# Patient Record
Sex: FEMALE | Race: BLACK OR AFRICAN AMERICAN | NOT HISPANIC OR LATINO | Employment: OTHER | URBAN - METROPOLITAN AREA
[De-identification: names, ages, dates, MRNs, and addresses within clinical notes are randomized per-mention and may not be internally consistent; named-entity substitution may affect disease eponyms.]

---

## 2017-10-06 ENCOUNTER — TRANSFERRED RECORDS (OUTPATIENT)
Dept: HEALTH INFORMATION MANAGEMENT | Facility: CLINIC | Age: 52
End: 2017-10-06

## 2017-10-25 ENCOUNTER — TRANSFERRED RECORDS (OUTPATIENT)
Dept: HEALTH INFORMATION MANAGEMENT | Facility: CLINIC | Age: 52
End: 2017-10-25

## 2017-11-14 ENCOUNTER — TRANSFERRED RECORDS (OUTPATIENT)
Dept: HEALTH INFORMATION MANAGEMENT | Facility: CLINIC | Age: 52
End: 2017-11-14

## 2018-08-09 ENCOUNTER — TRANSFERRED RECORDS (OUTPATIENT)
Dept: HEALTH INFORMATION MANAGEMENT | Facility: CLINIC | Age: 53
End: 2018-08-09

## 2018-08-15 ENCOUNTER — HEALTH MAINTENANCE LETTER (OUTPATIENT)
Age: 53
End: 2018-08-15

## 2018-11-06 ENCOUNTER — PRE VISIT (OUTPATIENT)
Dept: ORTHOPEDICS | Facility: CLINIC | Age: 53
End: 2018-11-06

## 2018-11-06 NOTE — TELEPHONE ENCOUNTER
RECORDS RECEIVED FROM: [facility]   DATE RECEIVED: In process   NOTES STATUS DETAILS   OFFICE NOTE from referring provider yes    OFFICE NOTE from other specialist yes    DISCHARGE SUMMARY from hospital none    DISCHARGE REPORT from the ER none    OPERATIVE REPORT none    MEDICATION LIST yes    IMPLANT RECORD/STICKER N/A    LABS     CBC/DIFF yes    CULTURES N/A    INJECTIONS DONE IN RADIOLOGY yes    MRI yes    CT SCAN none    XRAYS (IMAGES & REPORTS) In process    TUMOR     PATHOLOGY  Slides & report N/A

## 2018-11-12 DIAGNOSIS — M54.41 BILATERAL LOW BACK PAIN WITH RIGHT-SIDED SCIATICA: Primary | ICD-10-CM

## 2018-11-14 ENCOUNTER — RADIANT APPOINTMENT (OUTPATIENT)
Dept: GENERAL RADIOLOGY | Facility: CLINIC | Age: 53
End: 2018-11-14
Attending: PHYSICIAN ASSISTANT
Payer: COMMERCIAL

## 2018-11-14 ENCOUNTER — RADIANT APPOINTMENT (OUTPATIENT)
Dept: GENERAL RADIOLOGY | Facility: CLINIC | Age: 53
End: 2018-11-14
Attending: ORTHOPAEDIC SURGERY
Payer: COMMERCIAL

## 2018-11-14 ENCOUNTER — OFFICE VISIT (OUTPATIENT)
Dept: ORTHOPEDICS | Facility: CLINIC | Age: 53
End: 2018-11-14
Payer: COMMERCIAL

## 2018-11-14 VITALS — HEIGHT: 66 IN | WEIGHT: 160 LBS | BODY MASS INDEX: 25.71 KG/M2

## 2018-11-14 DIAGNOSIS — M53.3 SACROILIAC JOINT PAIN: ICD-10-CM

## 2018-11-14 DIAGNOSIS — M53.3 SACROILIAC JOINT PAIN: Primary | ICD-10-CM

## 2018-11-14 DIAGNOSIS — M54.41 BILATERAL LOW BACK PAIN WITH RIGHT-SIDED SCIATICA: ICD-10-CM

## 2018-11-14 ASSESSMENT — ENCOUNTER SYMPTOMS
DISTURBANCES IN COORDINATION: 1
SPEECH CHANGE: 0
SEIZURES: 0
WEAKNESS: 1
NUMBNESS: 0
TINGLING: 1
MEMORY LOSS: 1
PARALYSIS: 0
DIZZINESS: 0
TREMORS: 0
LOSS OF CONSCIOUSNESS: 0
HEADACHES: 0

## 2018-11-14 NOTE — LETTER
2018       RE: Marichuy Navarrete  [address]  [address]  [place] [address]     Dear Colleague,    Thank you for referring your patient, Marichuy Navarrete, to the [facility] at [facility]. Please see a copy of my visit note below.    [facility]  Orthopedics  Jason Matos MD  2018     Name: Marichuy Navarrete  MRN: [id number]  Age: 53 year old  : 1965  Referring provider: Yareli Carter     Chief Complaint: SI joint pain     History of Present Illness:   Marichuy Navarrete is a 53 year old female who presents today for evaluation of SI joint pain. The patient was previously evaluated at [facility]. On 07/10/2018, she reported that a prior left sided SI joint injection provided significant relief for about 5 months but her symptoms then flared. Her pain was worse with walking, standing, and crossing her legs. Her baseline leg weakness, attributed to MS, was not progressing. She had found chiropractic care and acupuncture beneficial. The patient was referred to physical therapy and to our clinic for further evaluation. Of note, the patient also had prior left sided L4-5 transforaminal epidural steroid injection with 80 percent improvement in left buttock pain.     Today, she describes a several years long history of symptoms.  It originally started in  when she noticed an episodic left foot drop as well as left sided buttock warmth.  She states that it felt like she was sitting on a heating pad.  These symptoms came and went for about 2-3 years but then started to worsen.  She notes an unsteady gate and left leg weakness.  She was diagnosed with multiple sclerosis in  by Dr. Baer.  She is currently taking ibuprofen and Tylenol.  She is using ice and heat but feels that ice is much more effective.    She denies diabetes, heart disease, history of stroke, cancer, blood clots, kidney disease and liver disease.  She is not  "taking any blood thinners.    MATHEW: 64%  Ihptzq84: 19  Pain scale: 10     Review of Systems:   A 10-point review of systems was obtained and is negative except for as noted in the HPI.     Medications:   No current outpatient prescriptions on file.    Allergies:  The patient reports no known allergies.    Past Medical History:  Multiple sclerosis    Past Surgical History:  Ulnar nerve release, 19 years ago  Hysterectomy, 17 years ago    Social History:  Patient lives with two sons in an apartment.  On social security. She denies tobacco.     Family History:  No past pertinent family history.     Physical Examination:  Height 1.676 m (5' 6\"), weight 72.6 kg (160 lb).   Constitutional - Patient is healthy, well-nourished and appears stated age.    BMI = 25.82    Respiratory - Patient is breathing normally and in no respiratory distress.   Skin - No suspicious rashes or lesions.   Psychiatric - Normal mood and affect.   Cardiovascular - Extremities warm and well perfused.   Eyes - Visual acuity is normal to the written word.   ENT - Hearing intact to the spoken word.   GI - No abdominal distention.   Musculoskeletal - Antalgic gait without use of assistive devices.            Lumbar Spine:    Appearance - Normal    Palpation - Non-tender to palpation in the midline    ROM - Deferred    Motor -        LOWER EXTREMITY Left Right   Hip flexion 4+/5 5/5   Knee flexion 5/5 5/5   Knee extension 5/5 5/5   Ankle dorsiflexion 5/5 5/5   Ankle plantarflexion 5/5 5/5   Great toe extension 4+/5 5/5     SI provocation tests:    Right Left   Mejia Finger Test  - +   EDY  - +   Thigh Thrust: - -   Pelvic Compression Test  - +   Palpation  - +   Pelvic Gapping  - +   Gaenslen s Test  - +   Sacral Thrust (SI) - +        Neurologic - Sensation intact to light touch bilaterally in the lower extremities.      REFLEXES Left Right   Patella 2+ 2+   Ankle jerk 2+ 2+       Imaging:   Radiographs of the pelvis - 3 views (11/14/2018)  They " show a dysmorphic/upsloped sacrum.    I have independently reviewed the above imaging studies; the results were discussed with the patient.       Assessment:   53 year old female with left sacroiliac joint pain.    Plan:   This patient meets the SAGE and ISASS criteria for minimally invasive SI fusion. These include 4/5 physical exam manuevers being positive, a positive response to injection, failure of extensive non-surgical management, imaging that rules out other possible pain generators (MRI of lumbar spine showing the sacrum, and plain films showing preserved joint space).  We will proceed with a second injection and I linus recommend that this be CT guided. She knows to aggravate her pain prior to and following the injection.     We discussed minimally invasive sacroiliac joint fusion in detail. It is performed using titanium triangular rods. About 70-80% of patients who have an SI fusion experience roughly 50% improvement in symptoms.  About 30% of Dr. Matos's patients have gone on to need the contralateral side fused as well.  We discussed the procedure in detail, and we reviewed the relevant anatomy using a model of the pelvis.  Weight bearing on the surgical side will be limited for three weeks after surgery, and she will need crutches during this time.  Physical therapy will be initiated after three weeks, and full recovery typically takes 6-12 weeks.      We reviewed the risks and benefits of the surgery in detail. The risks include those associated with anesthesia, including death, pulmonary embolism, DVT, stroke, myocardial infarction, pneumonia, blindness, and urinary tract infection. Additional risks include the risk of blood loss, infection, nerve damage, failure to heal, hardware problems and failure of the intervention to improve her symptoms.  With regard to blood loss, we use a medication called tranexamic acid.  Blood loss is typically around 25 cc during an SI fusion, and we have not had to  transfuse anyone during or after this particular procedure.  To mitigate the risk of infection, we use IV antibiotics.  Deep infections after this surgery are rare.  To avoid hardware problems and nerve damage, we use an intra-operative CT, which is like GPS for the spine.  She understands the risks of the surgery and wishes to proceed. The patient has a dysmorphic sacrum, so we would obtain a CT scan preoperatively to determine the exact shape of the sacrum.  After the CT scan and CT-guided injection, she should contact our office to place the surgical order.     45 minutes were spent with the patient, >50% of which was spent in discussion regarding the diagnosis, treatment options, and answering questions.    Era Chiang PA-C ........................ 11/14/2018, 11:05 AM    Scribe Disclosure:   IJing, am serving as a scribe to document services personally performed by Jason Matos MD at this visit, based upon the provider's statements to me. All documentation has been reviewed by the aforementioned provider prior to being entered into the official medical record.    I saw and evaluated the patient and developed the plan.      Answers for HPI/ROS submitted by the patient on 11/14/2018   General Symptoms: No  Skin Symptoms: No  HENT Symptoms: No  EYE SYMPTOMS: No  HEART SYMPTOMS: No  LUNG SYMPTOMS: No  INTESTINAL SYMPTOMS: No  URINARY SYMPTOMS: No  GYNECOLOGIC SYMPTOMS: No  BREAST SYMPTOMS: No  SKELETAL SYMPTOMS: No  BLOOD SYMPTOMS: No  NERVOUS SYSTEM SYMPTOMS: Yes  MENTAL HEALTH SYMPTOMS: No  Trouble with coordination: Yes  Dizziness or trouble with balance: No  Fainting or black-out spells: No  Memory loss: Yes  Headache: No  Seizures: No  Speech problems: No  Tingling: Yes  Tremor: No  Weakness: Yes  Difficulty walking: Yes  Paralysis: No  Numbness: No  PHQ-2 Score: 2      Again, thank you for allowing me to participate in the care of your patient.      Sincerely,    Jason Matos,  MD

## 2018-11-14 NOTE — NURSING NOTE
"Reason For Visit:   Chief Complaint   Patient presents with     Spine - Consult For     Consult     SI jt pain with sciatica       Primary MD: Zamzam Oconnell  Ref. MD: Yareli Roblero    ?  No  Currently working? No.  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  Date of surgery: N/A  Type of surgery: N/A.  Smoker: No      Ht 1.676 m (5' 6\")  Wt 72.6 kg (160 lb)  BMI 25.82 kg/m2    Pain Assessment  Patient Currently in Pain: Yes  0-10 Pain Scale: 10  Primary Pain Location: Sacrum  Other Pain Locations: buttock and leg   Pain Descriptors: Burning, Dull, Aching, Constant  Alleviating Factors: Ice  Aggravating Factors: Sitting, Standing, Walking, Lying, Other (comment) (any position for long periods)    Oswestry (MATHEW) Questionnaire    OSWESTRY DISABILITY INDEX 11/14/2018   Count 10   Sum 32   Oswestry Score (%) 64            Neck Disability Index (NDI) Questionnaire    No flowsheet data found.                Promis 10 Assessment    PROMIS 10 11/14/2018   In general, would you say your health is: Good   In general, would you say your quality of life is: Fair   In general, how would you rate your physical health? Good   In general, how would you rate your mental health, including your mood and your ability to think? Good   In general, how would you rate your satisfaction with your social activities and relationships? Poor   In general, please rate how well you carry out your usual social activities and roles Fair   To what extent are you able to carry out your everyday physical activities such as walking, climbing stairs, carrying groceries, or moving a chair? A little   How often have you been bothered by emotional problems such as feeling anxious, depressed or irritable? Sometimes   How would you rate your fatigue on average? Moderate   How would you rate your pain on average?   0 = No Pain  to  10 = Worst Imaginable Pain 9   In general, would you " say your health is: 3   In general, would you say your quality of life is: 2   In general, how would you rate your physical health? 3   In general, how would you rate your mental health, including your mood and your ability to think? 3   In general, how would you rate your satisfaction with your social activities and relationships? 1   In general, please rate how well you carry out your usual social activities and roles. (This includes activities at home, at work and in your community, and responsibilities as a parent, child, spouse, employee, friend, etc.) 2   To what extent are you able to carry out your everyday physical activities such as walking, climbing stairs, carrying groceries, or moving a chair? 2   In the past 7 days, how often have you been bothered by emotional problems such as feeling anxious, depressed, or irritable? 3   In the past 7 days, how would you rate your fatigue on average? 3   In the past 7 days, how would you rate your pain on average, where 0 means no pain, and 10 means worst imaginable pain? 9   Global Mental Health Score 9   Global Physical Health Score 10   PROMIS TOTAL - SUBSCORES 19                Ladi Banuelos, ATC

## 2018-11-14 NOTE — PROGRESS NOTES
[facility]  Orthopedics  Jason Matos MD  2018     Name: Marichuy Navarrete  MRN: [id number]  Age: 53 year old  : 1965  Referring provider: Yareli Carter     Chief Complaint: SI joint pain     History of Present Illness:   Marichuy Navarrete is a 53 year old female who presents today for evaluation of SI joint pain. The patient was previously evaluated at [facility] spine center. On 07/10/2018, she reported that a prior left sided SI joint injection provided significant relief for about 5 months but her symptoms then flared. Her pain was worse with walking, standing, and crossing her legs. Her baseline leg weakness, attributed to MS, was not progressing. She had found chiropractic care and acupuncture beneficial. The patient was referred to physical therapy and to our clinic for further evaluation. Of note, the patient also had prior left sided L4-5 transforaminal epidural steroid injection with 80 percent improvement in left buttock pain.     Today, she describes a several years long history of symptoms.  It originally started in  when she noticed an episodic left foot drop as well as left sided buttock warmth.  She states that it felt like she was sitting on a heating pad.  These symptoms came and went for about 2-3 years but then started to worsen.  She notes an unsteady gate and left leg weakness.  She was diagnosed with multiple sclerosis in  by Dr. Baer.  She is currently taking ibuprofen and Tylenol.  She is using ice and heat but feels that ice is much more effective.    She denies diabetes, heart disease, history of stroke, cancer, blood clots, kidney disease and liver disease.  She is not taking any blood thinners.    MATHEW: 64%  Cvuskn61: 19  Pain scale: 10     Review of Systems:   A 10-point review of systems was obtained and is negative except for as noted in the HPI.     Medications:   No current outpatient prescriptions on file.    Allergies:  The patient reports no known  "allergies.    Past Medical History:  Multiple sclerosis    Past Surgical History:  Ulnar nerve release, 19 years ago  Hysterectomy, 17 years ago    Social History:  Patient lives with two sons in an apartment.  On social security. She denies tobacco.     Family History:  No past pertinent family history.     Physical Examination:  Height 1.676 m (5' 6\"), weight 72.6 kg (160 lb).   Constitutional - Patient is healthy, well-nourished and appears stated age.    BMI = 25.82    Respiratory - Patient is breathing normally and in no respiratory distress.   Skin - No suspicious rashes or lesions.   Psychiatric - Normal mood and affect.   Cardiovascular - Extremities warm and well perfused.   Eyes - Visual acuity is normal to the written word.   ENT - Hearing intact to the spoken word.   GI - No abdominal distention.   Musculoskeletal - Antalgic gait without use of assistive devices.            Lumbar Spine:    Appearance - Normal    Palpation - Non-tender to palpation in the midline    ROM - Deferred    Motor -        LOWER EXTREMITY Left Right   Hip flexion 4+/5 5/5   Knee flexion 5/5 5/5   Knee extension 5/5 5/5   Ankle dorsiflexion 5/5 5/5   Ankle plantarflexion 5/5 5/5   Great toe extension 4+/5 5/5     SI provocation tests:    Right Left   Mejia Finger Test  - +   EDY  - +   Thigh Thrust: - -   Pelvic Compression Test  - +   Palpation  - +   Pelvic Gapping  - +   Gaenslen s Test  - +   Sacral Thrust (SI) - +        Neurologic - Sensation intact to light touch bilaterally in the lower extremities.      REFLEXES Left Right   Patella 2+ 2+   Ankle jerk 2+ 2+       Imaging:   Radiographs of the pelvis - 3 views (11/14/2018)  They show a dysmorphic/upsloped sacrum.    I have independently reviewed the above imaging studies; the results were discussed with the patient.       Assessment:   53 year old female with left sacroiliac joint pain.    Plan:   This patient meets the SAGE and ISASS criteria for minimally invasive SI " fusion. These include 4/5 physical exam manuevers being positive, a positive response to injection, failure of extensive non-surgical management, imaging that rules out other possible pain generators (MRI of lumbar spine showing the sacrum, and plain films showing preserved joint space).  We will proceed with a second injection and YANI barriga recommend that this be CT guided. She knows to aggravate her pain prior to and following the injection.     We discussed minimally invasive sacroiliac joint fusion in detail. It is performed using titanium triangular rods. About 70-80% of patients who have an SI fusion experience roughly 50% improvement in symptoms.  About 30% of Dr. Matos's patients have gone on to need the contralateral side fused as well.  We discussed the procedure in detail, and we reviewed the relevant anatomy using a model of the pelvis.  Weight bearing on the surgical side will be limited for three weeks after surgery, and she will need crutches during this time.  Physical therapy will be initiated after three weeks, and full recovery typically takes 6-12 weeks.      We reviewed the risks and benefits of the surgery in detail. The risks include those associated with anesthesia, including death, pulmonary embolism, DVT, stroke, myocardial infarction, pneumonia, blindness, and urinary tract infection. Additional risks include the risk of blood loss, infection, nerve damage, failure to heal, hardware problems and failure of the intervention to improve her symptoms.  With regard to blood loss, we use a medication called tranexamic acid.  Blood loss is typically around 25 cc during an SI fusion, and we have not had to transfuse anyone during or after this particular procedure.  To mitigate the risk of infection, we use IV antibiotics.  Deep infections after this surgery are rare.  To avoid hardware problems and nerve damage, we use an intra-operative CT, which is like GPS for the spine.  She understands the  risks of the surgery and wishes to proceed. The patient has a dysmorphic sacrum, so we would obtain a CT scan preoperatively to determine the exact shape of the sacrum.  After the CT scan and CT-guided injection, she should contact our office to place the surgical order.     45 minutes were spent with the patient, >50% of which was spent in discussion regarding the diagnosis, treatment options, and answering questions.    Era Chiang PA-C ........................ 11/14/2018, 11:05 AM    Scribe Disclosure:   I, Jing Gil, am serving as a scribe to document services personally performed by Jason Matos MD at this visit, based upon the provider's statements to me. All documentation has been reviewed by the aforementioned provider prior to being entered into the official medical record.    I saw and evaluated the patient and developed the plan.      Answers for HPI/ROS submitted by the patient on 11/14/2018   General Symptoms: No  Skin Symptoms: No  HENT Symptoms: No  EYE SYMPTOMS: No  HEART SYMPTOMS: No  LUNG SYMPTOMS: No  INTESTINAL SYMPTOMS: No  URINARY SYMPTOMS: No  GYNECOLOGIC SYMPTOMS: No  BREAST SYMPTOMS: No  SKELETAL SYMPTOMS: No  BLOOD SYMPTOMS: No  NERVOUS SYSTEM SYMPTOMS: Yes  MENTAL HEALTH SYMPTOMS: No  Trouble with coordination: Yes  Dizziness or trouble with balance: No  Fainting or black-out spells: No  Memory loss: Yes  Headache: No  Seizures: No  Speech problems: No  Tingling: Yes  Tremor: No  Weakness: Yes  Difficulty walking: Yes  Paralysis: No  Numbness: No  PHQ-2 Score: 2

## 2018-11-14 NOTE — MR AVS SNAPSHOT
"              After Visit Summary   2018    Marichuy Navarrete    MRN: [id number]           Patient Information     Date Of Birth          1965        Visit Information        Provider Department      2018 10:00 AM [facility]        Today's Diagnoses     Sacroiliac joint pain    -  1       Follow-ups after your visit        Future tests that were ordered for you today     Open Future Orders        Priority Expected Expires Ordered    CT Pelvis Bone wo Contrast Routine  2019    CT Sacroiliac Diagnostic Joint Injection Routine  2019            Who to contact     Please call your clinic at [phone] to:    Ask questions about your health    Make or cancel appointments    Discuss your medicines    Learn about your test results    Speak to your doctor            Additional Information About Your Visit        MyChart Information     [employer] is an electronic gateway that provides easy, online access to your medical records. With [employer], you can request a clinic appointment, read your test results, renew a prescription or communicate with your care team.     To sign up for [employer] visit the website at [email or web address]   You will be asked to enter the access code listed below, as well as some personal information. Please follow the directions to create your username and password.     Your access code is: [email or web address]  Expires: 2019  5:31 AM     Your access code will  in 90 days. If you need help or a new code, please contact your [facility] or call [phone] for assistance.        Care EveryWhere ID     This is your Care EveryWhere ID. This could be used by other organizations to access your [facility] medical records  [id number]        Your Vitals Were     Height BMI (Body Mass Index)                1.676 m (5' 6\") 25.82 kg/m2           Blood Pressure from Last 3 Encounters:   No " data found for BP    Weight from Last 3 Encounters:   11/14/18 72.6 kg (160 lb)               Primary Care Provider Office Phone # Fax #    Zmazam Oconnell [phone]       [facility] [address]  [facility] [address]        Equal Access to Services     JAYCEELEE KENZIE : Hadii aad ku hadasho Soomaali, waaxda luqadaha, qaybta kaalmada adeegyada, waxay idiin hayaan adeeg khararamiro la'aan ah. So [facility] [phone].    ATENCIÓN: Si habla español, tiene a terry disposición servicios gratuitos de asistencia lingüística. Llame al [phone].    We comply with applicable federal civil rights laws and [place] laws. We do not discriminate on the basis of race, color, national origin, age, disability, sex, sexual orientation, or gender identity.            Thank you!     Thank you for choosing [facility]  for your care. Our goal is always to provide you with excellent care. Hearing back from our patients is one way we can continue to improve our services. Please take a few minutes to complete the written survey that you may receive in the mail after your visit with us. Thank you!             Your Updated Medication List - Protect others around you: Learn how to safely use, store and throw away your medicines at www.disposemymeds.org.      Notice  As of 11/14/2018 12:17 PM    You have not been prescribed any medications.

## 2018-11-21 ENCOUNTER — TRANSFERRED RECORDS (OUTPATIENT)
Dept: HEALTH INFORMATION MANAGEMENT | Facility: CLINIC | Age: 53
End: 2018-11-21

## 2018-11-21 ENCOUNTER — TELEPHONE (OUTPATIENT)
Dept: ORTHOPEDICS | Facility: CLINIC | Age: 53
End: 2018-11-21

## 2018-11-21 NOTE — TELEPHONE ENCOUNTER
[facility] Call Center    Phone Message    May a detailed message be left on voicemail: no    Reason for Call: Other: Patient is calling to report that she had a SI joint injection at [facility] 11/21/2018. Patient is reporting a pain level of a 3 currenty and before the procedure her pain level was a 10. Patient would like to let Dr. Matos know.      Action Taken: Message routed to:  [facility]      See phone messages.  Pt reports relief of Left back/buttock pain from rated 10 before to rated 3 after CT guided Left SacroIliac Joint injection-see report which also states pain relief.  With this injection she meets the SAGE & ISASS criteria for Minimally Invasive Left SacroIliac Joint Fusion surgery.  See dictation of clinic visit 11-14-18 for documentation of physical exam manuevers.   reviewed CT scan which he states documents  Her Dysmorphic Sacrum so her Anatomy is documented.  He also reviewed injection result & placed surgery order for above.    I left pt. Message of above &  that Prior Auth. Dept  [phone]  will work on Insurance approval next.    Call back prn with questions. SANTINO./Marge Covington RN.

## 2018-11-28 ENCOUNTER — TELEPHONE (OUTPATIENT)
Dept: ORTHOPEDICS | Facility: CLINIC | Age: 53
End: 2018-11-28

## 2018-11-28 NOTE — TELEPHONE ENCOUNTER
[facility] Call Center    Phone Message    May a detailed message be left on voicemail: yes    Reason for Call: Other: Patient called to schedule surgery as she is in a great deal of pain.       Action Taken: Message routed to:  [facility]o    See 2 phone messages.  Pt reports relief of Left back/buttock pain from rated 10 before to rated 3 after CT guided Left SacroIliac Joint injection-see report which also states pain relief.  With this injection she meets the SAGE & ISASS criteria for Minimally Invasive Left SacroIliac Joint Fusion surgery discussed at appt.   See dictation of clinic visit 11-14-18 for documentation of physical exam manuevers.   reviewed CT scan which he states documents  Her Dysmorphic Sacrum so her Anatomy is documented.  He also reviewed injection result & placed surgery order for above.    I left pt. Message of above &  that Prior Auth. Dept  [phone]  will work on Insurance approval next.    Call back prn with questions. SANTINO./Marge Covington RN.

## 2018-11-30 ENCOUNTER — RADIANT APPOINTMENT (OUTPATIENT)
Dept: CT IMAGING | Facility: CLINIC | Age: 53
End: 2018-11-30
Attending: PHYSICIAN ASSISTANT
Payer: COMMERCIAL

## 2018-11-30 DIAGNOSIS — M53.3 SACROILIAC JOINT PAIN: ICD-10-CM

## 2019-01-08 LAB — MAMMOGRAM: NORMAL

## 2019-02-25 ENCOUNTER — TELEPHONE (OUTPATIENT)
Dept: ORTHOPEDICS | Facility: CLINIC | Age: 54
End: 2019-02-25

## 2019-02-25 NOTE — TELEPHONE ENCOUNTER
Patient is scheduled for surgery with Dr. Matos    Spoke or left message with: Patient    Date of Surgery: 3/18/19    Location: [place]    Post op: 6 weeks    Pre-op with surgeon (if applicable): Complete    H&P: Patient will call to schedule with PCP    Additional imaging/appointments: N/A    Surgery packet: Received in clinic     Additional comments: SI bone notified

## 2019-03-11 DIAGNOSIS — M46.1 SACROILIITIS (H): Primary | ICD-10-CM

## 2019-03-12 ENCOUNTER — DOCUMENTATION ONLY (OUTPATIENT)
Dept: CARE COORDINATION | Facility: CLINIC | Age: 54
End: 2019-03-12

## 2019-03-12 ENCOUNTER — TELEPHONE (OUTPATIENT)
Dept: ORTHOPEDICS | Facility: CLINIC | Age: 54
End: 2019-03-12

## 2019-03-12 ENCOUNTER — THERAPY VISIT (OUTPATIENT)
Dept: PHYSICAL THERAPY | Facility: CLINIC | Age: 54
End: 2019-03-12
Payer: COMMERCIAL

## 2019-03-12 DIAGNOSIS — M53.3 SI (SACROILIAC) JOINT DYSFUNCTION: ICD-10-CM

## 2019-03-12 DIAGNOSIS — M46.1 SACROILIITIS (H): ICD-10-CM

## 2019-03-12 PROCEDURE — 97161 PT EVAL LOW COMPLEX 20 MIN: CPT | Mod: GP | Performed by: PHYSICAL THERAPIST

## 2019-03-12 PROCEDURE — 97530 THERAPEUTIC ACTIVITIES: CPT | Mod: GP | Performed by: PHYSICAL THERAPIST

## 2019-03-12 PROCEDURE — 97110 THERAPEUTIC EXERCISES: CPT | Mod: GP | Performed by: PHYSICAL THERAPIST

## 2019-03-12 NOTE — TELEPHONE ENCOUNTER
[facility] Call Center    Phone Message    May a detailed message be left on voicemail: yes    Reason for Call: Other: pt calling to say that if you want the preop form that you need to call Dr Zamzam Oconnell's office to request it at [phone]. Per the pt     Action Taken: Message routed to:  [facility] Orthopedics

## 2019-03-12 NOTE — PROGRESS NOTES
Physical Therapy Initial Evaluation  March 12, 2019     MD Instructions/Precautions/Restrictions: Pre-op visit (1 time) - instruct in post-op protocol.     Therapist Impression:   Marichuy Navarrete presents with findings consistent with SI dysfunction, with related impairments limiting her ability to sit, sleep, stand, walk, navigate stairs, bend, lift. Skilled PT services are necessary in order to reduce impairments and improve independent function.     Subjective:   Date of Surgery: 3/18/19  C/C: LBP/L SI pain down entire L leg. Hx of L foot drop many years ago, also diagnosed with MS in 2014.   Worsened by: sit, sleep, stand, walk, navigate stairs, bend, lift.    Alleviated by: L SIJ injection with 5 months relief, also of note she has in the past had L4-5 transforaminal JOEL with 80% improvement in L buttock pain.    General health as reported by patient: fair  Pertinent medical/surgical history: Refer to health history in EMR. Imaging: refer to EMR. Current occupational status: SSI. Patient's goals are: decrease pain, ambulate with greater ease.    Objective:  Area(s) of Chief Complaint: low back left, SIJ left, glute left and leg left.     Lumbar Screen:  Active ROM Limitation   Flexion Mod loss, mild incr LBP   Extension Mod/max loss, incr LBP    L R   Rotation Min, NE Min, NE   Sidebend Min, NE Min, NE     Effect of Repeated Lumbar Movements: symptoms same.   Lumbopelvic rhythm: Decreased lower lumbar mobility, hypermobile contribution from upper lumbar/TLJ.   Beaulieu/quadrant test: (-) bilaterally.     Deferred Gladis series of SIJ provocation tests due to recently having been tested by MD.    Hip Screen:  (*Indicates patient s pain)   PROM L PROM R   Hip Flx 120 120   Hip IR 90/90 45 45   Hip ER 90/90 50 50     Effect of Low Trunk Manipulation: not assessed.     PT Goals:  1.) By end of visit, patient will be able to maintain WB precaution of 20lbs. using walker and/or crutches in order to promote independent  home and community mobility.     2.) By end of visit, patient will be independent in use of home exercise program for gradual improvements in painfree functional mobility and improving strength in order to promote independent living and general health/wellness.     Assessment/Plan:    The patient is a 54 year old female with chief complaint of LBP/buttock pain.    The patient has the following significant findings with corresponding treatment plan.  Diagnosis 1:  SI dysfunction    Pain -  hot/cold therapy, manual therapy, splint/taping/bracing/orthotics and self management  Decreased ROM/flexibility - manual therapy, therapeutic exercise and home program  Decreased joint mobility - manual therapy, therapeutic exercise and home program  Decreased strength - therapeutic exercise, therapeutic activities and home program  Impaired balance - neuro re-education, therapeutic activities and home program  Decreased proprioception - neuro re-education and therapeutic activities  Impaired gait - gait training and assistive devices  Impaired muscle performance - neuro re-education and home program  Decreased function - therapeutic activities and home program  Impaired posture - neuro re-education, therapeutic activities and home program  Instability -  Therapeutic Activity, Therapeutic Exercise, Neuromuscular Re-education, Splinting/Taping/Bracing/Orthotic, home program    Therapy Evaluation Codes:   1) History comprised of:   Personal factors that impact the plan of care:      Please refer to health history in EMR.    Comorbidity factors that impact the plan of care are:      Please refer to health history in EMR.     Medications impacting care: None.  2) Examination of Body Systems comprised of:   Body structures and functions that impact the plan of care:      Sacral illiac joint.   Activity limitations that impact the plan of care are:      Bathing, Bending, Cooking, Dressing, Lifting, Sitting, Squatting/kneeling, Stairs,  Standing, Walking, Sleeping and Laying down.   Clinical presentation characteristics are:    Stable/Uncomplicated.  3) Presentation comprised of:   Presentation scored as Low complexity with uncomplicated characteristics..  4) Decision-Making    Low complexity using standardized patient assessment instrument and/or measureable assessment of functional outcome.  Cumulative Therapy Evaluation is: Low complexity.    Previous and current functional limitations:  (See Goal Flow Sheet for this information)    Short term and Long term goals: (See Goal Flow Sheet for this information)     Communication ability:  Patient appears to be able to clearly communicate and understand verbal and written communication and follow directions correctly.  Treatment Explanation - The following has been discussed with the patient: RX ordered/plan of care, anticipated outcomes, and possible risks and side effects.  This patient would benefit from PT intervention to resume normal activities.   Rehab potential is good.    Frequency:  1 X week, once daily  Duration:  for 1 weeks (pre-op only, will re-evaluate patient post-operatively)  Discharge Plan: Achieve all LTGs, be independent in home treatment program, and reach maximal therapeutic benefit.    Please refer to the daily flowsheet for treatment today, total treatment time and time spent performing 1:1 timed codes.

## 2019-03-12 NOTE — TELEPHONE ENCOUNTER
Returned call to receive preop form completed 3/5/19 at [facility] per pt. Requested preop form to be sent to fax [phone] with attention to Marge JUDD RN. Form was to be sent out today.     Jason GATES ATC

## 2019-03-13 NOTE — PROGRESS NOTES
[facility] Initial Evaluation  Subjective:  The history is provided by the patient. No  was used.   Marichuy Navarrete is a 54 year old female with a sacroiliac condition.                                   Pertinent medical history includes:  High blood pressure and multiple sclerosis.  Medical allergies: no.  Other surgeries include:  Other (Hysterectomy; ulnar nerve surgery left elbow).  Current medications:  Muscle relaxants.                                      Objective:  System    Physical Exam    General     ROS    Assessment/Plan:

## 2019-03-14 RX ORDER — HYDROCODONE BITARTRATE AND ACETAMINOPHEN 5; 325 MG/1; MG/1
1 TABLET ORAL EVERY 4 HOURS PRN
Status: ON HOLD | COMMUNITY
End: 2019-03-18

## 2019-03-14 RX ORDER — ACETAMINOPHEN 500 MG
500-1000 TABLET ORAL EVERY 6 HOURS PRN
Status: ON HOLD | COMMUNITY
End: 2019-03-18

## 2019-03-18 ENCOUNTER — ANESTHESIA EVENT (OUTPATIENT)
Dept: SURGERY | Facility: CLINIC | Age: 54
End: 2019-03-18
Payer: COMMERCIAL

## 2019-03-18 ENCOUNTER — APPOINTMENT (OUTPATIENT)
Dept: GENERAL RADIOLOGY | Facility: CLINIC | Age: 54
End: 2019-03-18
Attending: ORTHOPAEDIC SURGERY
Payer: COMMERCIAL

## 2019-03-18 ENCOUNTER — ANESTHESIA (OUTPATIENT)
Dept: SURGERY | Facility: CLINIC | Age: 54
End: 2019-03-18
Payer: COMMERCIAL

## 2019-03-18 ENCOUNTER — HOSPITAL ENCOUNTER (OUTPATIENT)
Facility: CLINIC | Age: 54
Discharge: HOME | End: 2019-03-18
Attending: ORTHOPAEDIC SURGERY | Admitting: ORTHOPAEDIC SURGERY
Payer: COMMERCIAL

## 2019-03-18 VITALS
HEART RATE: 72 BPM | SYSTOLIC BLOOD PRESSURE: 123 MMHG | TEMPERATURE: 97.5 F | DIASTOLIC BLOOD PRESSURE: 83 MMHG | OXYGEN SATURATION: 99 % | BODY MASS INDEX: 26.58 KG/M2 | RESPIRATION RATE: 14 BRPM | WEIGHT: 164.68 LBS

## 2019-03-18 DIAGNOSIS — M53.3 SI (SACROILIAC) JOINT DYSFUNCTION: Primary | ICD-10-CM

## 2019-03-18 LAB — GLUCOSE BLDC GLUCOMTR-MCNC: 123 MG/DL (ref 70–99)

## 2019-03-18 PROCEDURE — 25000132 ZZH RX MED GY IP 250 OP 250 PS 637: Performed by: ANESTHESIOLOGY

## 2019-03-18 PROCEDURE — 25000566 ZZH SEVOFLURANE, EA 15 MIN: Performed by: ORTHOPAEDIC SURGERY

## 2019-03-18 PROCEDURE — 25000128 H RX IP 250 OP 636: Performed by: NURSE ANESTHETIST, CERTIFIED REGISTERED

## 2019-03-18 PROCEDURE — 71000015 ZZH RECOVERY PHASE 1 LEVEL 2 EA ADDTL HR: Performed by: ORTHOPAEDIC SURGERY

## 2019-03-18 PROCEDURE — 25800030 ZZH RX IP 258 OP 636: Performed by: NURSE ANESTHETIST, CERTIFIED REGISTERED

## 2019-03-18 PROCEDURE — 25000301 ZZH OR RX SURGIFLO W/THROMBIN KIT 2ML 1991 OPNP: Performed by: ORTHOPAEDIC SURGERY

## 2019-03-18 PROCEDURE — 25000128 H RX IP 250 OP 636: Performed by: PHYSICIAN ASSISTANT

## 2019-03-18 PROCEDURE — 37000009 ZZH ANESTHESIA TECHNICAL FEE, EACH ADDTL 15 MIN: Performed by: ORTHOPAEDIC SURGERY

## 2019-03-18 PROCEDURE — 27210794 ZZH OR GENERAL SUPPLY STERILE: Performed by: ORTHOPAEDIC SURGERY

## 2019-03-18 PROCEDURE — 71000027 ZZH RECOVERY PHASE 2 EACH 15 MINS: Performed by: ORTHOPAEDIC SURGERY

## 2019-03-18 PROCEDURE — 71000014 ZZH RECOVERY PHASE 1 LEVEL 2 FIRST HR: Performed by: ORTHOPAEDIC SURGERY

## 2019-03-18 PROCEDURE — 37000008 ZZH ANESTHESIA TECHNICAL FEE, 1ST 30 MIN: Performed by: ORTHOPAEDIC SURGERY

## 2019-03-18 PROCEDURE — 40000278 XR SURGERY CARM FLUORO LESS THAN 5 MIN: Mod: TC

## 2019-03-18 PROCEDURE — 82962 GLUCOSE BLOOD TEST: CPT

## 2019-03-18 PROCEDURE — C9290 INJ, BUPIVACAINE LIPOSOME: HCPCS | Performed by: PHYSICIAN ASSISTANT

## 2019-03-18 PROCEDURE — 25000128 H RX IP 250 OP 636: Performed by: ORTHOPAEDIC SURGERY

## 2019-03-18 PROCEDURE — 36000078 ZZH SURGERY LEVEL 6 W FLUORO 1ST 30 MIN - UMMC: Performed by: ORTHOPAEDIC SURGERY

## 2019-03-18 PROCEDURE — C1776 JOINT DEVICE (IMPLANTABLE): HCPCS | Performed by: ORTHOPAEDIC SURGERY

## 2019-03-18 PROCEDURE — 25000125 ZZHC RX 250: Performed by: NURSE ANESTHETIST, CERTIFIED REGISTERED

## 2019-03-18 PROCEDURE — 25000128 H RX IP 250 OP 636: Performed by: ANESTHESIOLOGY

## 2019-03-18 PROCEDURE — 25000125 ZZHC RX 250: Performed by: ORTHOPAEDIC SURGERY

## 2019-03-18 PROCEDURE — 36000076 ZZH SURGERY LEVEL 6 EA 15 ADDTL MIN - UMMC: Performed by: ORTHOPAEDIC SURGERY

## 2019-03-18 PROCEDURE — 40000171 ZZH STATISTIC PRE-PROCEDURE ASSESSMENT III: Performed by: ORTHOPAEDIC SURGERY

## 2019-03-18 DEVICE — IMPLANTABLE DEVICE: Type: IMPLANTABLE DEVICE | Site: SACRUM | Status: FUNCTIONAL

## 2019-03-18 RX ORDER — ACETAMINOPHEN 325 MG/1
975 TABLET ORAL ONCE
Status: COMPLETED | OUTPATIENT
Start: 2019-03-18 | End: 2019-03-18

## 2019-03-18 RX ORDER — SODIUM CHLORIDE, SODIUM LACTATE, POTASSIUM CHLORIDE, CALCIUM CHLORIDE 600; 310; 30; 20 MG/100ML; MG/100ML; MG/100ML; MG/100ML
INJECTION, SOLUTION INTRAVENOUS CONTINUOUS PRN
Status: DISCONTINUED | OUTPATIENT
Start: 2019-03-18 | End: 2019-03-18

## 2019-03-18 RX ORDER — SODIUM CHLORIDE, SODIUM LACTATE, POTASSIUM CHLORIDE, CALCIUM CHLORIDE 600; 310; 30; 20 MG/100ML; MG/100ML; MG/100ML; MG/100ML
INJECTION, SOLUTION INTRAVENOUS CONTINUOUS
Status: DISCONTINUED | OUTPATIENT
Start: 2019-03-18 | End: 2019-03-18 | Stop reason: HOSPADM

## 2019-03-18 RX ORDER — MEPERIDINE HYDROCHLORIDE 25 MG/ML
12.5 INJECTION INTRAMUSCULAR; INTRAVENOUS; SUBCUTANEOUS
Status: DISCONTINUED | OUTPATIENT
Start: 2019-03-18 | End: 2019-03-18 | Stop reason: HOSPADM

## 2019-03-18 RX ORDER — EPHEDRINE SULFATE 50 MG/ML
INJECTION, SOLUTION INTRAMUSCULAR; INTRAVENOUS; SUBCUTANEOUS PRN
Status: DISCONTINUED | OUTPATIENT
Start: 2019-03-18 | End: 2019-03-18

## 2019-03-18 RX ORDER — LIDOCAINE 40 MG/G
CREAM TOPICAL
Status: DISCONTINUED | OUTPATIENT
Start: 2019-03-18 | End: 2019-03-18 | Stop reason: HOSPADM

## 2019-03-18 RX ORDER — NALOXONE HYDROCHLORIDE 0.4 MG/ML
.1-.4 INJECTION, SOLUTION INTRAMUSCULAR; INTRAVENOUS; SUBCUTANEOUS
Status: DISCONTINUED | OUTPATIENT
Start: 2019-03-18 | End: 2019-03-18 | Stop reason: HOSPADM

## 2019-03-18 RX ORDER — ONDANSETRON 4 MG/1
4 TABLET, ORALLY DISINTEGRATING ORAL EVERY 30 MIN PRN
Status: DISCONTINUED | OUTPATIENT
Start: 2019-03-18 | End: 2019-03-18 | Stop reason: HOSPADM

## 2019-03-18 RX ORDER — CEFAZOLIN SODIUM 1 G/3ML
1 INJECTION, POWDER, FOR SOLUTION INTRAMUSCULAR; INTRAVENOUS SEE ADMIN INSTRUCTIONS
Status: DISCONTINUED | OUTPATIENT
Start: 2019-03-18 | End: 2019-03-18 | Stop reason: HOSPADM

## 2019-03-18 RX ORDER — ACETAMINOPHEN 325 MG/1
325-650 TABLET ORAL EVERY 6 HOURS PRN
Qty: 100 TABLET | Refills: 0 | Status: SHIPPED | OUTPATIENT
Start: 2019-03-18

## 2019-03-18 RX ORDER — FENTANYL CITRATE 50 UG/ML
INJECTION, SOLUTION INTRAMUSCULAR; INTRAVENOUS PRN
Status: DISCONTINUED | OUTPATIENT
Start: 2019-03-18 | End: 2019-03-18

## 2019-03-18 RX ORDER — OXYCODONE HYDROCHLORIDE 5 MG/1
5-10 TABLET ORAL EVERY 4 HOURS PRN
Qty: 30 TABLET | Refills: 0 | Status: SHIPPED | OUTPATIENT
Start: 2019-03-18 | End: 2019-03-25

## 2019-03-18 RX ORDER — BUPIVACAINE HYDROCHLORIDE AND EPINEPHRINE 2.5; 5 MG/ML; UG/ML
INJECTION, SOLUTION INFILTRATION; PERINEURAL PRN
Status: DISCONTINUED | OUTPATIENT
Start: 2019-03-18 | End: 2019-03-18 | Stop reason: HOSPADM

## 2019-03-18 RX ORDER — CEFAZOLIN SODIUM 2 G/100ML
2 INJECTION, SOLUTION INTRAVENOUS
Status: COMPLETED | OUTPATIENT
Start: 2019-03-18 | End: 2019-03-18

## 2019-03-18 RX ORDER — GABAPENTIN 300 MG/1
300 CAPSULE ORAL ONCE
Status: COMPLETED | OUTPATIENT
Start: 2019-03-18 | End: 2019-03-18

## 2019-03-18 RX ORDER — LIDOCAINE HYDROCHLORIDE 20 MG/ML
INJECTION, SOLUTION INFILTRATION; PERINEURAL PRN
Status: DISCONTINUED | OUTPATIENT
Start: 2019-03-18 | End: 2019-03-18

## 2019-03-18 RX ORDER — ONDANSETRON 2 MG/ML
4 INJECTION INTRAMUSCULAR; INTRAVENOUS EVERY 30 MIN PRN
Status: DISCONTINUED | OUTPATIENT
Start: 2019-03-18 | End: 2019-03-18 | Stop reason: HOSPADM

## 2019-03-18 RX ORDER — PROPOFOL 10 MG/ML
INJECTION, EMULSION INTRAVENOUS CONTINUOUS PRN
Status: DISCONTINUED | OUTPATIENT
Start: 2019-03-18 | End: 2019-03-18

## 2019-03-18 RX ORDER — SENNA AND DOCUSATE SODIUM 50; 8.6 MG/1; MG/1
1 TABLET, FILM COATED ORAL 2 TIMES DAILY PRN
Qty: 30 TABLET | Refills: 0 | Status: SHIPPED | OUTPATIENT
Start: 2019-03-18

## 2019-03-18 RX ORDER — HYDROMORPHONE HYDROCHLORIDE 1 MG/ML
.3-.5 INJECTION, SOLUTION INTRAMUSCULAR; INTRAVENOUS; SUBCUTANEOUS EVERY 10 MIN PRN
Status: DISCONTINUED | OUTPATIENT
Start: 2019-03-18 | End: 2019-03-18 | Stop reason: HOSPADM

## 2019-03-18 RX ORDER — FENTANYL CITRATE 50 UG/ML
25-50 INJECTION, SOLUTION INTRAMUSCULAR; INTRAVENOUS
Status: DISCONTINUED | OUTPATIENT
Start: 2019-03-18 | End: 2019-03-18 | Stop reason: HOSPADM

## 2019-03-18 RX ORDER — PROPOFOL 10 MG/ML
INJECTION, EMULSION INTRAVENOUS PRN
Status: DISCONTINUED | OUTPATIENT
Start: 2019-03-18 | End: 2019-03-18

## 2019-03-18 RX ORDER — OXYCODONE HYDROCHLORIDE 5 MG/1
5-10 TABLET ORAL EVERY 4 HOURS PRN
Status: DISCONTINUED | OUTPATIENT
Start: 2019-03-18 | End: 2019-03-18 | Stop reason: HOSPADM

## 2019-03-18 RX ORDER — ONDANSETRON 2 MG/ML
INJECTION INTRAMUSCULAR; INTRAVENOUS PRN
Status: DISCONTINUED | OUTPATIENT
Start: 2019-03-18 | End: 2019-03-18

## 2019-03-18 RX ADMIN — PHENYLEPHRINE HYDROCHLORIDE 0.2 MCG/KG/MIN: 10 INJECTION, SOLUTION INTRAMUSCULAR; INTRAVENOUS; SUBCUTANEOUS at 08:18

## 2019-03-18 RX ADMIN — PROPOFOL 25 MCG/KG/MIN: 10 INJECTION, EMULSION INTRAVENOUS at 08:15

## 2019-03-18 RX ADMIN — OXYCODONE HYDROCHLORIDE 5 MG: 5 TABLET ORAL at 10:27

## 2019-03-18 RX ADMIN — PROPOFOL 140 MG: 10 INJECTION, EMULSION INTRAVENOUS at 07:48

## 2019-03-18 RX ADMIN — SUGAMMADEX 200 MG: 100 INJECTION, SOLUTION INTRAVENOUS at 09:08

## 2019-03-18 RX ADMIN — ROCURONIUM BROMIDE 40 MG: 10 INJECTION INTRAVENOUS at 07:49

## 2019-03-18 RX ADMIN — CEFAZOLIN SODIUM 2 G: 2 INJECTION, SOLUTION INTRAVENOUS at 08:00

## 2019-03-18 RX ADMIN — PROPOFOL 60 MG: 10 INJECTION, EMULSION INTRAVENOUS at 07:49

## 2019-03-18 RX ADMIN — PHENYLEPHRINE HYDROCHLORIDE 50 MCG: 10 INJECTION, SOLUTION INTRAMUSCULAR; INTRAVENOUS; SUBCUTANEOUS at 08:06

## 2019-03-18 RX ADMIN — LIDOCAINE HYDROCHLORIDE 100 MG: 20 INJECTION, SOLUTION INFILTRATION; PERINEURAL at 07:48

## 2019-03-18 RX ADMIN — FENTANYL CITRATE 50 MCG: 50 INJECTION, SOLUTION INTRAMUSCULAR; INTRAVENOUS at 08:26

## 2019-03-18 RX ADMIN — OXYCODONE HYDROCHLORIDE 5 MG: 5 TABLET ORAL at 11:21

## 2019-03-18 RX ADMIN — Medication 5 MG: at 08:12

## 2019-03-18 RX ADMIN — ACETAMINOPHEN 975 MG: 325 TABLET, FILM COATED ORAL at 07:18

## 2019-03-18 RX ADMIN — ONDANSETRON 4 MG: 2 INJECTION INTRAMUSCULAR; INTRAVENOUS at 09:08

## 2019-03-18 RX ADMIN — PHENYLEPHRINE HYDROCHLORIDE 50 MCG: 10 INJECTION, SOLUTION INTRAMUSCULAR; INTRAVENOUS; SUBCUTANEOUS at 08:12

## 2019-03-18 RX ADMIN — FENTANYL CITRATE 75 MCG: 50 INJECTION, SOLUTION INTRAMUSCULAR; INTRAVENOUS at 07:48

## 2019-03-18 RX ADMIN — PHENYLEPHRINE HYDROCHLORIDE 50 MCG: 10 INJECTION, SOLUTION INTRAMUSCULAR; INTRAVENOUS; SUBCUTANEOUS at 08:45

## 2019-03-18 RX ADMIN — SODIUM CHLORIDE, POTASSIUM CHLORIDE, SODIUM LACTATE AND CALCIUM CHLORIDE: 600; 310; 30; 20 INJECTION, SOLUTION INTRAVENOUS at 07:40

## 2019-03-18 RX ADMIN — MIDAZOLAM 2 MG: 1 INJECTION INTRAMUSCULAR; INTRAVENOUS at 07:40

## 2019-03-18 RX ADMIN — GABAPENTIN 300 MG: 300 CAPSULE ORAL at 07:18

## 2019-03-18 RX ADMIN — Medication 0.3 MG: at 09:52

## 2019-03-18 NOTE — OR NURSING
Attempted to get up again pt stated felt better ,But became adiaphoric and dizzy again up in recliner  Now, family at bedside continue to monitor  VSS.  Will allow to rest again and attempt later to get up again

## 2019-03-18 NOTE — ANESTHESIA PREPROCEDURE EVALUATION
Anesthesia Pre-Procedure Evaluation    Patient: Marichuy Navarrete   MRN:     [id number] Gender:   female   Age:    54 year old :      1965        Preoperative Diagnosis: Left Sacroiliac Joint Pain   Procedure(s):  STEALTH ASSISTED MINIMALLY INVASIVE SACRAL ILIAC JOINT FUSION LEFT     Past Medical History:   Diagnosis Date     Abnormal gait      Cervical radiculopathy      Hypertension      Lumbar radiculopathy      Multiple sclerosis (H)     LLE weakness     Osteopenia      Other chronic pain     SI joint pain, left sciatica     Pure hypercholesterolemia      Renal disease       History reviewed. No pertinent surgical history.       Anesthesia Evaluation     .             ROS/MED HX    ENT/Pulmonary:       Neurologic:     (+)Multiple Sclerosis     Cardiovascular:     (+) Dyslipidemia, hypertension----. : . . . :. .       METS/Exercise Tolerance:     Hematologic:         Musculoskeletal:         GI/Hepatic:         Renal/Genitourinary:         Endo:         Psychiatric:         Infectious Disease:         Malignancy:         Other:    (+) H/O Chronic Pain,                       PHYSICAL EXAM:   Mental Status/Neuro: A/A/O   Airway: Facies: Feasible  Mallampati: II  Mouth/Opening: Full  TM distance: > 6 cm  Neck ROM: Full   Respiratory: Auscultation: CTAB     Resp. Rate: Normal     Resp. Effort: Normal      CV: Rhythm: Regular  Rate: Age appropriate  Heart: Normal Sounds   Comments:      Dental: Normal                  No results found for: WBC, HGB, HCT, PLT, CRP, SED, NA, POTASSIUM, CHLORIDE, CO2, BUN, CR, GLC, VENTURA, PHOS, MAG, ALBUMIN, PROTTOTAL, ALT, AST, GGT, ALKPHOS, BILITOTAL, BILIDIRECT, LIPASE, AMYLASE, BLAZE, PTT, INR, FIBR, TSH, T4, T3, HCG, HCGS, CKTOTAL, CKMB, TROPN    Preop Vitals  BP Readings from Last 3 Encounters:   19 (!) 163/99    Pulse Readings from Last 3 Encounters:   No data found for Pulse      Resp Readings from Last 3 Encounters:   19 20    SpO2 Readings from Last 3  "Encounters:   03/18/19 100%      Temp Readings from Last 1 Encounters:   03/18/19 36.4  C (97.5  F) (Oral)    Ht Readings from Last 1 Encounters:   11/14/18 1.676 m (5' 6\")      Wt Readings from Last 1 Encounters:   03/18/19 74.7 kg (164 lb 10.9 oz)    Estimated body mass index is 26.58 kg/m  as calculated from the following:    Height as of 11/14/18: 1.676 m (5' 6\").    Weight as of this encounter: 74.7 kg (164 lb 10.9 oz).     LDA:            Assessment:   ASA SCORE: 3    NPO Status: > 6 hours since completed Solid Foods   Documentation: H&P complete; Preop Testing complete; Consents complete   Proceeding: Proceed without further delay  Tobacco Use:  NO Active use of Tobacco/UNKNOWN Tobacco use status     Plan:   Anes. Type:  General   Pre-Induction: Acetaminophen PO; Gabapentin PO   Induction:  IV (Standard)   Airway: Oral ETT   Access/Monitoring: PIV   Maintenance: Balanced   Emergence: Procedure Site   Logistics: Same Day Surgery     Postop Pain/Sedation Strategy:  Standard-Options: Opioids PRN     PONV Management:  Adult Risk Factors: Female, Non-Smoker, Postop Opioids  Prevention: Ondansetron     CONSENT: Direct conversation   Plan and risks discussed with: Patient   Blood Products: Consented (ALL Blood Products)                         Max Pantoja MD  "

## 2019-03-18 NOTE — BRIEF OP NOTE
Orthopaedic Brief Operative Note 3/18/2019 9:17 AM    Patient: Marichuy Navarrete  MRN: [id number]       Pre-operative diagnosis: Left Sacroiliac Joint Pain   Post-operative diagnosis: Same   Procedure: Procedure(s):  STEALTH ASSISTED MINIMALLY INVASIVE SACRAL ILIAC JOINT FUSION LEFT     Surgeon:  Co-surgeon:  Resident:  Jason Matos MD none Eileen E Eggenberger       Anesthesia: General   Estimated blood loss: Minimal   Total IV fluids: (See anesthesia record)   Total urine output: (See anesthesia record)   Blood transfusion No transfusion was given during surgery   Drains: None   Specimens: None   Implants: See dictated operative report for full details   Grafts: none   Findings: See dictated operative report for full details   Complications: None   Disposition: Stable and extubated to PACU     Plan:    Pain: tylenol, oxycodone PO  Activity: Touch down weight bearing x3 weeks. Start physical therapy 3 weeks post op and advanced to WBAT at that time  Diet: Clear liquids and ADAT  DVT ppx: Mechanical only  Radiographs: none needed  Disposition: discharge to home today    Follow up with Dr. Matos in 3 weeks         Zelda Woodward MD  Pager: [phone]

## 2019-03-18 NOTE — OP NOTE
Procedure Date: 03/18/2019      PREOPERATIVE DIAGNOSIS:  Left sacroiliitis.      POSTOPERATIVE DIAGNOSIS:  Left sacroiliitis.      PROCEDURE:  Minimally invasive left sacroiliac joint fusion with image-guided technology.      SURGEON:  Jason Matos Jr., MD      ASSISTANT:  Zelda Woodward MD      INDICATION FOR OPERATION:  The patient is a 54-year-old female with a history of sacroiliitis, who has been evaluated at multiple places.  She has multiple sclerosis as a complicating condition which has made it a little bit difficult initially to figure out what was going on with her.  She has a long history of symptoms dating back to probably 2004.  Her Oswestry Disability Index score is 64%.  PROMIS-10 is 19.  Visual analog pain scale is 10/10.  Physical exam was positive for a Mejia finger test, positive Reynold, positive pelvic compression, positive pelvic gapping, positive Gaenslen's, and positive sacral thrust on the left side, negative thigh thrust.  Imaging displayed a dysmorphic up-sloped sacrum.  Image-guided injection have given her significant pain relief and she met the criteria for a minimally invasive SI joint fusion.  Risks, benefits, alternative treatments and expected outcomes were extensively discussed, and it was her desire to proceed.      DESCRIPTION OF TECHNICAL PROCEDURE USED:  The OR team was briefed about the plan.  The patient was brought to the operating room and underwent the induction of adequate general anesthesia and was then turned in position prone on a 4-poster frame on a Jose Antonio table.  She was then prepared and draped in the usual sterile fashion.  A timeout was done confirming the site and type of surgery.  She did receive prophylactic antibiotics.  The O-arm was brought in.  The right posterior superior iliac spine was palpated and checked with fluoroscopy.  Marcaine 0.25% with epinephrine was infiltrated, 10 mL over the right PSIS and then a Catia clamp used to dissect down to  the posterior superior iliac spine.  A short percutaneous reference pin was seated into place.  An intraoperative 3D spin with the O-arm was then obtained and transferred to the Stealth image-guided workstation.  This was now used to elif her entry points.  Given her dysmorphic sacrum, the trajectories were a little bit atypical as expected.  After the site was marked, then 10 mL of Marcaine was infiltrated in the skin and the skin incised.  An additional 30 mL of Marcaine was used to inject the proposed tracts for the implants.      Next, image-guided drill guide was used to navigate a K-wire into the S1 vertebral body through the ilium.  This was then drilled, broached, and a 7.0 x 60 mm iFuse 3D implant was seated into place and checked under fluoroscopy.      Next, attention was addressed to the S2 segment where in a similar fashion a navigated drill guide was used to place K-wire, which was checked under fluoroscopy.  Then this was drilled, broached, and a 7.0 x 50 mm iFuse 3D implant was seated into place.  2D and 3D images were obtained, which confirmed optimal position of the implants.  The wound was irrigated out and closed in layers.  Exparel 20 mL was used to infiltrate the surgical sites and then Dermabond was used on the skin.      Estimated blood loss for this case was 5 mL.  We utilized a total of 50 mL of Marcaine 0.25% with epinephrine and 20 mL of Exparel.      It is anticipated that the patient will be discharged home today.  She will be on crutches for 3 weeks and then follow up with physical therapy at 3 weeks to progress her back to normal weightbearing status and then she will return for routine followup in the clinic in 6 weeks.  If there are problems, issues or concerns, she can give my nurse a call.         ANGELES SIFUENTES JR, MD             D: 2019   T: 2019   MT: NITO      Name:     CLINT LU   MRN:      [id number]        Account:        [id number]   :       1965           Procedure Date: 03/18/2019      Document: [id number]       cc: Copy for Patient        DAVE SHAH MD

## 2019-03-18 NOTE — DISCHARGE INSTRUCTIONS
"Safety Tips for Using Crutches    Crutch Fit:    Assume good standing posture with shoulders relaxed and crutch tips 6-8 inches out from the side of the foot.    The underarm pad should fall 2-3 fingers width below the armpit.    The handgrip is positioned level with the wrist to allow 30  flexion at the elbow.    Safety Tips:    Bear weight on your hands, not on your armpits.    Do not add extra padding to the underarm pad. This will, in effect, lengthen the crutches and increase risk of nerve injury.    Wear flat, properly fitting shoes. Do not walk in stocking feet, high heels or slippers.    Household hazards:  --Throw rugs should be removed from floors.  --Stairs should be cleared of obstacles.  --Use extra caution on slippery, highly polished, littered or uneven floor surfaces.  --Check for electric cords.    Check crutch tips for excessive wear and keep wing nuts tight.    While walking, look forward with  head up  and  eyes open.  Take equal length steps.    Use BOTH crutches.    Stairs Sequence:    UP: \"Good\" leg first, followed by  bad  leg, then crutches.    DOWN: Crutches, followed by  bad  leg, \"good\" leg.     Walking with Crutches:    Move both crutches forward at the same time.    Non-Weight Bearing (NWB):  Hold the involved leg up and swing through the crutches with the involved leg. The involved leg does not touch the floor.    Toe Touch Weight Bearing (TTWB): Move the involved leg forward. Rest it lightly on the floor for balance only. Step through the crutches with the uninvolved leg.    Partial Weight Bearing (PWB): Move the involved leg forward. Step down the weight of the leg only.  Step through the crutches with the uninvolved leg.    Weight Bearing As Tolerated (WBAT): Move the involved leg forward. Put as much pressure through the involved leg as you can tolerate comfortably. Then step through the crutches with the uninvolved leg.    Rev. 4/2014  Same-Day Surgery   Adult Discharge Orders & " Instructions     For 24 hours after surgery:  1. Get plenty of rest.  A responsible adult must stay with you for at least 24 hours after you leave the hospital.   2. Pain medication can slow your reflexes. Do not drive or use heavy equipment.  If you have weakness or tingling, don't drive or use heavy equipment until this feeling goes away.  3. Mixing alcohol and pain medication can cause dizziness and slow your breathing. It can even be fatal. Do not drink alcohol while taking pain medication.  4. Avoid strenuous or risky activities.  Ask for help when climbing stairs.   5. You may feel lightheaded.  If so, sit for a few minutes before standing.  Have someone help you get up.   6. If you have nausea (feel sick to your stomach), drink only clear liquids such as apple juice, ginger ale, broth or 7-Up.  Rest may also help.  Be sure to drink enough fluids.  Move to a regular diet as you feel able. Take pain medications with a small amount of solid food, such as toast or crackers, to avoid nausea.   7. A slight fever is normal. Call the doctor if your fever is over 100 F (37.7 C) (taken under the tongue) or lasts longer than 24 hours.  8. You may have a dry mouth, muscle aches, trouble sleeping or a sore throat.  These symptoms should go away after 24 hours.  9. Do not make important or legal decisions.   Pain Management:      1. Take pain medication (if prescribed) for pain as directed by your physician.        2. WARNING: If the pain medication you have been prescribed contains Tylenol  (acetaminophen), DO NOT take additional doses of Tylenol (acetaminophen).     Call your doctor for any of the followin.  Signs of infection (fever, growing tenderness at the surgery site, severe pain, a large amount of drainage or bleeding, foul-smelling drainage, redness, swelling).    2.  It has been over 8 to 10 hours since surgery and you are still not able to urinate (pee).    3.  Headache for over 24 hours.    4.  Numbness,  tingling or weakness the day after surgery (if you had spinal anesthesia).  To contact a doctor, call _____________________________________ or:      [phone] and ask for the Resident On Call for:          __________________________________________ (answered 24 hours a day)      Emergency Department:  [facility]: [phone]  [place] [facility]: [phone]               Rev. 10/2014

## 2019-03-18 NOTE — ANESTHESIA CARE TRANSFER NOTE
Patient: Marichuy Navarrete    Procedure(s):  STEALTH ASSISTED MINIMALLY INVASIVE SACRAL ILIAC JOINT FUSION LEFT    Diagnosis: Left Sacroiliac Joint Pain  Diagnosis Additional Information: No value filed.    Anesthesia Type:   General, ETT     Note:  Airway :Face Mask  Patient transferred to:PACU  Handoff Report: Identifed the Patient, Identified the Reponsible Provider, Reviewed the pertinent medical history, Discussed the surgical course, Reviewed Intra-OP anesthesia mangement and issues during anesthesia, Set expectations for post-procedure period and Allowed opportunity for questions and acknowledgement of understanding      Vitals: (Last set prior to Anesthesia Care Transfer)    [facility] VITALS  3/18/2019 0845 - 3/18/2019 0922      3/18/2019             Temp:  36.4  C (97.5  F)                Electronically Signed By: YAZMIN Garcia CRNA  March 18, 2019  9:22 AM

## 2019-03-18 NOTE — OR NURSING
States feels better.   Wants to go home   Home instructions given   Voiced understanding  Will wait for family to come for her    Sons at bedside      VSS

## 2019-03-25 ENCOUNTER — TELEPHONE (OUTPATIENT)
Dept: ORTHOPEDICS | Facility: CLINIC | Age: 54
End: 2019-03-25

## 2019-03-25 DIAGNOSIS — M53.3 SI (SACROILIAC) JOINT DYSFUNCTION: ICD-10-CM

## 2019-03-25 RX ORDER — OXYCODONE HYDROCHLORIDE 5 MG/1
5-10 TABLET ORAL EVERY 4 HOURS PRN
Qty: 90 TABLET | Refills: 0 | Status: SHIPPED | OUTPATIENT
Start: 2019-03-25 | End: 2019-04-29

## 2019-03-25 NOTE — TELEPHONE ENCOUNTER
[facility] Call Center    Phone Message    May a detailed message be left on voicemail: yes    Reason for Call: Medication Refill Request    Has the patient contacted the pharmacy for the refill? Yes   Name of medication being requested: Oxycodone  Provider who prescribed the medication: Zelda Woodward, tammie Matos  [facility]  Date medication is needed: ASAP, Please call pt when ready.         Action Taken: Message routed to:  [facility] [place] [facility] Ortho    DOS 3-18-19 Left SI Joint Fusion.  Only got #30 Oxycodone tabs.  I called pt.  States she is doing well taking fluids & food.  NWB Left leg & has future PT appts. made-see epic.  Also taking Tylenol & Senna as ordered & having BM's.  Refilled Oxycodone & family will  in lock box here.  Pt., knows to take least amount possible. RTC POP appt. 5-2-19.  Call back prn. Pt. Agreed.  S.O>/Mikey Covington RN.

## 2019-04-08 ENCOUNTER — THERAPY VISIT (OUTPATIENT)
Dept: PHYSICAL THERAPY | Facility: CLINIC | Age: 54
End: 2019-04-08
Payer: COMMERCIAL

## 2019-04-08 DIAGNOSIS — M53.3 SACROILIAC PAIN: ICD-10-CM

## 2019-04-08 DIAGNOSIS — Z47.89 AFTERCARE FOLLOWING SURGERY OF THE MUSCULOSKELETAL SYSTEM: ICD-10-CM

## 2019-04-08 PROCEDURE — 97161 PT EVAL LOW COMPLEX 20 MIN: CPT | Mod: GP | Performed by: PHYSICAL THERAPIST

## 2019-04-08 PROCEDURE — 97530 THERAPEUTIC ACTIVITIES: CPT | Mod: GP | Performed by: PHYSICAL THERAPIST

## 2019-04-08 PROCEDURE — 97110 THERAPEUTIC EXERCISES: CPT | Mod: GP | Performed by: PHYSICAL THERAPIST

## 2019-04-08 NOTE — LETTER
DEPARTMENT OF HEALTH AND HUMAN SERVICES  CENTERS FOR MEDICARE & MEDICAID SERVICES    PLAN/UPDATED PLAN OF PROGRESS FOR OUTPATIENT REHABILITATION    PATIENTS NAME:  Marichuy Navarrete   : 1965  PROVIDER NUMBER:    [id number]  [id number]  PROVIDER NAME: [facility] PHYSICAL THERAPY [facility]  MEDICAL RECORD NUMBER: [id number]   START OF CARE DATE: 19     TYPE:  PT  PRIMARY/TREATMENT DIAGNOSIS: (Pertinent Medical Diagnosis)   Sacroiliac pain  Aftercare following surgery of the musculoskeletal system    VISITS FROM START OF CARE:  Rxs Used: 1     Physical Therapy Initial Evaluation  2019   MD Instructions/Precautions/Restrictions: PT eval and treat.   Therapist Impression:   Marichuy Navarrete presents with findings consistent with post-surgical status, with related impairments limiting her ability to sit, stand, walk, drive, navigate stairs, sleep. Skilled PT services are necessary in order to reduce impairments and improve independent function.     Subjective:   Date of Surgery: 3/18/19  C/C: s/p SI fusion of L side.   Quality of pain is dull and aching. Pains are described as intermittent in nature. Pain is worse: as day goes on. Pain is rated 7/10.   Worsened by: Standing, walking, sitting too long.    Alleviated by: Rest, Ice, Pain medications, Movement, PT and Surgery.    General health as reported by patient: good  Pertinent medical/surgical history: Refer to health history in EMR. Imaging: x-ray and MRI. 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 Patient's goals are: decrease pain. Return to MD:  Mid May.     Lumbar ROM:  Active ROM Limitation   Flexion Mod loss, no effect   Extension Mod loss, no effect    L R   Rotation     Sidebend Min loss, mild increase in pain Nil, no effect     Hip Screen:  (*Indicates patient s pain)   PROM L PROM R   Hip Flx 90 115   Hip IR 90/90 45 50   Hip ER 90/90 35 55   Hip Ext 5 15       Assessment/Plan:    The patient is a 54 year old female  with chief complaint of SI pain.    The patient has the following significant findings with corresponding treatment plan.  Diagnosis 1:  S/p SI fusion    Pain -  hot/cold therapy, manual therapy, splint/taping/bracing/orthotics and self management  Decreased ROM/flexibility - manual therapy, therapeutic exercise and home program  Decreased joint mobility - manual therapy, therapeutic exercise and home program  Decreased strength - therapeutic exercise, therapeutic activities and home program  Impaired balance - neuro re-education, therapeutic activities and home program  Decreased proprioception - neuro re-education and therapeutic activities  Impaired gait - gait training and assistive devices  Impaired muscle performance - neuro re-education and home program  Decreased function - therapeutic activities and home program  Impaired posture - neuro re-education, therapeutic activities and home program    Therapy Evaluation Codes:   1) History comprised of:   Personal factors that impact the plan of care:      Please refer to health history in EMR.    Comorbidity factors that impact the plan of care are:      Please refer to health history in EMR.     Medications impacting care: None.  2) Examination of Body Systems comprised of:   Body structures and functions that impact the plan of care:      Sacral illiac joint.   Activity limitations that impact the plan of care are:      Bathing, Bending, Cooking, Driving, Dressing, Lifting, Reading/Computer work, Sitting, Squatting/kneeling, Stairs, Standing, Walking, Sleeping and Laying down.   Clinical presentation characteristics are:    Stable/Uncomplicated.  3) Presentation comprised of:   Presentation scored as Low complexity with uncomplicated characteristics..  4) Decision-Making    Low complexity using standardized patient assessment instrument and/or measureable assessment of functional outcome.    Cumulative Therapy Evaluation is: Low complexity.    Previous and  "current functional limitations:  (See Goal Flow Sheet for this information)    Short term and Long term goals: (See Goal Flow Sheet for this information)     Communication ability:  Patient appears to be able to clearly communicate and understand verbal and written communication and follow directions correctly.  Treatment Explanation - The following has been discussed with the patient: RX ordered/plan of care, anticipated outcomes, and possible risks and side effects.  This patient would benefit from PT intervention to resume normal activities.   Rehab potential is good.    Frequency:  1 X week, once daily  Duration:  for 6 weeks tapering to 2x/month for 2 months  Discharge Plan: Achieve all LTGs, be independent in home treatment program, and reach maximal therapeutic benefit.    Please refer to the daily flowsheet for treatment today, total treatment time and time spent performing 1:1 timed codes.     Caregiver Signature/Credentials _____________________________ Date ________       Treating Provider: Buddy Hilario, PT     I have reviewed and certified the need for these services and plan of treatment while under my care.        PHYSICIAN'S SIGNATURE:   _________________________________________  Date___________   Jason Matos MD     Certification period:    5/4/19  to 8/2/19       Functional Level Progress Report: Please see attached \"Goal Flow sheet for Functional level.\"    ____X____ Continue Services or       ________ DC Services                Service dates: From  4/8/19  to present                         "

## 2019-04-08 NOTE — LETTER
DEPARTMENT OF HEALTH AND HUMAN SERVICES  CENTERS FOR MEDICARE & MEDICAID SERVICES    PLAN/UPDATED PLAN OF PROGRESS FOR OUTPATIENT REHABILITATION    PATIENTS NAME:  Marichuy Navarrete   : 1965  PROVIDER NUMBER:    [id number]  [id number]    PROVIDER NAME: [facility] PHYSICAL THERAPY [facility]  MEDICAL RECORD NUMBER: [id number]   START OF CARE DATE:  SOC Date: 19   TYPE:  PT  PRIMARY/TREATMENT DIAGNOSIS: (Pertinent Medical Diagnosis)  Sacroiliac pain  Aftercare following surgery of the musculoskeletal system    VISITS FROM START OF CARE:  Rxs Used: 1     Physical Therapy Initial Evaluation  2019     MD Instructions/Precautions/Restrictions: PT eval and treat.   Therapist Impression:   Marichuy Navarrete presents with findings consistent with post-surgical status, with related impairments limiting her ability to sit, stand, walk, drive, navigate stairs, sleep. Skilled PT services are necessary in order to reduce impairments and improve independent function.     Subjective:   Date of Surgery: 3/18/19  C/C: s/p SI fusion of L side.   Quality of pain is dull and aching. Pains are described as intermittent in nature. Pain is worse: as day goes on. Pain is rated 7/10.   Worsened by: Standing, walking, sitting too long.    Alleviated by: Rest, Ice, Pain medications, Movement, PT and Surgery.    General health as reported by patient: good  Pertinent medical/surgical history: Refer to health history in EMR. Imaging: x-ray and MRI. 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 Patient's goals are: decrease pain. Return to MD:  Mid May.     Lumbar ROM:  Active ROM Limitation   Flexion Mod loss, no effect   Extension Mod loss, no effect    L R   Rotation     Sidebend Min loss, mild increase in pain Nil, no effect     Hip Screen:  PATIENTS NAME:  Marichuy Navarrete   : 1965    (*Indicates patient s pain)   PROM L PROM R   Hip Flx 90 115   Hip IR 90/90 45 50   Hip ER 90/90 35 55   Hip Ext 5  15       Assessment/Plan:    The patient is a 54 year old female with chief complaint of SI pain.    The patient has the following significant findings with corresponding treatment plan.  Diagnosis 1:  S/p SI fusion    Pain -  hot/cold therapy, manual therapy, splint/taping/bracing/orthotics and self management  Decreased ROM/flexibility - manual therapy, therapeutic exercise and home program  Decreased joint mobility - manual therapy, therapeutic exercise and home program  Decreased strength - therapeutic exercise, therapeutic activities and home program  Impaired balance - neuro re-education, therapeutic activities and home program  Decreased proprioception - neuro re-education and therapeutic activities  Impaired gait - gait training and assistive devices  Impaired muscle performance - neuro re-education and home program  Decreased function - therapeutic activities and home program  Impaired posture - neuro re-education, therapeutic activities and home program  Therapy Evaluation Codes:   1) History comprised of:   Personal factors that impact the plan of care:      Please refer to health history in EMR.    Comorbidity factors that impact the plan of care are:      Please refer to health history in EMR.     Medications impacting care: None.  2) Examination of Body Systems comprised of:   Body structures and functions that impact the plan of care:      Sacral illiac joint.   Activity limitations that impact the plan of care are:      Bathing, Bending, Cooking, Driving, Dressing, Lifting, Reading/Computer work, Sitting, Squatting/kneeling, Stairs, Standing, Walking, Sleeping and Laying down.   Clinical presentation characteristics are:    Stable/Uncomplicated.  3) Presentation comprised of:   Presentation scored as Low complexity with uncomplicated characteristics..  4) Decision-Making    Low complexity using standardized patient assessment instrument and/or measureable assessment of functional outcome.  Cumulative  "Therapy Evaluation is: Low complexity.  Previous and current functional limitations:  (See Goal Flow Sheet for this information)    Short term and Long term goals: (See Goal Flow Sheet for this information)   Communication ability:  Patient appears to be able to clearly communicate and understand verbal and written communication and follow directions correctly.  Treatment Explanation - The following has been discussed with the patient: RX ordered/plan of care, anticipated outcomes, and possible risks and side effects.  This patient would benefit from PT intervention to resume normal activities.   Rehab potential is good.  Frequency:  1 X week, once daily  Duration:  for 6 weeks tapering to 2x/month for 2 months  Discharge Plan: Achieve all LTGs, be independent in home treatment program, and reach maximal therapeutic benefit.  Please refer to the daily flowsheet for treatment today, total treatment time and time spent   PATIENTS NAME:  Marichuy Navarrete   : 1965    performing 1:1 timed codes.     Caregiver Signature/Credentials _____________________________ Date ________       Treating Provider: Buddy Hilario DPT [facility]  I have reviewed and certified the need for these services and plan of treatment while under my care.        PHYSICIAN'S SIGNATURE:   ____________________________________  Date___________     Jason Matos MD    Certification period:  Beginning of Cert date period: 19 to  End of Cert period date:19     Functional Level Progress Report: Please see attached \"Goal Flow sheet for Functional level.\"    ____X____ Continue Services or       ________ DC Services                Service dates: From  SOC Date: 19 date to present                         "

## 2019-04-08 NOTE — PROGRESS NOTES
Physical Therapy Initial Evaluation  April 8, 2019     MD Instructions/Precautions/Restrictions: PT eval and treat.     Therapist Impression:   Marichuy Navarrete presents with findings consistent with post-surgical status, with related impairments limiting her ability to sit, stand, walk, drive, navigate stairs, sleep. Skilled PT services are necessary in order to reduce impairments and improve independent function.     Subjective:   Date of Surgery: 3/18/19  C/C: s/p SI fusion of L side.   Quality of pain is dull and aching. Pains are described as intermittent in nature. Pain is worse: as day goes on. Pain is rated 7/10.   Worsened by: Standing, walking, sitting too long.    Alleviated by: Rest, Ice, Pain medications, Movement, PT and Surgery.    General health as reported by patient: good  Pertinent medical/surgical history: Refer to health history in EMR. Imaging: x-ray and MRI. 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 Patient's goals are: decrease pain. Return to MD:  Mid May.     Lumbar ROM:  Active ROM Limitation   Flexion Mod loss, no effect   Extension Mod loss, no effect    L R   Rotation     Sidebend Min loss, mild increase in pain Nil, no effect     Hip Screen:  (*Indicates patient s pain)   PROM L PROM R   Hip Flx 90 115   Hip IR 90/90 45 50   Hip ER 90/90 35 55   Hip Ext 5 15       Assessment/Plan:    The patient is a 54 year old female with chief complaint of SI pain.    The patient has the following significant findings with corresponding treatment plan.  Diagnosis 1:  S/p SI fusion    Pain -  hot/cold therapy, manual therapy, splint/taping/bracing/orthotics and self management  Decreased ROM/flexibility - manual therapy, therapeutic exercise and home program  Decreased joint mobility - manual therapy, therapeutic exercise and home program  Decreased strength - therapeutic exercise, therapeutic activities and home program  Impaired balance - neuro re-education, therapeutic  activities and home program  Decreased proprioception - neuro re-education and therapeutic activities  Impaired gait - gait training and assistive devices  Impaired muscle performance - neuro re-education and home program  Decreased function - therapeutic activities and home program  Impaired posture - neuro re-education, therapeutic activities and home program    Therapy Evaluation Codes:   1) History comprised of:   Personal factors that impact the plan of care:      Please refer to health history in EMR.    Comorbidity factors that impact the plan of care are:      Please refer to health history in EMR.     Medications impacting care: None.  2) Examination of Body Systems comprised of:   Body structures and functions that impact the plan of care:      Sacral illiac joint.   Activity limitations that impact the plan of care are:      Bathing, Bending, Cooking, Driving, Dressing, Lifting, Reading/Computer work, Sitting, Squatting/kneeling, Stairs, Standing, Walking, Sleeping and Laying down.   Clinical presentation characteristics are:    Stable/Uncomplicated.  3) Presentation comprised of:   Presentation scored as Low complexity with uncomplicated characteristics..  4) Decision-Making    Low complexity using standardized patient assessment instrument and/or measureable assessment of functional outcome.  Cumulative Therapy Evaluation is: Low complexity.    Previous and current functional limitations:  (See Goal Flow Sheet for this information)    Short term and Long term goals: (See Goal Flow Sheet for this information)     Communication ability:  Patient appears to be able to clearly communicate and understand verbal and written communication and follow directions correctly.  Treatment Explanation - The following has been discussed with the patient: RX ordered/plan of care, anticipated outcomes, and possible risks and side effects.  This patient would benefit from PT intervention to resume normal activities.    Rehab potential is good.    Frequency:  1 X week, once daily  Duration:  for 6 weeks tapering to 2x/month for 2 months  Discharge Plan: Achieve all LTGs, be independent in home treatment program, and reach maximal therapeutic benefit.    Please refer to the daily flowsheet for treatment today, total treatment time and time spent performing 1:1 timed codes.

## 2019-04-10 ENCOUNTER — THERAPY VISIT (OUTPATIENT)
Dept: PHYSICAL THERAPY | Facility: CLINIC | Age: 54
End: 2019-04-10
Payer: COMMERCIAL

## 2019-04-10 DIAGNOSIS — Z47.89 AFTERCARE FOLLOWING SURGERY OF THE MUSCULOSKELETAL SYSTEM: ICD-10-CM

## 2019-04-10 DIAGNOSIS — M53.3 SACROILIAC PAIN: ICD-10-CM

## 2019-04-10 PROCEDURE — 97530 THERAPEUTIC ACTIVITIES: CPT | Mod: GP | Performed by: PHYSICAL THERAPIST

## 2019-04-10 PROCEDURE — 97110 THERAPEUTIC EXERCISES: CPT | Mod: GP | Performed by: PHYSICAL THERAPIST

## 2019-04-10 PROCEDURE — 97140 MANUAL THERAPY 1/> REGIONS: CPT | Mod: GP | Performed by: PHYSICAL THERAPIST

## 2019-04-19 ENCOUNTER — THERAPY VISIT (OUTPATIENT)
Dept: PHYSICAL THERAPY | Facility: CLINIC | Age: 54
End: 2019-04-19
Payer: COMMERCIAL

## 2019-04-19 DIAGNOSIS — Z47.89 AFTERCARE FOLLOWING SURGERY OF THE MUSCULOSKELETAL SYSTEM: ICD-10-CM

## 2019-04-19 DIAGNOSIS — M53.3 SACROILIAC PAIN: ICD-10-CM

## 2019-04-19 PROCEDURE — 97530 THERAPEUTIC ACTIVITIES: CPT | Mod: GP | Performed by: PHYSICAL THERAPIST

## 2019-04-19 PROCEDURE — 97110 THERAPEUTIC EXERCISES: CPT | Mod: GP | Performed by: PHYSICAL THERAPIST

## 2019-04-19 PROCEDURE — 97140 MANUAL THERAPY 1/> REGIONS: CPT | Mod: GP | Performed by: PHYSICAL THERAPIST

## 2019-04-26 ENCOUNTER — THERAPY VISIT (OUTPATIENT)
Dept: PHYSICAL THERAPY | Facility: CLINIC | Age: 54
End: 2019-04-26
Payer: COMMERCIAL

## 2019-04-26 DIAGNOSIS — M53.3 SACROILIAC PAIN: ICD-10-CM

## 2019-04-26 DIAGNOSIS — Z47.89 AFTERCARE FOLLOWING SURGERY OF THE MUSCULOSKELETAL SYSTEM: ICD-10-CM

## 2019-04-26 PROCEDURE — 97140 MANUAL THERAPY 1/> REGIONS: CPT | Mod: GP | Performed by: PHYSICAL THERAPIST

## 2019-04-26 PROCEDURE — 97110 THERAPEUTIC EXERCISES: CPT | Mod: GP | Performed by: PHYSICAL THERAPIST

## 2019-04-26 PROCEDURE — 97112 NEUROMUSCULAR REEDUCATION: CPT | Mod: GP | Performed by: PHYSICAL THERAPIST

## 2019-04-29 ENCOUNTER — TELEPHONE (OUTPATIENT)
Dept: ORTHOPEDICS | Facility: CLINIC | Age: 54
End: 2019-04-29

## 2019-04-29 DIAGNOSIS — M53.3 SI (SACROILIAC) JOINT DYSFUNCTION: ICD-10-CM

## 2019-04-29 RX ORDER — OXYCODONE HYDROCHLORIDE 5 MG/1
5-10 TABLET ORAL EVERY 4 HOURS PRN
Qty: 60 TABLET | Refills: 0 | Status: SHIPPED | OUTPATIENT
Start: 2019-04-29 | End: 2021-07-20

## 2019-04-29 NOTE — TELEPHONE ENCOUNTER
[facility] Call Center    Phone Message    May a detailed message be left on voicemail: yes    Reason for Call: Other: Pt requesting script refill of Oxycodone. Pt stated she will be out prior to her appt this thursday 5/2. Pt would like to pick it up at the clinic     Action Taken: Message routed to:  [facility] ortho.    See phone message.  I called pt.  Rates pain 7-8 & doing PT as ordered.  Also taking Tylenol as ordered. states takes Oxycodone 1 TID last refilled #90 on 3-25-19.  refilled Oxycodone with decreased quantity to #60 & pt. Will  at lock box.  RTC postop appt. This week 5-2-19.  Call back prn.  Pt. Agreed.  S.O./Marge Covington RN.

## 2019-05-01 DIAGNOSIS — M46.1 SACROILIITIS (H): Primary | ICD-10-CM

## 2019-06-04 DIAGNOSIS — M53.3 SACROILIAC JOINT PAIN: Primary | ICD-10-CM

## 2019-06-12 ENCOUNTER — ANCILLARY PROCEDURE (OUTPATIENT)
Dept: GENERAL RADIOLOGY | Facility: CLINIC | Age: 54
End: 2019-06-12
Attending: ORTHOPAEDIC SURGERY
Payer: MEDICAID

## 2019-06-12 ENCOUNTER — OFFICE VISIT (OUTPATIENT)
Dept: ORTHOPEDICS | Facility: CLINIC | Age: 54
End: 2019-06-12
Payer: MEDICAID

## 2019-06-12 DIAGNOSIS — M70.62 GREATER TROCHANTERIC BURSITIS OF BOTH HIPS: ICD-10-CM

## 2019-06-12 DIAGNOSIS — M70.61 GREATER TROCHANTERIC BURSITIS OF BOTH HIPS: ICD-10-CM

## 2019-06-12 DIAGNOSIS — Z98.1 HISTORY OF SURGICAL FUSION JOINT: Primary | ICD-10-CM

## 2019-06-12 ASSESSMENT — ENCOUNTER SYMPTOMS
SLEEP DISTURBANCES DUE TO BREATHING: 0
PALPITATIONS: 0
EXERCISE INTOLERANCE: 0
ORTHOPNEA: 0
LEG PAIN: 1
HYPERTENSION: 1
LIGHT-HEADEDNESS: 0
HYPOTENSION: 0
SYNCOPE: 0

## 2019-06-12 NOTE — LETTER
2019       RE: Marichuy Navarrete  [address]  [address]  [place] [address]     Dear Colleague,    Thank you for referring your patient, Marichuy Navarrete, to the [facility]. Please see a copy of my visit note below.    [facility]  Orthopedics  Jason Matos MD  2019     Name: Marichuy Navarrete  MRN: [id number]  Age: 54 year old  : 1965  Referring provider: Referred Self     Chief Complaint: Surgery follow up left SI fusion     Date of Surgery: 3/18/2019    Procedure: STEALTH ASSISTED MINIMALLY INVASIVE SACRAL ILIAC JOINT FUSION LEFT    History of Present Illness:   Marichuy Navarrete is a 54 year old female roughly 3 months status-post procedure above who presents for postoperative evaluation. This is the patient's first visit with me postoperatively. The patient reports she is doing well. States she is 5-60% better postoperatively. She is still experiencing pain in the back of her left thigh and buttocks when she walks. She voices no further concerns at this time.     MATHEW: 46   [id number]: 20  Pain scale 0-10: 5, back      Physical Examination:  There were no vitals taken for this visit.  Constitutional - Patient is healthy, well-nourished and appears stated age.  Respiratory - Patient is breathing normally and in no respiratory distress.  Skin - No suspicious rashes or lesions. Surgical incisions are well-healed.   Psychiatric - Normal mood and affect.  Cardiovascular - Peripheral pulses are normal.  Eyes - Visual acuity is normal to the written word.  ENT - Hearing intact to the spoken word.  Musculoskeletal - Non-antalgic gait without use of assistive devices. Tenderness overlying bilateral trochanteric bursa, positive Trendelenburg test on the left. Tender over the left ischial tuberosity.     Radiographs:   Radiographs of the pelvis - 3 views (2019):      I have independently reviewed the above imaging studies; the  results were discussed with the patient.     Assessment:   54 year old female roughly 3 months status post procedure above, progressing as expected with bilateral trochanteric bursitis (L>R) and left ischial tuberosity bursitis.     Plan:   Referred to physical therapy for gluteus medius strengthening and trochanteric bursa strengthening. If physical therapy does not resolve her buttock and left posterior thigh pain she will call my office to schedule an appointment with the sports medicine team for a greater trochanteric bursitis injection. Follow-up with me at 6 months postop. All questions answered in clinic.     Scribe Disclosure:  I, Harjit Vargas, am serving as a scribe to document services personally performed by Jason Matos MD at this visit, based upon the provider's statements to me. All documentation has been reviewed by the aforementioned provider prior to being entered into the official medical record.   Jason Matos MD

## 2019-06-12 NOTE — NURSING NOTE
Reason For Visit:   Chief Complaint   Patient presents with     RECHECK     Stealth Assisted Minimally Invasive Sacral Iliac Joint Fusion Left DOS: 3/18/19       Primary MD: Zamzam Oconnell  Ref. MD: Yareli Roblero     ?  No  Currently working? No.  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  Date of surgery: 3/18/19  Type of surgery: Stealth Assisted Minimally Invasive Sacral Iliac Joint Fusion Left   Smoker: No        There were no vitals taken for this visit.    Pain Assessment  Patient Currently in Pain: Yes  0-10 Pain Scale: 5  Primary Pain Location: Back    Oswestry (MATHEW) Questionnaire    OSWESTRY DISABILITY INDEX 6/12/2019   Count 10   Sum 23   Oswestry Score (%) 46            Neck Disability Index (NDI) Questionnaire    No flowsheet data found.           Visual Analog Pain Scale  Back Pain Scale 0-10: 5  Right leg pain: 0  Left leg pain: 10  Neck Pain Scale 0-10: 0  Right arm pain: 0  Left arm pain: 0    Promis 10 Assessment    PROMIS 10 6/12/2019   In general, would you say your health is: Good   In general, would you say your quality of life is: Fair   In general, how would you rate your physical health? Good   In general, how would you rate your mental health, including your mood and your ability to think? Good   In general, how would you rate your satisfaction with your social activities and relationships? Fair   In general, please rate how well you carry out your usual social activities and roles Fair   To what extent are you able to carry out your everyday physical activities such as walking, climbing stairs, carrying groceries, or moving a chair? A little   How often have you been bothered by emotional problems such as feeling anxious, depressed or irritable? Rarely   How would you rate your fatigue on average? Moderate   How would you rate your pain on average?   0 = No Pain  to  10 = Worst Imaginable Pain 10   In general, would  you say your health is: 3   In general, would you say your quality of life is: 2   In general, how would you rate your physical health? 3   In general, how would you rate your mental health, including your mood and your ability to think? 3   In general, how would you rate your satisfaction with your social activities and relationships? 2   In general, please rate how well you carry out your usual social activities and roles. (This includes activities at home, at work and in your community, and responsibilities as a parent, child, spouse, employee, friend, etc.) 2   To what extent are you able to carry out your everyday physical activities such as walking, climbing stairs, carrying groceries, or moving a chair? 2   In the past 7 days, how often have you been bothered by emotional problems such as feeling anxious, depressed, or irritable? 2   In the past 7 days, how would you rate your fatigue on average? 3   In the past 7 days, how would you rate your pain on average, where 0 means no pain, and 10 means worst imaginable pain? 10   Global Mental Health Score 11   Global Physical Health Score 9   PROMIS TOTAL - SUBSCORES 20                Marichuy Gallego, ATC

## 2019-06-12 NOTE — PROGRESS NOTES
[facility]  Orthopedics  Jason Matos MD  2019     Name: Marichuy Navarrete  MRN: [id number]  Age: 54 year old  : 1965  Referring provider: Referred Self     Chief Complaint: Surgery follow up left SI fusion     Date of Surgery: 3/18/2019    Procedure: STEALTH ASSISTED MINIMALLY INVASIVE SACRAL ILIAC JOINT FUSION LEFT    History of Present Illness:   Marichuy Navarrete is a 54 year old female roughly 3 months status-post procedure above who presents for postoperative evaluation. This is the patient's first visit with me postoperatively. The patient reports she is doing well. States she is 5-60% better postoperatively. She is still experiencing pain in the back of her left thigh and buttocks when she walks. She voices no further concerns at this time.     MATHEW: 46   Tgagbx65: 20  Pain scale 0-10: 5, back      Physical Examination:  There were no vitals taken for this visit.  Constitutional - Patient is healthy, well-nourished and appears stated age.  Respiratory - Patient is breathing normally and in no respiratory distress.  Skin - No suspicious rashes or lesions. Surgical incisions are well-healed.   Psychiatric - Normal mood and affect.  Cardiovascular - Peripheral pulses are normal.  Eyes - Visual acuity is normal to the written word.  ENT - Hearing intact to the spoken word.  Musculoskeletal - Non-antalgic gait without use of assistive devices. Tenderness overlying bilateral trochanteric bursa, positive Trendelenburg test on the left. Tender over the left ischial tuberosity.     Radiographs:   Radiographs of the pelvis - 3 views (2019):      I have independently reviewed the above imaging studies; the results were discussed with the patient.     Assessment:   54 year old female roughly 3 months status post procedure above, progressing as expected with bilateral trochanteric bursitis (L>R) and left ischial tuberosity bursitis.     Plan:   Referred to physical therapy for gluteus medius  strengthening and trochanteric bursa strengthening. If physical therapy does not resolve her buttock and left posterior thigh pain she will call my office to schedule an appointment with the sports medicine team for a greater trochanteric bursitis injection. Follow-up with me at 6 months postop. All questions answered in clinic.     Scribe Disclosure:  Harjit LOMELI, am serving as a scribe to document services personally performed by Jason Matos MD at this visit, based upon the provider's statements to me. All documentation has been reviewed by the aforementioned provider prior to being entered into the official medical record.   Jason Matos MD      Answers for HPI/ROS submitted by the patient on 6/12/2019   General Symptoms: No  Skin Symptoms: No  HENT Symptoms: No  EYE SYMPTOMS: No  HEART SYMPTOMS: Yes  LUNG SYMPTOMS: No  INTESTINAL SYMPTOMS: No  URINARY SYMPTOMS: No  GYNECOLOGIC SYMPTOMS: No  BREAST SYMPTOMS: No  SKELETAL SYMPTOMS: No  BLOOD SYMPTOMS: No  NERVOUS SYSTEM SYMPTOMS: No  MENTAL HEALTH SYMPTOMS: No  Chest pain or pressure: No  Fast or irregular heartbeat: No  Pain in legs with walking: Yes  Trouble breathing while lying down: No  Fingers or toes appear blue: No  High blood pressure: Yes  Low blood pressure: No  Fainting: No  Murmurs: No  Pacemaker: No  Varicose veins: No  Edema or swelling: No  Wake up at night with shortness of breath: No  Light-headedness: No  Exercise intolerance: No

## 2019-06-24 ENCOUNTER — THERAPY VISIT (OUTPATIENT)
Dept: PHYSICAL THERAPY | Facility: CLINIC | Age: 54
End: 2019-06-24
Payer: MEDICAID

## 2019-06-24 DIAGNOSIS — Z47.89 AFTERCARE FOLLOWING SURGERY OF THE MUSCULOSKELETAL SYSTEM: ICD-10-CM

## 2019-06-24 DIAGNOSIS — M53.3 SACROILIAC PAIN: ICD-10-CM

## 2019-06-24 PROCEDURE — 97112 NEUROMUSCULAR REEDUCATION: CPT | Mod: GP | Performed by: PHYSICAL THERAPIST

## 2019-06-24 PROCEDURE — 97110 THERAPEUTIC EXERCISES: CPT | Mod: GP | Performed by: PHYSICAL THERAPIST

## 2019-06-24 PROCEDURE — 97530 THERAPEUTIC ACTIVITIES: CPT | Mod: GP | Performed by: PHYSICAL THERAPIST

## 2019-06-24 NOTE — LETTER
DEPARTMENT OF HEALTH AND HUMAN SERVICES  CENTERS FOR MEDICARE & MEDICAID SERVICES    PLAN/UPDATED PLAN OF PROGRESS FOR OUTPATIENT REHABILITATION    PATIENTS NAME:  Marichuy Navarrete   : 1965  [facility] NUMBER:    [id number]  [id number]  PR[facility]ER NAME: [facility]  MEDICAL RECORD NUMBER: [id number]   START OF CARE DATE:  19    TYPE:  PT  PRIMARY/TREATMENT DIAGNOSIS: (Pertinent Medical Diagnosis)   Sacroiliac pain  Aftercare following surgery of the musculoskeletal system    VISITS FROM START OF CARE:  Rxs Used: 5     Progress Note/Medicare Recertification     Progress reporting period is from initial eval to 2019.     Patient seen for Rxs Used: 5 visits.    SUBJECTIVE  Subjective changes noted by patient:  Subjective: Returns to PT. Has had mixed compliance. Hip pain really bothersome. Saw Dr. Matos recently who said to work on hip strength, may return for greater trochanteric bursitis injection if continues to have pain. Overall SIJ feeling good. .  Current pain level is Current Pain level: 4/10.     Previous pain level was  Initial Pain level: 7/10.   Changes in function:  Yes (See Goal flowsheet attached for changes in current functional level)  Adverse reaction to treatment or activity: None    OBJECTIVE  Changes noted in objective findings: Objective: Glute med MMT 3/5 with pain. Exquisite palpation tenderness along glute med and trochanter.    ASSESSMENT/PLAN  Diagnosis: s/p SI fusion (L side)   DIAGP:  Diagnoses of Sacroiliac pain and Aftercare following surgery of the musculoskeletal system were pertinent to this visit.  Updated problem list and treatment plan:   Diagnosis 1:  S/p SI fusion (L side)    Pain -  hot/cold therapy, manual therapy, splint/taping/bracing/orthotics and self management  Decreased ROM/flexibility - manual therapy, therapeutic exercise and home program  Decreased joint mobility - manual therapy and therapeutic exercise  Decreased  "strength - therapeutic exercise, therapeutic activities and home program  Impaired balance - neuro re-education and therapeutic activities  Decreased proprioception - neuro re-education and therapeutic activities  Impaired gait - gait training  Impaired muscle performance - neuro re-education  Decreased function - therapeutic activities    STG/LTGs have been met or progress has been made towards goals:  Yes, please see goal flowsheet for most current information.    Assessment of Progress: The patient's condition is improving.  The patient's condition has potential to improve.  Self Management Plans:  Patient has been instructed in a home treatment program.  I have re-evaluated this patient and find that the nature, scope, duration and intensity of the therapy is appropriate for the medical condition of the patient.  Marichuy continues to require the following intervention to meet STG and LTG's:  PT.    Recommendations:  This patient would benefit from continued therapy.     Frequency:  2 X a month, once daily  Duration:  for 2 months      Caregiver Signature/Credentials _____________________________ Date ________       Treating Provider: Buddy Hilario, PT     I have reviewed and certified the need for these services and plan of treatment while under my care.        PHYSICIAN'S SIGNATURE:   _________________________________________  Date___________   Jason Matos MD     Certification period:   7/7/19  to  10/5/19      Functional Level Progress Report: Please see attached \"Goal Flow sheet for Functional level.\"    ____X____ Continue Services or       ________ DC Services                Service dates: From   4/8/19  to present                         "

## 2019-07-15 ENCOUNTER — THERAPY VISIT (OUTPATIENT)
Dept: PHYSICAL THERAPY | Facility: CLINIC | Age: 54
End: 2019-07-15
Payer: COMMERCIAL

## 2019-07-15 DIAGNOSIS — M53.3 SACROILIAC PAIN: ICD-10-CM

## 2019-07-15 DIAGNOSIS — Z47.89 AFTERCARE FOLLOWING SURGERY OF THE MUSCULOSKELETAL SYSTEM: ICD-10-CM

## 2019-07-15 PROCEDURE — 97110 THERAPEUTIC EXERCISES: CPT | Mod: GP | Performed by: PHYSICAL THERAPIST

## 2019-07-15 PROCEDURE — 97112 NEUROMUSCULAR REEDUCATION: CPT | Mod: GP | Performed by: PHYSICAL THERAPIST

## 2019-07-15 PROCEDURE — 97530 THERAPEUTIC ACTIVITIES: CPT | Mod: GP | Performed by: PHYSICAL THERAPIST

## 2019-07-22 ENCOUNTER — OFFICE VISIT (OUTPATIENT)
Dept: ORTHOPEDICS | Facility: CLINIC | Age: 54
End: 2019-07-22
Payer: COMMERCIAL

## 2019-07-22 VITALS — RESPIRATION RATE: 16 BRPM | WEIGHT: 158 LBS | BODY MASS INDEX: 25.39 KG/M2 | HEIGHT: 66 IN

## 2019-07-22 DIAGNOSIS — M70.62 TROCHANTERIC BURSITIS OF LEFT HIP: Primary | ICD-10-CM

## 2019-07-22 RX ORDER — LIDOCAINE HYDROCHLORIDE 10 MG/ML
6 INJECTION, SOLUTION EPIDURAL; INFILTRATION; INTRACAUDAL; PERINEURAL
Status: SHIPPED | OUTPATIENT
Start: 2019-07-22

## 2019-07-22 RX ORDER — TRIAMCINOLONE ACETONIDE 40 MG/ML
40 INJECTION, SUSPENSION INTRA-ARTICULAR; INTRAMUSCULAR
Status: SHIPPED | OUTPATIENT
Start: 2019-07-22

## 2019-07-22 RX ORDER — AMLODIPINE BESYLATE 5 MG/1
5 TABLET ORAL
COMMUNITY
Start: 2019-03-05

## 2019-07-22 RX ADMIN — TRIAMCINOLONE ACETONIDE 40 MG: 40 INJECTION, SUSPENSION INTRA-ARTICULAR; INTRAMUSCULAR at 09:05

## 2019-07-22 RX ADMIN — LIDOCAINE HYDROCHLORIDE 6 ML: 10 INJECTION, SOLUTION EPIDURAL; INFILTRATION; INTRACAUDAL; PERINEURAL at 09:05

## 2019-07-22 ASSESSMENT — MIFFLIN-ST. JEOR: SCORE: 1328.43

## 2019-07-22 NOTE — LETTER
"  7/22/2019      RE: Marichuy Navarrete  [address]  [place] [address]       Sports Medicine Clinic Visit    PCP: Zamzam Oconnell WICHO Navarrete is a 54 year old female who is seen  in consultation at the request of  presenting with left lateral hip and left inferior buttock pain    Injury: NA    Location of Pain:   Duration of Pain: 6 week(s)  Rating of Pain: 8/10  Pain is better with: ice, PT  Pain is worse with: Walking, sitting   Additional Features: she ambulates with a cane  Treatment so far consists of: Ice, PT, tylenol   Prior History of related problems: SI joint surgery     Resp 16   Ht 1.676 m (5' 6\")   Wt 71.7 kg (158 lb)   BMI 25.50 kg/m            PMH:  Past Medical History:   Diagnosis Date     Abnormal gait      Cervical radiculopathy      Hypertension      Lumbar radiculopathy      Multiple sclerosis (H)     LLE weakness     Osteopenia      Other chronic pain     SI joint pain, left sciatica     Pure hypercholesterolemia      Renal disease        Active problem list:  Patient Active Problem List   Diagnosis     Sacroiliac pain     Aftercare following surgery of the musculoskeletal system       FH:  No family history on file.    SH:  Social History     Socioeconomic History     Marital status:      Spouse name: Not on file     Number of children: Not on file     Years of education: Not on file     Highest education level: Not on file   Occupational History     Not on file   Social Needs     Financial resource strain: Not on file     Food insecurity:     Worry: Not on file     Inability: Not on file     Transportation needs:     Medical: Not on file     Non-medical: Not on file   Tobacco Use     Smoking status: Not on file   Substance and Sexual Activity     Alcohol use: Not on file     Drug use: Not on file     Sexual activity: Not on file   Lifestyle     Physical activity:     Days per week: Not on file     Minutes per session: Not on file     Stress: Not on " file   Relationships     Social connections:     Talks on phone: Not on file     Gets together: Not on file     Attends Alevism service: Not on file     Active member of club or organization: Not on file     Attends meetings of clubs or organizations: Not on file     Relationship status: Not on file     Intimate partner violence:     Fear of current or ex partner: Not on file     Emotionally abused: Not on file     Physically abused: Not on file     Forced sexual activity: Not on file   Other Topics Concern     Not on file   Social History Narrative     Not on file       MEDS:  See EMR, reviewed  ALL:  See EMR, reviewed    REVIEW OF SYSTEMS:  CONSTITUTIONAL:NEGATIVE for fever, chills, change in weight  INTEGUMENTARY/SKIN: NEGATIVE for worrisome rashes, moles or lesions  EYES: NEGATIVE for vision changes or irritation  ENT/MOUTH: NEGATIVE for ear, mouth and throat problems  RESP:NEGATIVE for significant cough or SOB  BREAST: NEGATIVE for masses, tenderness or discharge  CV: NEGATIVE for chest pain, palpitations or peripheral edema  GI: NEGATIVE for nausea, abdominal pain, heartburn, or change in bowel habits  :NEGATIVE for frequency, dysuria, or hematuria  :NEGATIVE for frequency, dysuria, or hematuria  NEURO: NEGATIVE for weakness, dizziness or paresthesias  ENDOCRINE: NEGATIVE for temperature intolerance, skin/hair changes  HEME/ALLERGY/IMMUNE: NEGATIVE for bleeding problems  PSYCHIATRIC: NEGATIVE for changes in mood or affect      Subjective: This 54-year-old female with a past history of multiple sclerosis is now 4 months status post an SI joint fusion.  She has residual discomfort at the left inferior buttock and also discomfort at the left lateral hip.  It bothers her with ambulation.  She is recently started seeing physical therapy for it and is gone to one visit.  Dr. Matos's office asked her to be seen here for a trochanteric injection for the lateral left hip pain.  She has found the SI fusion surgery  "overall to be helpful with her previous \"sciatic pain\".    Objective: She is tender over the greater trochanter on the left.  She is nontender over the anterior superior iliac spine.  Adequate range of motion at the hip to internal rotation external rotation abduction.  Straight leg raise is negative.  Strength is intact at the hip knee and foot.  Overlying skin is normal.  Appropriate conversation affect.    Assessment: Trochanteric bursitis left hip.  Status post SI joint fusion.  History of multiple sclerosis.    Plan: After informed consent about bleeding, infection or steroid flare, after prep with surgical scrub she was injected the left lateral hip with 1 cc of Kenalog 40 and 6 cc of 1% lidocaine with multiple passes made with a needle.  She tolerated this without difficulty and left the clinic ambulatory.  She knows I would not recommend multiple cortisone shots into soft tissues over time.  She will continue to see her physical therapist and follow-up with Dr. Matos.        Large Joint Injection/Arthocentesis: L greater trochanteric bursa  Date/Time: 7/22/2019 9:05 AM  Performed by: Celio Puga MD  Authorized by: Celio Puga MD     Indications:  Pain  Needle Size:  25 G  Guidance: landmark guided    Approach:  Lateral  Location:  Hip      Site:  L greater trochanteric bursa  Medications:  6 mL lidocaine (PF) 1 %; 40 mg triamcinolone 40 MG/ML  Procedure discussed: discussed risks, benefits, and alternatives    Consent Given by:  Patient  Timeout: timeout called immediately prior to procedure    Prep: patient was prepped and draped in usual sterile fashion     Scribed by Norris Chambers ATC for  on 7/22/19 at 9AM, based on providers statements to me.    Celio Puga MD    "

## 2019-07-22 NOTE — NURSING NOTE
[address] [address]  Dept: [phone]  ______________________________________________________________________________    Patient: Marichuy Navarrete   : 1965   MRN: [id number]   2019    INVASIVE PROCEDURE SAFETY CHECKLIST    Date: 19   Procedure:Left greater trochanteric bursa kenalog injection  Patient Name: Marichuy Navarrete  MRN: [id number]  YOB: 1965    Action: Complete sections as appropriate. Any discrepancy results in a HARD COPY until resolved.     PRE PROCEDURE:  Patient ID verified with 2 identifiers (name and  or MRN): Yes  Procedure and site verified with patient/designee (when able): Yes  Accurate consent documentation in medical record: Yes  H&P (or appropriate assessment) documented in medical record: Yes  H&P must be up to 20 days prior to procedure and updates within 24 hours of procedure as applicable: NA  Relevant diagnostic and radiology test results appropriately labeled and displayed as applicable: Yes  Procedure site(s) marked with provider initials: NA    TIMEOUT:  Time-Out performed immediately prior to starting procedure, including verbal and active participation of all team members addressing the following:Yes  * Correct patient identify  * Confirmed that the correct side and site are marked  * An accurate procedure consent form  * Agreement on the procedure to be done  * Correct patient position  * Relevant images and results are properly labeled and appropriately displayed  * The need to administer antibiotics or fluids for irrigation purposes during the procedure as applicable   * Safety precautions based on patient history or medication use    DURING PROCEDURE: Verification of correct person, site, and procedures any time the responsibility for care of the patient is transferred to another member of the care team.       Prior to injection, verified patient identity using patient's name  and date of birth.  Due to injection administration, patient instructed to remain in clinic for 15 minutes  afterwards, and to report any adverse reaction to me immediately.    Bursa injection was performed.      Drug Amount Wasted:  Yes: 24 mg/ml lidocaine   Vial/Syringe: single use vial   Expiration Date:  4/23      Norris Chambers ATC  July 22, 2019

## 2019-07-22 NOTE — PROGRESS NOTES
"Sports Medicine Clinic Visit    PCP: Zamzam Oconnell Landon is a 54 year old female who is seen  in consultation at the request of  presenting with left lateral hip and left inferior buttock pain    Injury: NA    Location of Pain:   Duration of Pain: 6 week(s)  Rating of Pain: 8/10  Pain is better with: ice, PT  Pain is worse with: Walking, sitting   Additional Features: she ambulates with a cane  Treatment so far consists of: Ice, PT, tylenol   Prior History of related problems: SI joint surgery     Resp 16   Ht 1.676 m (5' 6\")   Wt 71.7 kg (158 lb)   BMI 25.50 kg/m           PMH:  Past Medical History:   Diagnosis Date     Abnormal gait      Cervical radiculopathy      Hypertension      Lumbar radiculopathy      Multiple sclerosis (H)     LLE weakness     Osteopenia      Other chronic pain     SI joint pain, left sciatica     Pure hypercholesterolemia      Renal disease        Active problem list:  Patient Active Problem List   Diagnosis     Sacroiliac pain     Aftercare following surgery of the musculoskeletal system       FH:  No family history on file.    SH:  Social History     Socioeconomic History     Marital status:      Spouse name: Not on file     Number of children: Not on file     Years of education: Not on file     Highest education level: Not on file   Occupational History     Not on file   Social Needs     Financial resource strain: Not on file     Food insecurity:     Worry: Not on file     Inability: Not on file     Transportation needs:     Medical: Not on file     Non-medical: Not on file   Tobacco Use     Smoking status: Not on file   Substance and Sexual Activity     Alcohol use: Not on file     Drug use: Not on file     Sexual activity: Not on file   Lifestyle     Physical activity:     Days per week: Not on file     Minutes per session: Not on file     Stress: Not on file   Relationships     Social connections:     Talks on phone: Not on file     Gets together: " "Not on file     Attends Scientology service: Not on file     Active member of club or organization: Not on file     Attends meetings of clubs or organizations: Not on file     Relationship status: Not on file     Intimate partner violence:     Fear of current or ex partner: Not on file     Emotionally abused: Not on file     Physically abused: Not on file     Forced sexual activity: Not on file   Other Topics Concern     Not on file   Social History Narrative     Not on file       MEDS:  See EMR, reviewed  ALL:  See EMR, reviewed    REVIEW OF SYSTEMS:  CONSTITUTIONAL:NEGATIVE for fever, chills, change in weight  INTEGUMENTARY/SKIN: NEGATIVE for worrisome rashes, moles or lesions  EYES: NEGATIVE for vision changes or irritation  ENT/MOUTH: NEGATIVE for ear, mouth and throat problems  RESP:NEGATIVE for significant cough or SOB  BREAST: NEGATIVE for masses, tenderness or discharge  CV: NEGATIVE for chest pain, palpitations or peripheral edema  GI: NEGATIVE for nausea, abdominal pain, heartburn, or change in bowel habits  :NEGATIVE for frequency, dysuria, or hematuria  :NEGATIVE for frequency, dysuria, or hematuria  NEURO: NEGATIVE for weakness, dizziness or paresthesias  ENDOCRINE: NEGATIVE for temperature intolerance, skin/hair changes  HEME/ALLERGY/IMMUNE: NEGATIVE for bleeding problems  PSYCHIATRIC: NEGATIVE for changes in mood or affect      Subjective: This 54-year-old female with a past history of multiple sclerosis is now 4 months status post an SI joint fusion.  She has residual discomfort at the left inferior buttock and also discomfort at the left lateral hip.  It bothers her with ambulation.  She is recently started seeing physical therapy for it and is gone to one visit.  Dr. aMtos's office asked her to be seen here for a trochanteric injection for the lateral left hip pain.  She has found the SI fusion surgery overall to be helpful with her previous \"sciatic pain\".    Objective: She is tender over the " greater trochanter on the left.  She is nontender over the anterior superior iliac spine.  Adequate range of motion at the hip to internal rotation external rotation abduction.  Straight leg raise is negative.  Strength is intact at the hip knee and foot.  Overlying skin is normal.  Appropriate conversation affect.    Assessment: Trochanteric bursitis left hip.  Status post SI joint fusion.  History of multiple sclerosis.    Plan: After informed consent about bleeding, infection or steroid flare, after prep with surgical scrub she was injected the left lateral hip with 1 cc of Kenalog 40 and 6 cc of 1% lidocaine with multiple passes made with a needle.  She tolerated this without difficulty and left the clinic ambulatory.  She knows I would not recommend multiple cortisone shots into soft tissues over time.  She will continue to see her physical therapist and follow-up with Dr. Matos.        Large Joint Injection/Arthocentesis: L greater trochanteric bursa  Date/Time: 7/22/2019 9:05 AM  Performed by: Celio Puga MD  Authorized by: Celio Puga MD     Indications:  Pain  Needle Size:  25 G  Guidance: landmark guided    Approach:  Lateral  Location:  Hip      Site:  L greater trochanteric bursa  Medications:  6 mL lidocaine (PF) 1 %; 40 mg triamcinolone 40 MG/ML  Procedure discussed: discussed risks, benefits, and alternatives    Consent Given by:  Patient  Timeout: timeout called immediately prior to procedure    Prep: patient was prepped and draped in usual sterile fashion     Scribed by Norris Chambers ATC for  on 7/22/19 at 9AM, based on providers statements to me.

## 2019-08-24 ENCOUNTER — THERAPY VISIT (OUTPATIENT)
Dept: PHYSICAL THERAPY | Facility: CLINIC | Age: 54
End: 2019-08-24
Payer: COMMERCIAL

## 2019-08-24 DIAGNOSIS — M53.3 SACROILIAC PAIN: ICD-10-CM

## 2019-08-24 DIAGNOSIS — Z47.89 AFTERCARE FOLLOWING SURGERY OF THE MUSCULOSKELETAL SYSTEM: ICD-10-CM

## 2019-08-24 PROCEDURE — 97110 THERAPEUTIC EXERCISES: CPT | Mod: GP | Performed by: PHYSICAL THERAPIST

## 2019-08-24 PROCEDURE — 97112 NEUROMUSCULAR REEDUCATION: CPT | Mod: GP | Performed by: PHYSICAL THERAPIST

## 2019-08-24 PROCEDURE — 97530 THERAPEUTIC ACTIVITIES: CPT | Mod: GP | Performed by: PHYSICAL THERAPIST

## 2019-09-23 NOTE — PROGRESS NOTES
Progress Note    Progress reporting period is from initial eval to Jun 24, 2019.     Patient seen for Rxs Used: 5 visits.    SUBJECTIVE  Subjective changes noted by patient:  Subjective: Returns to PT. Has had mixed compliance. Hip pain really bothersome. Saw Dr. Matos recently who said to work on hip strength, may return for greater trochanteric bursitis injection if continues to have pain. Overall SIJ feeling good. .  Current pain level is Current Pain level: 4/10.     Previous pain level was  Initial Pain level: 7/10.   Changes in function:  Yes (See Goal flowsheet attached for changes in current functional level)  Adverse reaction to treatment or activity: None    OBJECTIVE  Changes noted in objective findings: Objective: Glute med MMT 3/5 with pain. Exquisite palpation tenderness along glute med and trochanter.    ASSESSMENT/PLAN  Diagnosis: s/p SI fusion (L side)   DIAGP:  Diagnoses of Sacroiliac pain and Aftercare following surgery of the musculoskeletal system were pertinent to this visit.  Updated problem list and treatment plan:   Diagnosis 1:  S/p SI fusion (L side)    Pain -  hot/cold therapy, manual therapy, splint/taping/bracing/orthotics and self management  Decreased ROM/flexibility - manual therapy, therapeutic exercise and home program  Decreased joint mobility - manual therapy and therapeutic exercise  Decreased strength - therapeutic exercise, therapeutic activities and home program  Impaired balance - neuro re-education and therapeutic activities  Decreased proprioception - neuro re-education and therapeutic activities  Impaired gait - gait training  Impaired muscle performance - neuro re-education  Decreased function - therapeutic activities    STG/LTGs have been met or progress has been made towards goals:  Yes, please see goal flowsheet for most current information.    Assessment of Progress: The patient's condition is improving.  The patient's condition has potential to improve.  Self  Management Plans:  Patient has been instructed in a home treatment program.  I have re-evaluated this patient and find that the nature, scope, duration and intensity of the therapy is appropriate for the medical condition of the patient.  Marichuy continues to require the following intervention to meet STG and LTG's:  PT.    Recommendations:  This patient would benefit from continued therapy.     Frequency:  2 X a month, once daily  Duration:  for 2 months      Buddy Hilario, PT, DPT      Please refer to the daily flowsheet for treatment today, total treatment time and time spent performing 1:1 timed codes.

## 2019-10-22 PROBLEM — Z47.89 AFTERCARE FOLLOWING SURGERY OF THE MUSCULOSKELETAL SYSTEM: Status: RESOLVED | Noted: 2019-04-08 | Resolved: 2019-10-22

## 2019-10-22 PROBLEM — M53.3 SACROILIAC PAIN: Status: RESOLVED | Noted: 2019-04-08 | Resolved: 2019-10-22

## 2019-10-22 NOTE — PROGRESS NOTES
Discharge Note    Progress reporting period is from initial eval to Aug 24, 2019.     Marichuy failed to return for next follow up visit and current status is unknown.  Please see information below for last relevant information on current status.  Patient seen for Rxs Used: 7 visits.    SUBJECTIVE  Subjective changes noted by patient:  Subjective: Doing better. Got trochanteric bursa injection, seems to have helped significantly. Now more bothered by her R SI region (feels it's a compensation with how she walks). Has to do lots of stairs now that she moves, that irritates too. .  Current pain level is Current Pain level: 3/10.     Previous pain level was  Initial Pain level: 7/10.   Changes in function:  Yes (See Goal flowsheet attached for changes in current functional level)  Adverse reaction to treatment or activity: None    OBJECTIVE  Changes noted in objective findings: Objective: Decreased palpation tenderness at greater trochanter. Able to progress pelvifemoral HEP with advancing # of reps.    ASSESSMENT/PLAN  Diagnosis: s/p SI fusion (L side)   DIAGP:  Diagnoses of Sacroiliac pain and Aftercare following surgery of the musculoskeletal system were pertinent to this visit.  Updated problem list and treatment plan:     Pain - HEP  Decreased ROM/flexibility - HEP  Decreased function - HEP  Decreased strength - HEP    STG/LTGs have been met or progress has been made towards goals:  Yes, please see goal flowsheet for most current information.    Assessment of Progress: current status is unknown.  Last current status: Assessment of progress: Pt is progressing slower than anticipated(not compliant with PT visits).  Self Management Plans:  HEP    I have re-evaluated this patient and find that the nature, scope, duration and intensity of the therapy is appropriate for the medical condition of the patient.  Marichuy continues to require the following intervention to meet STG and LTG's:  HEP.    Recommendations:  Discharge  with current home program.  Patient to follow up with MD as needed. Episode to be closed at this time and patient formally discharged from therapy.    Buddy Hilario, PT, DPT, OCS      Please refer to the daily flowsheet for treatment today, total treatment time and time spent performing 1:1 timed codes.

## 2020-03-05 ENCOUNTER — TRANSFERRED RECORDS (OUTPATIENT)
Dept: HEALTH INFORMATION MANAGEMENT | Facility: CLINIC | Age: 55
End: 2020-03-05

## 2020-03-11 ENCOUNTER — TRANSFERRED RECORDS (OUTPATIENT)
Dept: HEALTH INFORMATION MANAGEMENT | Facility: CLINIC | Age: 55
End: 2020-03-11

## 2020-03-13 ENCOUNTER — TRANSFERRED RECORDS (OUTPATIENT)
Dept: HEALTH INFORMATION MANAGEMENT | Facility: CLINIC | Age: 55
End: 2020-03-13

## 2020-11-23 ENCOUNTER — TRANSFERRED RECORDS (OUTPATIENT)
Dept: HEALTH INFORMATION MANAGEMENT | Facility: CLINIC | Age: 55
End: 2020-11-23

## 2020-12-28 ENCOUNTER — TRANSFERRED RECORDS (OUTPATIENT)
Dept: HEALTH INFORMATION MANAGEMENT | Facility: CLINIC | Age: 55
End: 2020-12-28

## 2021-01-18 ENCOUNTER — TRANSFERRED RECORDS (OUTPATIENT)
Dept: HEALTH INFORMATION MANAGEMENT | Facility: CLINIC | Age: 56
End: 2021-01-18

## 2021-01-19 ENCOUNTER — TRANSFERRED RECORDS (OUTPATIENT)
Dept: HEALTH INFORMATION MANAGEMENT | Facility: CLINIC | Age: 56
End: 2021-01-19

## 2021-01-20 ENCOUNTER — TRANSFERRED RECORDS (OUTPATIENT)
Dept: HEALTH INFORMATION MANAGEMENT | Facility: CLINIC | Age: 56
End: 2021-01-20

## 2021-01-20 ENCOUNTER — MEDICAL CORRESPONDENCE (OUTPATIENT)
Dept: HEALTH INFORMATION MANAGEMENT | Facility: CLINIC | Age: 56
End: 2021-01-20

## 2021-02-08 DIAGNOSIS — Z98.1 HISTORY OF SURGICAL FUSION JOINT: Primary | ICD-10-CM

## 2021-02-08 DIAGNOSIS — Z98.1 HX OF SPINAL FUSION: ICD-10-CM

## 2021-02-11 ENCOUNTER — ANCILLARY PROCEDURE (OUTPATIENT)
Dept: GENERAL RADIOLOGY | Facility: CLINIC | Age: 56
End: 2021-02-11
Attending: ORTHOPAEDIC SURGERY
Payer: COMMERCIAL

## 2021-02-11 ENCOUNTER — OFFICE VISIT (OUTPATIENT)
Dept: ORTHOPEDICS | Facility: CLINIC | Age: 56
End: 2021-02-11
Payer: COMMERCIAL

## 2021-02-11 VITALS — BODY MASS INDEX: 24.75 KG/M2 | WEIGHT: 157.7 LBS | HEIGHT: 67 IN

## 2021-02-11 DIAGNOSIS — M46.1 SACROILIITIS (H): Primary | ICD-10-CM

## 2021-02-11 DIAGNOSIS — G57.01 PIRIFORMIS SYNDROME OF RIGHT SIDE: ICD-10-CM

## 2021-02-11 DIAGNOSIS — M70.71 BURSITIS OF OTHER BURSA OF RIGHT HIP: ICD-10-CM

## 2021-02-11 PROBLEM — M70.70 BURSITIS OF HIP: Status: ACTIVE | Noted: 2021-02-11

## 2021-02-11 PROCEDURE — 99213 OFFICE O/P EST LOW 20 MIN: CPT | Performed by: ORTHOPAEDIC SURGERY

## 2021-02-11 PROCEDURE — 72190 X-RAY EXAM OF PELVIS: CPT | Performed by: RADIOLOGY

## 2021-02-11 RX ORDER — ROSUVASTATIN CALCIUM 20 MG/1
20 TABLET, COATED ORAL
COMMUNITY
Start: 2020-09-21

## 2021-02-11 ASSESSMENT — MIFFLIN-ST. JEOR: SCORE: 1330.56

## 2021-02-11 ASSESSMENT — PATIENT HEALTH QUESTIONNAIRE - PHQ9
SUM OF ALL RESPONSES TO PHQ QUESTIONS 1-9: 13
SUM OF ALL RESPONSES TO PHQ QUESTIONS 1-9: 13

## 2021-02-11 NOTE — PROGRESS NOTES
Depression Response    Patient completed the PHQ-9 assessment for depression and scored >9? Yes  Question 9 on the PHQ-9 was positive for suicidality? No  Does patient have current mental health provider? No    Is this a virtual visit? No    I personally notified the following: clinic nurse     Tamika Celestin LPN

## 2021-02-11 NOTE — NURSING NOTE
"Reason For Visit:   Chief Complaint   Patient presents with     RECHECK     L side SI joint - possible SI joint fusion on R side / Dr. Matias Roach        Primary MD: Zamzam Oconnell  Ref. MD: Dr. Matias Roach    ?  No  Occupation Not working.  .  Date of injury: no  Type of injury: no.    Date of surgery: 3/18/2019  Type of surgery: Minimally invasive left sacroiliac joint fusion with image-guided technology    Smoker: No  Request smoking cessation information: No    Ht 1.69 m (5' 6.54\")   Wt 71.5 kg (157 lb 11.2 oz)   BMI 25.05 kg/m      Pain Assessment  Patient Currently in Pain: Yes  0-10 Pain Scale: 9  Primary Pain Location: Back    Oswestry (MATHEW) Questionnaire    OSWESTRY DISABILITY INDEX 2/11/2021   Count 9   Sum 30   Oswestry Score (%) 66.67        Visual Analog Pain Scale  Back Pain Scale 0-10: 9  Right leg pain: 9  Left leg pain: 0  Neck Pain Scale 0-10: 0  Right arm pain: 0  Left arm pain: 0    Promis 10 Assessment    PROMIS 10 2/11/2021   In general, would you say your health is: Fair   In general, would you say your quality of life is: Fair   In general, how would you rate your physical health? Fair   In general, how would you rate your mental health, including your mood and your ability to think? Good   In general, how would you rate your satisfaction with your social activities and relationships? Fair   In general, please rate how well you carry out your usual social activities and roles Fair   To what extent are you able to carry out your everyday physical activities such as walking, climbing stairs, carrying groceries, or moving a chair? Not at all   How often have you been bothered by emotional problems such as feeling anxious, depressed or irritable? Rarely   How would you rate your fatigue on average? Moderate   How would you rate your pain on average?   0 = No Pain  to  10 = Worst Imaginable Pain 10   In general, would you say your health is: 2   In general, would you say your " quality of life is: 2   In general, how would you rate your physical health? 2   In general, how would you rate your mental health, including your mood and your ability to think? 3   In general, how would you rate your satisfaction with your social activities and relationships? 2   In general, please rate how well you carry out your usual social activities and roles. (This includes activities at home, at work and in your community, and responsibilities as a parent, child, spouse, employee, friend, etc.) 2   To what extent are you able to carry out your everyday physical activities such as walking, climbing stairs, carrying groceries, or moving a chair? 1   In the past 7 days, how often have you been bothered by emotional problems such as feeling anxious, depressed, or irritable? 2   In the past 7 days, how would you rate your fatigue on average? 3   In the past 7 days, how would you rate your pain on average, where 0 means no pain, and 10 means worst imaginable pain? 10   Global Mental Health Score 11   Global Physical Health Score 7   PROMIS TOTAL - SUBSCORES 18        Tamika Celestin LPN

## 2021-02-11 NOTE — PROGRESS NOTES
HISTORY OF PRESENT ILLNESS:  Marichuy presents today with complaint of right-sided buttock pain.  Previously she has undergone a left SI joint fusion, and she feels that that is 90% better than it was before.  Currently, she rates her visual analog pain scale at a 9, her Oswestry Disability Index score at a 66.7, and the pain is predominantly back pain and right leg pain.  She has undergone therapy in the past for the left side but not so much for the right side.  She has had 3 injections done at the [facility] in the fall and then again in November and December with a very positive response.      PHYSICAL EXAMINATION:  Exam shows a well-developed, well-nourished female in no acute distress.  Evaluation of her stance shows good coronal and sagittal alignment.  When asked to point where she hurts, she points to the lower buttock region and goes down the medial inside aspect of her right thigh.  On physical exam, she is significantly tender to palpation over the posterior superior iliac spine on the right but not on the left.  She has a tight quadratus lumborum on the right but not the left.  The quadratus lumborum is nontender.  She has a positive piriformis stretch test.  She is tender to palpation over the right ischial tuberosity.  Specific SI provocation exams:  Log roll on the hips is negative bilaterally.  The thigh thrust reproduces posterior hip capsular pain.  EDY test gives her more buttock pain.  She has a positive pelvic gapping test, positive pelvic compression test and positive Gaenslen test.      IMAGING:  Radiographic exam today includes AP and lateral Colbert view of the pelvis.  This shows the implants, 2 implants on the left side well positioned with no evidence of loosening.  Of note is she has a very dysmorphic sacrum.  The right side shows the SI joint with no obvious radiographic pathology.  Hip joint spaces appear well preserved, and the hip morphology appears normal.       ASSESSMENT:   1.  Sacroiliitis, right side.   2.  Piriformis syndrome, right side.   3.  Ischial tuberosity bursitis.      PLAN:  I explained to Marichuy what is going on and that she has several things in the area that I showed her with a diagram about what is present.  I would like to have her work with Buddy Hilario in our Physical Therapy, specifically for piriformis stretching, ischial tuberosity symptomatic treatment, and then specific SI therapy to see if we can help improve her symptoms.  If after 6 weeks she fails to have significant improvement, then I think she meets the North American Spine Society and International Society for Advancement of Spine Surgery criteria for an SI joint fusion.  So I would like to see her back and reassess at that point in time.  She is in agreement with this plan.  She is hopeful to proceed with the surgery because the left side had worked so well for her in the past.      Total contact time for this visit exceeded 25 minutes.       Answers for HPI/ROS submitted by the patient on 2/11/2021   PHQ9 TOTAL SCORE: 13

## 2021-02-11 NOTE — LETTER
2/11/2021         RE: Marichuy Navarrete  [address]  [place] [address]        Dear Colleague,    Thank you for referring your patient, Marichuy Navarrete, to the [facility]. Please see a copy of my visit note below.    Depression Response    Patient completed the PHQ-9 assessment for depression and scored >9? Yes  Question 9 on the PHQ-9 was positive for suicidality? No  Does patient have current mental health provider? No    Is this a virtual visit? No    I personally notified the following: clinic nurse     Tamika Celestin LPN               HISTORY OF PRESENT ILLNESS:  Marichuy presents today with complaint of right-sided buttock pain.  Previously she has undergone a left SI joint fusion, and she feels that that is 90% better than it was before.  Currently, she rates her visual analog pain scale at a 9, her Oswestry Disability Index score at a 66.7, and the pain is predominantly back pain and right leg pain.  She has undergone therapy in the past for the left side but not so much for the right side.  She has had 3 injections done at the [facility] in the fall and then again in November and December with a very positive response.      PHYSICAL EXAMINATION:  Exam shows a well-developed, well-nourished female in no acute distress.  Evaluation of her stance shows good coronal and sagittal alignment.  When asked to point where she hurts, she points to the lower buttock region and goes down the medial inside aspect of her right thigh.  On physical exam, she is significantly tender to palpation over the posterior superior iliac spine on the right but not on the left.  She has a tight quadratus lumborum on the right but not the left.  The quadratus lumborum is nontender.  She has a positive piriformis stretch test.  She is tender to palpation over the right ischial tuberosity.  Specific SI provocation exams:  Log roll on the hips is negative bilaterally.   The thigh thrust reproduces posterior hip capsular pain.  EDY test gives her more buttock pain.  She has a positive pelvic gapping test, positive pelvic compression test and positive Gaenslen test.      IMAGING:  Radiographic exam today includes AP and lateral Colbert view of the pelvis.  This shows the implants, 2 implants on the left side well positioned with no evidence of loosening.  Of note is she has a very dysmorphic sacrum.  The right side shows the SI joint with no obvious radiographic pathology.  Hip joint spaces appear well preserved, and the hip morphology appears normal.      ASSESSMENT:   1.  Sacroiliitis, right side.   2.  Piriformis syndrome, right side.   3.  Ischial tuberosity bursitis.      PLAN:  I explained to Marichuy what is going on and that she has several things in the area that I showed her with a diagram about what is present.  I would like to have her work with Buddy Angelicas in our Physical Therapy, specifically for piriformis stretching, ischial tuberosity symptomatic treatment, and then specific SI therapy to see if we can help improve her symptoms.  If after 6 weeks she fails to have significant improvement, then I think she meets the North American Spine Society and International Society for Advancement of Spine Surgery criteria for an SI joint fusion.  So I would like to see her back and reassess at that point in time.  She is in agreement with this plan.  She is hopeful to proceed with the surgery because the left side had worked so well for her in the past.      Total contact time for this visit exceeded 25 minutes.       Answers for HPI/ROS submitted by the patient on 2/11/2021   PHQ9 TOTAL SCORE: 13    Jason Matos MD

## 2021-02-12 ASSESSMENT — PATIENT HEALTH QUESTIONNAIRE - PHQ9: SUM OF ALL RESPONSES TO PHQ QUESTIONS 1-9: 13

## 2021-02-12 NOTE — NURSING NOTE
"[facility]:  PHQ-9 Screening Note    SITUATION/BACKGROUND                                                    Marichuy Navarrete is a 55 year old female who completed the PHQ-9 assessment for depression and Score is >9.    Onset of symptoms: gradual  Trigger: pain  Recent related events: pain  Prior history of suicide attempt or self harm: No   Risk Factors: anxiety  History of depression or mental illness: Yes  Medications reviewed: Yes     ASSESSMENT      A. Are any of the following present?      Suicidal thoughts with a plan and means to carry out the plan?    Intent to harm others    Altered mental status: confusion, delusional, psychotic YES  - Patient should be seen in the ED.  If patient is willing to go to the ED, call [facility] at [phone].  If patient is unwilling to go to the ED, call 911.   Clinic staff to fill out the  Transportation Hold  form.    Place order for referral to behavioral health team for  regular  follow-up.    NO - go to B   B. Are any of the following present?      Suicidal thoughts without a plan or means to carry out the plan    New onset of delusional ideas    Past inpatient admission for depression    New onset and recent change or addition of new medication YES  - Patient should receive crises care within 2-4 hours. Offer emergency room care or connect with any of the *crisis resources.     Place referral to behavioral health team for \"regular\" follow-up.    NO - go to C   C. Are any of the following present?      Previous suicide attempts    Depression interfering with ability to work or function    Loss of appetite and eating poorly    Abrupt cessation of drugs (OTC or RX), alcohol or caffeine    Drug or alcohol abuse YES -  [facility] behavior health team. If no response, patient should receive crisis care within 24 hours.     Place referral to behavioral health team for \"regular\" follow-up.     NO - go to D   D. Are several of the following " "present?      Difficulty concentrating    Difficulty sleeping    Reduced interest in sexual activity or impotency    Irregular or absent menstruation    No interest in activity    Change in interpersonal relationships    Increased use/abuse of alcohol or drugs    Pregnant or recent child birth    Recent major life change    History of depression YES -  Follow-up with PCP for appointment and follow home care instructions.    Place referral to behavioral health team for \"regular\" follow-up.    NO - provide home care instructions.        PLAN      Home Care Instructions:   If currently in counseling, call counselor for appointment    Report the following to your PCP:   Suicidal thoughts without a plan or means to carry out the plan  Depression interferes with daily activities    Seek emergency care immediately if any of the following occur:   Suicidal thoughts and plan and means to carry out the plan  Injury to self or others    BEHAVIORAL HEALTH TEAMS      [facility] [place] - Behavioral Health Team    [facility] Pager: [phone]    [place]  - Behavioral Health Team    Pager number: [phone]    Referral to Behavioral Health    BEHAVIORAL / SPIRITUAL HEALTH SOWQ [47196}    RESOURCES      - 24/7 Crisis Hotlines: National Suicide Prevention Hotline  621-772-DANA (7755)    Marge Covington RN     Pt states sees beh health provider & feels safe for self & others.    Marge Covington RN.          Copyright 2016 [employer]      "

## 2021-04-06 ENCOUNTER — THERAPY VISIT (OUTPATIENT)
Dept: PHYSICAL THERAPY | Facility: CLINIC | Age: 56
End: 2021-04-06
Payer: COMMERCIAL

## 2021-04-06 DIAGNOSIS — M70.71 BURSITIS OF OTHER BURSA OF RIGHT HIP: ICD-10-CM

## 2021-04-06 DIAGNOSIS — M46.1 SACROILIITIS (H): ICD-10-CM

## 2021-04-06 DIAGNOSIS — G57.01 PIRIFORMIS SYNDROME OF RIGHT SIDE: ICD-10-CM

## 2021-04-06 PROCEDURE — 97530 THERAPEUTIC ACTIVITIES: CPT | Mod: GP | Performed by: PHYSICAL THERAPIST

## 2021-04-06 PROCEDURE — 97110 THERAPEUTIC EXERCISES: CPT | Mod: GP | Performed by: PHYSICAL THERAPIST

## 2021-04-06 PROCEDURE — 97161 PT EVAL LOW COMPLEX 20 MIN: CPT | Mod: GP | Performed by: PHYSICAL THERAPIST

## 2021-04-06 NOTE — PROGRESS NOTES
Physical Therapy Initial Evaluation  April 6, 2021     MD Instructions/Precautions/Restrictions: PT eval and treat.     Therapist Impression:   Marichuy Navarrete presents with findings consistent with SIJ dysfunction with secondary piriformis syndrome and ischial bursitis, with related impairments limiting her ability to sit, lie down/sleep, . Skilled PT services are necessary in order to reduce impairments and improve independent function.     Subjective:   HPI: Former patient of mine who rehabbed following L SIJ fusion surgery. Since then, has developed R-sided low back and buttock symptoms. Now has ~50% of symptoms at her R sit bone and 50% of symptoms in R SIJ area/low back. Not having much actual buttock pain. L side following surgery feels excellent now. Having significant difficulty sitting, lying down, sleeping, walking. Had 3 SIJ injections in R side, with minimal to no improvement. Met with her surgeon who evaluated her R side, diagnosed with R sacroiilitis, piriformis syndrome, and ischial bursitis.     General health as reported by patient: good  Pertinent medical/surgical history: Refer to health history in EMR.   Imaging: x-ray and diagnostic injection.   Current occupational status: Not working.   Typical Physical Activities: Caring for grandchildren, house work.   Patient's goals are: decrease pain.   Return to MD:  End of May.       Objective:  Area(s) of Chief Complaint: low back right, SIJ right and glute right.     Lumbar Screen:  Active ROM Limitation   Flexion    Extension     L R   Rotation     Sidebend       Effect of Repeated Lumbar Movements: symptoms same.   Lumbopelvic rhythm: Increased lumbar contribution.   Beaulieu/quadrant test: Negative.     SIJ Provocative Tests:    Pain Provoked?   *Thigh Thrust Positive   Distraction Positive   EDY Negative   Gaenslen's NA   *Sidelying Compression Positive                          (3/5 positive, including one *item)    Hip Screen:  (*Indicates  patient s pain)   PROM L PROM R MMT L MMT R   Hip Flx 100 90* (R low back) na na   Hip IR 90/90 30 30* (ischium)     Hip ER 90/90 60 60     Hip ABD   3 3   Hip ADD   5 5   Hip Ext   3 3     HS length (measre at 70deg hip flexion): L side -60deg from full knee extension. R side: -40deg from full knee extension.   Significant palpation tenderness at R SIJ (dorsal sacral ligaments), piriformis, and ischium.       Assessment/Plan:    The patient is a 56 year old female with chief complaint of R SI/buttock pain.    The patient has the following significant findings with corresponding treatment plan.  Diagnosis 1:  R SIJ dysfunction with secondary piriformis syndrome and ischial bursitis    Pain -  hot/cold therapy, US, electric stimulation, manual therapy, splint/taping/bracing/orthotics, self management, education, directional preference exercise and home program  Decreased ROM/flexibility - manual therapy, therapeutic exercise and home program  Decreased joint mobility - manual therapy, therapeutic exercise and home program  Decreased strength - therapeutic exercise, therapeutic activities and home program  Impaired balance - neuro re-education, therapeutic activities and home program  Decreased proprioception - neuro re-education and therapeutic activities  Impaired gait - gait training and assistive devices  Impaired muscle performance - neuro re-education and home program  Decreased function - therapeutic activities and home program  Impaired posture - neuro re-education, therapeutic activities and home program  Instability -  Therapeutic Activity, Therapeutic Exercise, Neuromuscular Re-education, Splinting/Taping/Bracing/Orthotic, home program      Therapy Evaluation Codes:   1) History comprised of:   Personal factors that impact the plan of care:      Please refer to health history in EMR.    Comorbidity factors that impact the plan of care are:      Please refer to health history in EMR.     Medications  impacting care: None.  2) Examination of Body Systems comprised of:   Body structures and functions that impact the plan of care:      Sacral illiac joint.   Activity limitations that impact the plan of care are:      Bathing, Bending, Cooking, Driving, Dressing, Lifting, Sitting, Stairs, Standing, Walking, Sleeping and Laying down.   Clinical presentation characteristics are:    Stable/Uncomplicated.  3) Presentation comprised of:   Presentation scored as Low complexity with uncomplicated characteristics..  4) Decision-Making    Low complexity using standardized patient assessment instrument and/or measureable assessment of functional outcome.  Cumulative Therapy Evaluation is: Low complexity.    Previous and current functional limitations:  (See Goal Flow Sheet for this information)    Short term and Long term goals: (See Goal Flow Sheet for this information)     Communication ability:  Patient appears to be able to clearly communicate and understand verbal and written communication and follow directions correctly.  Treatment Explanation - The following has been discussed with the patient: RX ordered/plan of care, anticipated outcomes, and possible risks and side effects.  This patient would benefit from PT intervention to resume normal activities.   Rehab potential is fair to meet PT goals given chronicity of symptoms and failure to improve with prior treatment.    Frequency:  1 X week, once daily  Duration:  for 6 weeks  Discharge Plan: Achieve all LTGs, be independent in home treatment program, and reach maximal therapeutic benefit.    Please refer to the daily flowsheet for treatment today, total treatment time and time spent performing 1:1 timed codes.

## 2021-04-13 ENCOUNTER — THERAPY VISIT (OUTPATIENT)
Dept: PHYSICAL THERAPY | Facility: CLINIC | Age: 56
End: 2021-04-13
Payer: COMMERCIAL

## 2021-04-13 DIAGNOSIS — M70.70 BURSITIS OF HIP: ICD-10-CM

## 2021-04-13 DIAGNOSIS — M46.1 SACROILIITIS (H): ICD-10-CM

## 2021-04-13 DIAGNOSIS — G57.01 PIRIFORMIS SYNDROME OF RIGHT SIDE: ICD-10-CM

## 2021-04-13 PROCEDURE — 97140 MANUAL THERAPY 1/> REGIONS: CPT | Mod: GP | Performed by: PHYSICAL THERAPIST

## 2021-04-13 PROCEDURE — 97112 NEUROMUSCULAR REEDUCATION: CPT | Mod: GP | Performed by: PHYSICAL THERAPIST

## 2021-04-13 PROCEDURE — 97110 THERAPEUTIC EXERCISES: CPT | Mod: GP | Performed by: PHYSICAL THERAPIST

## 2021-04-20 ENCOUNTER — THERAPY VISIT (OUTPATIENT)
Dept: PHYSICAL THERAPY | Facility: CLINIC | Age: 56
End: 2021-04-20
Payer: COMMERCIAL

## 2021-04-20 DIAGNOSIS — G57.01 PIRIFORMIS SYNDROME OF RIGHT SIDE: ICD-10-CM

## 2021-04-20 DIAGNOSIS — M46.1 SACROILIITIS (H): ICD-10-CM

## 2021-04-20 DIAGNOSIS — M70.70 BURSITIS OF HIP: ICD-10-CM

## 2021-04-20 PROCEDURE — 97140 MANUAL THERAPY 1/> REGIONS: CPT | Mod: GP | Performed by: PHYSICAL THERAPIST

## 2021-04-20 PROCEDURE — 97110 THERAPEUTIC EXERCISES: CPT | Mod: GP | Performed by: PHYSICAL THERAPIST

## 2021-04-20 PROCEDURE — 97112 NEUROMUSCULAR REEDUCATION: CPT | Mod: GP | Performed by: PHYSICAL THERAPIST

## 2021-04-27 ENCOUNTER — THERAPY VISIT (OUTPATIENT)
Dept: PHYSICAL THERAPY | Facility: CLINIC | Age: 56
End: 2021-04-27
Payer: COMMERCIAL

## 2021-04-27 DIAGNOSIS — G57.01 PIRIFORMIS SYNDROME OF RIGHT SIDE: ICD-10-CM

## 2021-04-27 DIAGNOSIS — M46.1 SACROILIITIS (H): ICD-10-CM

## 2021-04-27 DIAGNOSIS — M70.70 BURSITIS OF HIP: ICD-10-CM

## 2021-04-27 PROCEDURE — 97112 NEUROMUSCULAR REEDUCATION: CPT | Mod: GP | Performed by: PHYSICAL THERAPIST

## 2021-04-27 PROCEDURE — 97110 THERAPEUTIC EXERCISES: CPT | Mod: GP | Performed by: PHYSICAL THERAPIST

## 2021-04-27 PROCEDURE — 97140 MANUAL THERAPY 1/> REGIONS: CPT | Mod: GP | Performed by: PHYSICAL THERAPIST

## 2021-05-10 DIAGNOSIS — M46.1 SACROILIITIS (H): Primary | ICD-10-CM

## 2021-05-20 ENCOUNTER — TELEPHONE (OUTPATIENT)
Dept: ORTHOPEDICS | Facility: CLINIC | Age: 56
End: 2021-05-20

## 2021-05-20 ENCOUNTER — ANCILLARY PROCEDURE (OUTPATIENT)
Dept: GENERAL RADIOLOGY | Facility: CLINIC | Age: 56
End: 2021-05-20
Attending: ORTHOPAEDIC SURGERY
Payer: COMMERCIAL

## 2021-05-20 ENCOUNTER — OFFICE VISIT (OUTPATIENT)
Dept: ORTHOPEDICS | Facility: CLINIC | Age: 56
End: 2021-05-20
Payer: COMMERCIAL

## 2021-05-20 VITALS — BODY MASS INDEX: 24.59 KG/M2 | HEIGHT: 66 IN | WEIGHT: 153 LBS

## 2021-05-20 DIAGNOSIS — G57.01 PIRIFORMIS SYNDROME OF RIGHT SIDE: Primary | ICD-10-CM

## 2021-05-20 DIAGNOSIS — M46.1 SACROILIITIS (H): ICD-10-CM

## 2021-05-20 PROCEDURE — 99214 OFFICE O/P EST MOD 30 MIN: CPT | Performed by: ORTHOPAEDIC SURGERY

## 2021-05-20 PROCEDURE — 72190 X-RAY EXAM OF PELVIS: CPT | Performed by: RADIOLOGY

## 2021-05-20 ASSESSMENT — MIFFLIN-ST. JEOR: SCORE: 1298

## 2021-05-20 NOTE — LETTER
5/20/2021         RE: Marichuy Navarrete  [address]  [place] [address]        Dear Colleague,    Thank you for referring your patient, Marichuy Navarrete, to the [facility] [place]. Please see a copy of my visit note below.    FOLLOWUP EVALUATION    HISTORY OF PRESENT ILLNESS:  She is now a little more than 2 years out from a minimally invasive left SI joint fusion which is doing very well for her but she is having significant right SI joint pain.  Current visual analog pain scale is 9.  Her Oswestry score is a 73.  She has been seeing Buddy Hilario for physical therapy and he has done some piriformis stretching with her.  She has had multiple injections at [facility] that she said have stopped working. Initially, the right-sided injections were giving her good relief with the steroid component but now, she is only getting a temporary transient response from the lidocaine.    OBJECTIVE:    GENERAL:  Physical exam shows well-developed, well-nourished female in mild-to-moderate discomfort.  She utilizes a cane for ambulation.   MUSCULOSKELETAL:  Her stance shows good coronal and sagittal alignment.  She is nontender on the left side.  On the right side, she is tender significantly over the posterior superior iliac spine over the long dorsal ligament and over the course of the piriformis muscle.  She is nontender over the ischial tuberosity and mildly tender over the right greater trochanter.  Negative on the left.   Her quadratus lumborum is nontender bilaterally.  Specific SI provocation maneuvers on the right; she is positive for thigh thrust, positive for EDY, positive for pelvic gapping, negative for pelvic compression, positive for Gaenslen.      IMAGING STUDIES:  Today include AP, lateral and Colbert of the pelvis.  This shows the left-sided implants in a dysmorphic sacrum, well fixed and no evidence of halo formation.    ASSESSMENT:   1.  Right piriformis syndrome.  2.   Right sacroiliitis.    PLAN:  I would like her to see Dr. Juan Manuel Borja for work on her right piriformis and specifically, I think she will need diagnostic and therapeutic blocks.  Whether or not this is local versus Botox would be up to his discretion.  If this fails, then I would consider going forward with her on a right, minimally invasive SI joint fusion.  I showed her the images.  I took her to the [employer] website and showed her the piriformis muscle and the patterns of compression of the sciatic nerve with this and she now has a better understanding of this and is in agreement with the plan to go forward.        Jason Matos MD

## 2021-05-20 NOTE — NURSING NOTE
"Reason For Visit:   Chief Complaint   Patient presents with     RECHECK     SI joint fusion on R side / Dr. Matias Roach        Primary MD: Zamzam Oconnell  Ref. MD: Est    ?  No  Occupation Not working.  .  Date of injury: no  Type of injury: no.     Date of surgery: 3/18/2019  Type of surgery: Minimally invasive left sacroiliac joint fusion with image-guided technology     Smoker: No  Request smoking cessation information: No    Ht 1.68 m (5' 6.14\")   Wt 69.4 kg (153 lb)   BMI 24.59 kg/m      Pain Assessment  Patient Currently in Pain: Yes  0-10 Pain Scale: 9  Primary Pain Location: Back    Oswestry (MATHEW) Questionnaire    OSWESTRY DISABILITY INDEX 5/20/2021   Count 9   Sum 33   Oswestry Score (%) 73.33          Visual Analog Pain Scale  Back Pain Scale 0-10: 9  Right leg pain: 7  Left leg pain: 0  Neck Pain Scale 0-10: 0  Right arm pain: 0  Left arm pain: 0    Promis 10 Assessment    PROMIS 10 5/20/2021   In general, would you say your health is: Fair   In general, would you say your quality of life is: Fair   In general, how would you rate your physical health? Fair   In general, how would you rate your mental health, including your mood and your ability to think? Fair   In general, how would you rate your satisfaction with your social activities and relationships? Poor   In general, please rate how well you carry out your usual social activities and roles Poor   To what extent are you able to carry out your everyday physical activities such as walking, climbing stairs, carrying groceries, or moving a chair? A little   How often have you been bothered by emotional problems such as feeling anxious, depressed or irritable? Never   How would you rate your fatigue on average? Moderate   How would you rate your pain on average?   0 = No Pain  to  10 = Worst Imaginable Pain 9   In general, would you say your health is: 2   In general, would you say your quality of life is: 2   In general, how would you rate " your physical health? 2   In general, how would you rate your mental health, including your mood and your ability to think? 2   In general, how would you rate your satisfaction with your social activities and relationships? 1   In general, please rate how well you carry out your usual social activities and roles. (This includes activities at home, at work and in your community, and responsibilities as a parent, child, spouse, employee, friend, etc.) 1   To what extent are you able to carry out your everyday physical activities such as walking, climbing stairs, carrying groceries, or moving a chair? 2   In the past 7 days, how often have you been bothered by emotional problems such as feeling anxious, depressed, or irritable? 1   In the past 7 days, how would you rate your fatigue on average? 3   In the past 7 days, how would you rate your pain on average, where 0 means no pain, and 10 means worst imaginable pain? 9   Global Mental Health Score 10   Global Physical Health Score 9   PROMIS TOTAL - SUBSCORES 19                Tamika Celestin LPN

## 2021-05-20 NOTE — PROGRESS NOTES
FOLLOWUP EVALUATION    HISTORY OF PRESENT ILLNESS:  She is now a little more than 2 years out from a minimally invasive left SI joint fusion which is doing very well for her but she is having significant right SI joint pain.  Current visual analog pain scale is 9.  Her Oswestry score is a 73.  She has been seeing Buddy Hilario for physical therapy and he has done some piriformis stretching with her.  She has had multiple injections at [facility] that she said have stopped working. Initially, the right-sided injections were giving her good relief with the steroid component but now, she is only getting a temporary transient response from the lidocaine.    OBJECTIVE:    GENERAL:  Physical exam shows well-developed, well-nourished female in mild-to-moderate discomfort.  She utilizes a cane for ambulation.   MUSCULOSKELETAL:  Her stance shows good coronal and sagittal alignment.  She is nontender on the left side.  On the right side, she is tender significantly over the posterior superior iliac spine over the long dorsal ligament and over the course of the piriformis muscle.  She is nontender over the ischial tuberosity and mildly tender over the right greater trochanter.  Negative on the left.   Her quadratus lumborum is nontender bilaterally.  Specific SI provocation maneuvers on the right; she is positive for thigh thrust, positive for EDY, positive for pelvic gapping, negative for pelvic compression, positive for Gaenslen.      IMAGING STUDIES:  Today include AP, lateral and Colbert of the pelvis.  This shows the left-sided implants in a dysmorphic sacrum, well fixed and no evidence of halo formation.    ASSESSMENT:   1.  Right piriformis syndrome.  2.  Right sacroiliitis.    PLAN:  I would like her to see Dr. Juan Manuel Borja for work on her right piriformis and specifically, I think she will need diagnostic and therapeutic blocks.  Whether or not this is local versus Botox would be up to his discretion.  If this fails,  then I would consider going forward with her on a right, minimally invasive SI joint fusion.  I showed her the images.  I took her to the [employer] website and showed her the piriformis muscle and the patterns of compression of the sciatic nerve with this and she now has a better understanding of this and is in agreement with the plan to go forward.

## 2021-05-20 NOTE — TELEPHONE ENCOUNTER
[facility] Call Center    Phone Message    May a detailed message be left on voicemail: yes     Reason for Call: Other: Pt was referred to Dr. Borja by Dr. Matos during today's visit. They need an order to schedule with Dr. Borja, please place this order in Epic     Action Taken: Message routed to:  [facility] ([facility] ortho    Travel Screening: Not Applicable

## 2021-05-21 DIAGNOSIS — G57.01 PIRIFORMIS SYNDROME, RIGHT: Primary | ICD-10-CM

## 2021-06-19 ENCOUNTER — HEALTH MAINTENANCE LETTER (OUTPATIENT)
Age: 56
End: 2021-06-19

## 2021-07-19 ENCOUNTER — TELEPHONE (OUTPATIENT)
Dept: PHYSICAL MEDICINE AND REHAB | Facility: CLINIC | Age: 56
End: 2021-07-19

## 2021-07-19 ENCOUNTER — OFFICE VISIT (OUTPATIENT)
Dept: PHYSICAL MEDICINE AND REHAB | Facility: CLINIC | Age: 56
End: 2021-07-19
Attending: ORTHOPAEDIC SURGERY
Payer: COMMERCIAL

## 2021-07-19 VITALS
RESPIRATION RATE: 16 BRPM | BODY MASS INDEX: 24.27 KG/M2 | DIASTOLIC BLOOD PRESSURE: 84 MMHG | SYSTOLIC BLOOD PRESSURE: 136 MMHG | HEIGHT: 66 IN | WEIGHT: 151 LBS | HEART RATE: 69 BPM | OXYGEN SATURATION: 100 %

## 2021-07-19 DIAGNOSIS — M79.18 GLUTEAL PAIN: Primary | ICD-10-CM

## 2021-07-19 DIAGNOSIS — M43.28 FUSION OF SACRAL REGION OF SPINE: ICD-10-CM

## 2021-07-19 DIAGNOSIS — T14.8XXA HEMATOMA OF SKIN: ICD-10-CM

## 2021-07-19 PROCEDURE — 99204 OFFICE O/P NEW MOD 45 MIN: CPT | Mod: GC | Performed by: PHYSICAL MEDICINE & REHABILITATION

## 2021-07-19 RX ORDER — TIZANIDINE 2 MG/1
2-4 TABLET ORAL
COMMUNITY
Start: 2020-09-08

## 2021-07-19 RX ORDER — HYDROXYZINE HYDROCHLORIDE 25 MG/1
TABLET, FILM COATED ORAL
COMMUNITY
Start: 2020-09-08

## 2021-07-19 ASSESSMENT — PAIN SCALES - GENERAL: PAINLEVEL: WORST PAIN (10)

## 2021-07-19 ASSESSMENT — MIFFLIN-ST. JEOR: SCORE: 1286.68

## 2021-07-19 NOTE — LETTER
"7/19/2021       RE: Marichuy Navarrete  [address]  [place] [address]     Dear Colleague,    Thank you for referring your patient, Marichuy Navarrete, to the [facility] [place] at [facility]. Please see a copy of my visit note below.    Patient seen at the request of Jason Matos for an opinion and evaluation of piriformis syndrome.      HISTORY OF PRESENT ILLNESS:  Marichuy Navarrete is a 56 year old female who presents with a chief complaint of R leg pain.     Pain began around February and not associated with trauma.  Previously her pain was relieved by lumbar JOEL and piriformis injections with lidocaine at [facility], however they have since stopped providing relief. The pain is localized to the right leg; extending from her hip to her foot. She usually has no numbness/tingling at baseline but recently developed paresthesias extending from her hips down to the top of her foot on the right.  New over the same time, she noticed \"dark spots\" and tenderness on her buttocks.  She denies any trauma/falls, tissue intervention to the area (dry needling, deep tissue massage).  The only changes she has been walking more and continues during her stretching exercises.  Does not take anticoagulation.    Functional limitations: As a result of her new pain/tingling, she now is using a cane to walk. Prior to a few days ago, she did not require any assistive devices for mobility. Otherwise independent in ADLs      PRIOR INJURIES/TREATMENT:   Ice/Heat: none  Brace: none  Physical Therapy:   2021: piriformis syndrome w/ Buddy Hilario    Prior Related Surgery: Left SI joint fusion (2019)  Other (acupuncture, OMT, CMM, TENS, DME, etc.): None    Specialists Seen - (with most recent, available notes and clinic visits reviewed)   1. Orthopedics    IMAGING - reviewed   XR pelvis 5/20/21  Findings:     AP, outlet, and lateral  view(s) " of the pelvis were obtained.      No acute osseous abnormality.     Postsurgical changes of left sacroiliac joint instrumentation without  evidence of hardware complication.     Loss of normal sphericity of bilateral femoral heads with relative  preservation of joint spaces.     Sacrum and innominate bones are partially obscured by overlying bowel  gas/fecal content.     Pelvic phlebolith.                                                                   Impression:  Postsurgical changes of left sacroiliac joint  instrumentation without evidence of hardware complication.    05/20/2021 MRI lumbar spine ([facility])  Interpretation:  Segment and alignment: Normal segmentation of the lumbar vertebral elements with a vestigial S1-2 disc which shows normal signal characteristics.  S1 is a nonmobile segment.  Sacrum and sacroiliac joints: Interim left SI joint fusion using a cage assisted minimally invasive technique is apparent.  Limited views of the right sacrum and sacroiliac joint are normal.  Conus/distal cord: Normal signal intensity within the conus medullaris and visualized lower spinal cord is demonstrated.  No intradural mass or arachnoidal adhesions are evident.  Osseous and paraspinous structures: There are no paraspinous soft tissue abnormalities.  L5-S1: Disc signal and contour are normal.  The canal and foramina are widely patent.  L4-L5: There is interim progression of disc degeneration with left paracentral annular fissure, and left foraminal and far lateral broad-based 4 mm protrusion.  There is mild to moderate foraminal stenosis without ganglionic impingement.  The canal remains patent.  L3-L4: Early disc degeneration is evident.  There is subtle annular bulging with a broad-based 4 mm left foraminal and far lateral protrusion, increased in size since the previous exam.  There is crowding but no impingement of the exiting nerve root ganglion.  The right foramen and canal remain patent.  The  "L2-3, L1-2, and L T12-L1 levels are unremarkable.    Conclusion:  1.  Mild interval progression of degenerative changes now with 4 mm left foraminal and far lateral broad-based protrusions at L3-4 and L4-5.  No ganglionic impingement.  2.  There is a new annular fissure on the left at L4-5.  3.  No fracture or significant facet arthropathy.    Review Of Systems:  I am responding to those symptoms which are directly relevant to the specific indication for my consultation. I recommend that the patient follow up with their primary or referring provider to pursue any other symptoms which may be of concern.       Medical History:  She  has a past medical history of Abnormal gait, Cervical radiculopathy, Hypertension, Lumbar radiculopathy, Multiple sclerosis (H), Osteopenia, Other chronic pain, Pure hypercholesterolemia, and Renal disease.     She  has a past surgical history that includes Optical tracking system fusion sacral iliac (Left, 3/18/2019).    Family History  Her family history is not on file.     Social History:  She  reports that she has never smoked. She has never used smokeless tobacco.        Current Medications:   She has a current medication list which includes the following prescription(s): acetaminophen, amlodipine, hydroxyzine, rosuvastatin, senna-docusate sodium, tizanidine, cholecalciferol, and oxycodone, and the following Facility-Administered Medications: lidocaine (pf) and triamcinolone.     Allergies:    -- Ibuprofen     --  Other reaction(s): GI Upset    PHYSICAL EXAMINATION:  /84   Pulse 69   Resp 16   Ht 1.676 m (5' 6\")   Wt 68.5 kg (151 lb)   SpO2 100%   BMI 24.37 kg/m    General: NAD, alert and attentive.  Pleasant.  Cane resting on chair  CV: Distal pulses palpable, symmetric  Pulm: Breathing comfortably on room air  Skin: Dark ecchymoses on right ischial tuberosity covering an area of 4in x 6in  MSK/Neuro: Strength 5/5 in all lower extremity myotomes on right, 4/5 in hip " flexion on L.DTR 2+ , symmetric.  No clonus.  Sensation to light touch intact throughout lower extremities, symmetric.     Limited bedside ultrasound of right posterior hip without soco fluid fluid collection however there is evidence of hematoma extending down approximately 3 cm.       ASSESSMENT:  Marichuy Navarrete is a pleasant 56 year old female who presents with right leg pain worsened over the past few days accompanied by new paresthesias into the top of her foot and hematoma of her posterior hip.    There are no gross objective findings for radiculopathy on exam, however, she does have quite significant pain and associated allodynia over the area of hematoma/bruising.  Because of this worsening in symptoms and the hematoma, do not recommend proceeding with injection today.  It is unclear what is the source of her bleeding given she reports no trauma to the area and is not on any anticoagulation.  As such it is reasonable pursue further imaging to attempt to elucidate the source of her bleeding, which will also offer further insight to other sources of her pain. Following further imaging and resolution of hematoma, may consider piriformis injection if her symptoms symptoms persist.    PLAN:  -MRI pelvis [facility] [place] per patient preference.   -Continue to monitor symptoms as bruising resolves  -Will establish further plan following results of imaging studies.    Ready to learn, no apparent learning barriers.  Education provided on treatment plan according to patient's preferred learning style.  Patient verbalizes understanding.     Patient was seen and discussed with Dr. Borja who agrees with the assessment and plan.    __________________________________  Jan Goldsmith MD  Physical Medicine & Rehabilitation, PGY2    I was physically present for the E/M service provided. I agree with the House Officer note and plan, which I have reviewed and edited where appropriate. Unclear right gluteal hematoma with new  onset pain and subjective paraesthesias affecting the RLE. MRI Pelvis to further evaluate involvement. Once symptoms improve/resolve and after review of imagine may plan to schedule piriformis muscle injection as per referral.   ________________________________   Juan Manuel Borja MD  Department of Physical Medicine & Rehabilitation         Again, thank you for allowing me to participate in the care of your patient.      Sincerely,    Juan Manuel Borja MD

## 2021-07-19 NOTE — TELEPHONE ENCOUNTER
MRI orders faxed to [facility] in [place] per patient's request.     Faxed MRI July 19, 2021 to fax number [phone]    Right Fax confirmed at 1629 PM         Heber Brian RN, BSN, PHN

## 2021-07-19 NOTE — NURSING NOTE
Chief Complaint   Patient presents with     Lumbar/SI     [facility] New Spine - Right piriformis and diagnostic therapeutic blocks     Magen Dietrich

## 2021-07-19 NOTE — PROGRESS NOTES
"Patient seen at the request of Jason Matos for an opinion and evaluation of piriformis syndrome.      HISTORY OF PRESENT ILLNESS:  Marichuy Navarrete is a 56 year old female who presents with a chief complaint of R leg pain.     Pain began around February and not associated with trauma.  Previously her pain was relieved by lumbar JOEL and piriformis injections with lidocaine at [facility], however they have since stopped providing relief. The pain is localized to the right leg; extending from her hip to her foot. She usually has no numbness/tingling at baseline but recently developed paresthesias extending from her hips down to the top of her foot on the right.  New over the same time, she noticed \"dark spots\" and tenderness on her buttocks.  She denies any trauma/falls, tissue intervention to the area (dry needling, deep tissue massage).  The only changes she has been walking more and continues during her stretching exercises.  Does not take anticoagulation.    Functional limitations: As a result of her new pain/tingling, she now is using a cane to walk. Prior to a few days ago, she did not require any assistive devices for mobility. Otherwise independent in ADLs      PRIOR INJURIES/TREATMENT:   Ice/Heat: none  Brace: none  Physical Therapy:   2021: piriformis syndrome w/ Buddy Hilario    Prior Related Surgery: Left SI joint fusion (2019)  Other (acupuncture, OMT, CMM, TENS, DME, etc.): None    Specialists Seen - (with most recent, available notes and clinic visits reviewed)   1. Orthopedics    IMAGING - reviewed   XR pelvis 5/20/21  Findings:     AP, outlet, and lateral  view(s) of the pelvis were obtained.      No acute osseous abnormality.     Postsurgical changes of left sacroiliac joint instrumentation without  evidence of hardware complication.     Loss of normal sphericity of bilateral femoral heads with relative  preservation of joint spaces.     Sacrum and innominate bones are partially obscured by overlying " bowel  gas/fecal content.     Pelvic phlebolith.                                                                   Impression:  Postsurgical changes of left sacroiliac joint  instrumentation without evidence of hardware complication.    05/20/2021 MRI lumbar spine ([facility])  Interpretation:  Segment and alignment: Normal segmentation of the lumbar vertebral elements with a vestigial S1-2 disc which shows normal signal characteristics.  S1 is a nonmobile segment.  Sacrum and sacroiliac joints: Interim left SI joint fusion using a cage assisted minimally invasive technique is apparent.  Limited views of the right sacrum and sacroiliac joint are normal.  Conus/distal cord: Normal signal intensity within the conus medullaris and visualized lower spinal cord is demonstrated.  No intradural mass or arachnoidal adhesions are evident.  Osseous and paraspinous structures: There are no paraspinous soft tissue abnormalities.  L5-S1: Disc signal and contour are normal.  The canal and foramina are widely patent.  L4-L5: There is interim progression of disc degeneration with left paracentral annular fissure, and left foraminal and far lateral broad-based 4 mm protrusion.  There is mild to moderate foraminal stenosis without ganglionic impingement.  The canal remains patent.  L3-L4: Early disc degeneration is evident.  There is subtle annular bulging with a broad-based 4 mm left foraminal and far lateral protrusion, increased in size since the previous exam.  There is crowding but no impingement of the exiting nerve root ganglion.  The right foramen and canal remain patent.  The L2-3, L1-2, and L T12-L1 levels are unremarkable.    Conclusion:  1.  Mild interval progression of degenerative changes now with 4 mm left foraminal and far lateral broad-based protrusions at L3-4 and L4-5.  No ganglionic impingement.  2.  There is a new annular fissure on the left at L4-5.  3.  No fracture or significant facet  "arthropathy.    Review Of Systems:  I am responding to those symptoms which are directly relevant to the specific indication for my consultation. I recommend that the patient follow up with their primary or referring provider to pursue any other symptoms which may be of concern.       Medical History:  She  has a past medical history of Abnormal gait, Cervical radiculopathy, Hypertension, Lumbar radiculopathy, Multiple sclerosis (H), Osteopenia, Other chronic pain, Pure hypercholesterolemia, and Renal disease.     She  has a past surgical history that includes Optical tracking system fusion sacral iliac (Left, 3/18/2019).    Family History  Her family history is not on file.     Social History:  She  reports that she has never smoked. She has never used smokeless tobacco.        Current Medications:   She has a current medication list which includes the following prescription(s): acetaminophen, amlodipine, hydroxyzine, rosuvastatin, senna-docusate sodium, tizanidine, cholecalciferol, and oxycodone, and the following Facility-Administered Medications: lidocaine (pf) and triamcinolone.     Allergies:    -- Ibuprofen     --  Other reaction(s): GI Upset    PHYSICAL EXAMINATION:  /84   Pulse 69   Resp 16   Ht 1.676 m (5' 6\")   Wt 68.5 kg (151 lb)   SpO2 100%   BMI 24.37 kg/m    General: NAD, alert and attentive.  Pleasant.  Cane resting on chair  CV: Distal pulses palpable, symmetric  Pulm: Breathing comfortably on room air  Skin: Dark ecchymoses on right ischial tuberosity covering an area of 4in x 6in  MSK/Neuro: Strength 5/5 in all lower extremity myotomes on right, 4/5 in hip flexion on L.DTR 2+ , symmetric.  No clonus.  Sensation to light touch intact throughout lower extremities, symmetric.     Limited bedside ultrasound of right posterior hip without soco fluid fluid collection however there is evidence of hematoma extending down approximately 3 cm.       ASSESSMENT:  Marichuy Navarrete is a pleasant 56 " year old female who presents with right leg pain worsened over the past few days accompanied by new paresthesias into the top of her foot and hematoma of her posterior hip.    There are no gross objective findings for radiculopathy on exam, however, she does have quite significant pain and associated allodynia over the area of hematoma/bruising.  Because of this worsening in symptoms and the hematoma, do not recommend proceeding with injection today.  It is unclear what is the source of her bleeding given she reports no trauma to the area and is not on any anticoagulation.  As such it is reasonable pursue further imaging to attempt to elucidate the source of her bleeding, which will also offer further insight to other sources of her pain. Following further imaging and resolution of hematoma, may consider piriformis injection if her symptoms symptoms persist.    PLAN:  -MRI pelvis [facility] [place] per patient preference.   -Continue to monitor symptoms as bruising resolves  -Will establish further plan following results of imaging studies.    Ready to learn, no apparent learning barriers.  Education provided on treatment plan according to patient's preferred learning style.  Patient verbalizes understanding.     Patient was seen and discussed with Dr. Borja who agrees with the assessment and plan.    __________________________________  Jan Goldsmith MD  Physical Medicine & Rehabilitation, PGY2    I was physically present for the E/M service provided. I agree with the House Officer note and plan, which I have reviewed and edited where appropriate. Unclear right gluteal hematoma with new onset pain and subjective paraesthesias affecting the RLE. MRI Pelvis to further evaluate involvement. Once symptoms improve/resolve and after review of imagine may plan to schedule piriformis muscle injection as per referral.   ________________________________   Juan Manuel Borja MD  Department of Physical Medicine & Rehabilitation

## 2021-07-23 ENCOUNTER — TRANSFERRED RECORDS (OUTPATIENT)
Dept: HEALTH INFORMATION MANAGEMENT | Facility: CLINIC | Age: 56
End: 2021-07-23

## 2021-07-28 ENCOUNTER — ANCILLARY PROCEDURE (OUTPATIENT)
Dept: GENERAL RADIOLOGY | Facility: CLINIC | Age: 56
End: 2021-07-28
Attending: ORTHOPAEDIC SURGERY
Payer: COMMERCIAL

## 2021-07-28 ENCOUNTER — TELEPHONE (OUTPATIENT)
Dept: PHYSICAL MEDICINE AND REHAB | Facility: CLINIC | Age: 56
End: 2021-07-28

## 2021-07-28 ENCOUNTER — OFFICE VISIT (OUTPATIENT)
Dept: ORTHOPEDICS | Facility: CLINIC | Age: 56
End: 2021-07-28
Payer: COMMERCIAL

## 2021-07-28 DIAGNOSIS — Z98.1 HISTORY OF SURGICAL FUSION JOINT: ICD-10-CM

## 2021-07-28 DIAGNOSIS — M46.1 SACROILIITIS (H): Primary | ICD-10-CM

## 2021-07-28 DIAGNOSIS — S76.311S STRAIN OF RIGHT HAMSTRING MUSCLE, SEQUELA: ICD-10-CM

## 2021-07-28 DIAGNOSIS — M46.1 SACROILIITIS (H): ICD-10-CM

## 2021-07-28 DIAGNOSIS — G57.01 PIRIFORMIS SYNDROME, RIGHT: Primary | ICD-10-CM

## 2021-07-28 PROCEDURE — 72190 X-RAY EXAM OF PELVIS: CPT | Mod: GC | Performed by: RADIOLOGY

## 2021-07-28 PROCEDURE — 99213 OFFICE O/P EST LOW 20 MIN: CPT | Performed by: ORTHOPAEDIC SURGERY

## 2021-07-28 NOTE — TELEPHONE ENCOUNTER
[facility] Call Center    Phone Message    May a detailed message be left on voicemail: yes     Reason for Call: Other: Writer sees that first available appt, in person, with Dr. Borja is in early September. Pt wants an in person appt. Would you please call pt to help pt schedule asap. Thank you.    Action Taken: Message routed to:  [facility]    Travel Screening: Not Applicable

## 2021-07-28 NOTE — TELEPHONE ENCOUNTER
Informed the patient a message will be routed to MD to review MRI to determine how to proceed with scheduling.

## 2021-07-28 NOTE — NURSING NOTE
Reason For Visit:   Chief Complaint   Patient presents with     RECHECK     Follow up MRI and Dr. Borja. Discuss possible fusion on Right SI joint      Primary MD: Zamzam Oconnell  Ref. MD: Est     ?  No  Occupation Not working.  .  Date of injury: no  Type of injury: no.     Date of surgery: 3/18/2019  Type of surgery: Minimally invasive left sacroiliac joint fusion with image-guided technology     Smoker: No  Request smoking cessation information: No    There were no vitals taken for this visit.    Pain Assessment  Patient Currently in Pain: Yes  0-10 Pain Scale: 10  Primary Pain Location: Back  Pain Descriptors: Discomfort    Oswestry (MATHEW) Questionnaire    OSWESTRY DISABILITY INDEX 7/28/2021   Count 9   Sum 35   Oswestry Score (%) 77.78          Visual Analog Pain Scale  Back Pain Scale 0-10: 10  Right leg pain: 10  Left leg pain: 8    Promis 10 Assessment    PROMIS 10 7/28/2021   In general, would you say your health is: Fair   In general, would you say your quality of life is: Fair   In general, how would you rate your physical health? Fair   In general, how would you rate your mental health, including your mood and your ability to think? Good   In general, how would you rate your satisfaction with your social activities and relationships? Fair   In general, please rate how well you carry out your usual social activities and roles Fair   To what extent are you able to carry out your everyday physical activities such as walking, climbing stairs, carrying groceries, or moving a chair? A little   How often have you been bothered by emotional problems such as feeling anxious, depressed or irritable? Sometimes   How would you rate your fatigue on average? Mild   How would you rate your pain on average?   0 = No Pain  to  10 = Worst Imaginable Pain 10   In general, would you say your health is: 2   In general, would you say your quality of life is: 2   In general, how would you rate your physical health?  2   In general, how would you rate your mental health, including your mood and your ability to think? 3   In general, how would you rate your satisfaction with your social activities and relationships? 2   In general, please rate how well you carry out your usual social activities and roles. (This includes activities at home, at work and in your community, and responsibilities as a parent, child, spouse, employee, friend, etc.) 2   To what extent are you able to carry out your everyday physical activities such as walking, climbing stairs, carrying groceries, or moving a chair? 2   In the past 7 days, how often have you been bothered by emotional problems such as feeling anxious, depressed, or irritable? 3   In the past 7 days, how would you rate your fatigue on average? 2   In the past 7 days, how would you rate your pain on average, where 0 means no pain, and 10 means worst imaginable pain? 10   Global Mental Health Score 10   Global Physical Health Score 9   PROMIS TOTAL - SUBSCORES 19                Yasmeen Campoverde LPN

## 2021-07-28 NOTE — LETTER
7/28/2021         RE: Marichuy Navarrete  [address]  [place] [address]        Dear Colleague,    Thank you for referring your patient, Marichuy Navarrete, to the [facility] [place]. Please see a copy of my visit note below.    Followup evaluation on Marichuy Navarrete.  She was originally scheduled to see me today to talk about her right SI joint fusion.  She had the left side done in 2019 with good results.  She had seen my colleague, Dr. Juan Manuel Borja, from Physical Medicine and they noted on ultrasound she had a significant hematoma around her right ischial tuberosity.  He ordered an MRI, which was performed at [facility] and this shows a moderate tendinopathy and moderate ill-defined partial tearing of the right semimembranosus tendon at the right ischial tuberosity attachment, which was new and this would explain her hematoma and her exquisite tenderness in that area.    I reviewed the imaging with my sports medicine colleague, Dr. Ramsey Grimes and we reviewed the images together.  He does not think that this is the kind of tear that would be repaired as there is not obvious retraction of the tendon but that this should be managed nonoperatively.    I think that nonoperative management can be quite excellently handled by Dr. Borja and we will have the patient follow up with him for management of this hamstring tendon origin tear.  Once she has recovered from this fully, then I would be happy to talk to her about her sacroiliac joint and move forward with that as needed.          Jason Matos MD

## 2021-07-28 NOTE — PROGRESS NOTES
Followup evaluation on Marichuy Navarrete.  She was originally scheduled to see me today to talk about her right SI joint fusion.  She had the left side done in 2019 with good results.  She had seen my colleague, Dr. Juan Manuel Borja, from Physical Medicine and they noted on ultrasound she had a significant hematoma around her right ischial tuberosity.  He ordered an MRI, which was performed at [facility] and this shows a moderate tendinopathy and moderate ill-defined partial tearing of the right semimembranosus tendon at the right ischial tuberosity attachment, which was new and this would explain her hematoma and her exquisite tenderness in that area.    I reviewed the imaging with my sports medicine colleague, Dr. Ramsey Grimes and we reviewed the images together.  He does not think that this is the kind of tear that would be repaired as there is not obvious retraction of the tendon but that this should be managed nonoperatively.    I think that nonoperative management can be quite excellently handled by Dr. Borja and we will have the patient follow up with him for management of this hamstring tendon origin tear.  Once she has recovered from this fully, then I would be happy to talk to her about her sacroiliac joint and move forward with that as needed.

## 2021-07-30 ENCOUNTER — TELEPHONE (OUTPATIENT)
Dept: PHYSICAL MEDICINE AND REHAB | Facility: CLINIC | Age: 56
End: 2021-07-30

## 2021-07-30 NOTE — TELEPHONE ENCOUNTER
[facility] Call Center    Phone Message    May a detailed message be left on voicemail: yes     Reason for Call: Other: Writer sees that first available appt, in person, with Dr. Borja is in early September. Pt wants an in person appt. Would you please call pt to help pt schedule asap. This is the pt's second time calling. Thank you.     Action Taken: Message routed to:  [facility] PM&R    Travel Screening: Not Applicable

## 2021-07-30 NOTE — TELEPHONE ENCOUNTER
See telephone encounter from 7/28/21. Routed to provider to see if we can work patient in. Sent to provider again.     Heber Brian RN, BSN, PHN

## 2021-07-30 NOTE — TELEPHONE ENCOUNTER
Niki Hughes    7/30/21 10:40 AM  Note     [facility] Call Center     Phone Message     May a detailed message be left on voicemail: yes      Reason for Call: Other: Writer sees that first available appt, in person, with Dr. Borja is in early September. Pt wants an in person appt. Would you please call pt to help pt schedule asap. This is the pt's second time calling. Thank you.      Action Taken: Message routed to:  [facility] PM&R     Travel Screening: Not Applicable

## 2021-08-02 NOTE — TELEPHONE ENCOUNTER
Can we schedule her for RV on a Monday when Dr. Goldsmith is working with me? Would be good for continuity. Jan, she has a HS tear where she had the bruise and hematoma. We could maybe double book at 1pm Monday? I can see the other patient and she can have Rv/follow-up     Spoke with patient and she does not have her vehicle today. Scheduled patient for first available and also placed her on the wait list high priority.       Heber Brian RN, BSN, PHN

## 2021-08-18 ENCOUNTER — OFFICE VISIT (OUTPATIENT)
Dept: PHYSICAL MEDICINE AND REHAB | Facility: CLINIC | Age: 56
End: 2021-08-18
Payer: COMMERCIAL

## 2021-08-18 VITALS — HEART RATE: 69 BPM | OXYGEN SATURATION: 98 % | DIASTOLIC BLOOD PRESSURE: 72 MMHG | SYSTOLIC BLOOD PRESSURE: 109 MMHG

## 2021-08-18 DIAGNOSIS — S76.311A PARTIAL TEAR OF RIGHT HAMSTRING: Primary | ICD-10-CM

## 2021-08-18 PROCEDURE — 99214 OFFICE O/P EST MOD 30 MIN: CPT | Performed by: PHYSICAL MEDICINE & REHABILITATION

## 2021-08-18 RX ORDER — CYCLOBENZAPRINE HCL 5 MG
5-10 TABLET ORAL
Qty: 60 TABLET | Refills: 1 | Status: SHIPPED | OUTPATIENT
Start: 2021-08-18 | End: 2022-01-06

## 2021-08-18 RX ORDER — CYCLOBENZAPRINE HCL 5 MG
5-10 TABLET ORAL
Qty: 60 TABLET | Refills: 1 | Status: SHIPPED | OUTPATIENT
Start: 2021-08-18 | End: 2021-08-18

## 2021-08-18 NOTE — PROGRESS NOTES
PM&R Follow-Up Visit -     Date of Initial Visit: 07/19/2021  LOV: 07/19/2021  TD: 8/18/2021     Recall: Marichuy Navarrete is a 56 year old female with history of left SI joint fusion (2019) who follows up for right gluteal/piriformis pain and acute right proximal hamstring tear.     INTERVAL HISTORY:  Patient was last seen in clinic July 19, 2021. At that visit, the plan was to inject/treat the piriformis muscle on the right. However, on evaluation she noted right gluteal pain near her sits bone with some bruising.  She was noted to have significant hematoma that was also confirmed on bedside ultrasound.  She was referred for MRI pelvis which did show Moderate tendinopathy and moderate grade ill-defined partial tearing of the right semimembranosus tendon at the right ischial tuberosity attachment.    Since last time, pain has remained approximately the same.  She localizes pain symptoms primarily to the right ischial tuberosity with radiating symptoms to the posterior thigh to the level of the knee.  She also reports gluteal/buttock pain around the piriformis muscle. She continues to ambulate with a cane. She has stopped doing piriformis exercises and stretches due to pain. PS 7/10 today.     She has followed up with Dr. Matos (07/28/2021) since our last visit who did review imaging and discussed possible surgical intervention with an orthopedic colleague who recommended continued non-operative strategies.     PRIOR INJURIES/TREATMENT:   Ice/Heat: none  Brace: none  Physical Therapy:   2021: piriformis rehab w/ Buddy Hilario    Prior Procedures: JOEL and piriformis injections ([facility])    Prior Related Surgery: Left SI joint fusion (2019)  Other (acupuncture, OMT, CMM, TENS, DME, etc.): None    Specialists Seen - (with most recent, available notes and clinic visits reviewed)   1. Orthopedic spine surgery - Dr. Jason Matos     IMAGING - reviewed   07/26/2021 MRI pelvis ([facility]) findings:  The hip joints appear predominantly  intact without evidence of full-thickness chondral loss, subchondral bone marrow edema/cystic change or joint effusion although it must be noted that this large field-of-view study of the pelvis does not optimally evaluate the hip labral and cartilage.    Pelvis osseous structures:  Sacrum: No fracture or destructive osseous lesion is seen of the imaged portions of the sacrum.  Sacroiliac joints: Surgical changes status post left sacroiliac joint fusion are again noted and did not appear significantly changed compared to previous MRI 01/19/2021 although evaluation of adjacent structures discomfort compromised by metallic hardware artifact.  Pubic rami: Remarkable unremarkable.  Symphysis pubis: There is no evidence of acute osteitis pubis    Proximal femurs: No fracture or osseous stress injury, avascular necrosis, or suspicious bone marrow signal abnormality seen    Myotendinous structures:  Gluteus abductors: Gluteus minimus and medius tendons are unremarkable.  Rectus abdominis-abductor longus aponeurosis, abductors, and rectus abdominis are unremarkable.  Hamstrings: There is moderate tendinopathy and moderate.  Ill-defined partial tearing of the right semimembranosus tendon at the right ischial tuberosity attachment as seen on coronal series 2 images 16 and 17, new compared to previous MRI.  Flexors: The iliopsoas and rectus femoris tendons are intact.  Quadratus femoris muscle: Unremarkable  Gluteal aponeurotic fascia and IT band: Unremarkable    There is slight left greater trochanteric bursitis.    Pelvic soft tissues: No pelvic mass or visibly enlarged lymphadenopathy.    Impression:  1.  Moderate tendinopathy and moderate grade ill-defined partial tearing of the right semimembranosus tendon at the right ischial tuberosity attachment.  2.  Surgical changes status post left sacroiliac joint fusion appear significantly changed from prior although must be noted that evaluation of the adjacent structures is  compromised by metallic hardware artifact.  3.  Slight left greater trochanteric bursitis, of uncertain calcification.  4.  No fracture or osseous stress injury of the pelvis        XR pelvis 5/20/21  Findings:     AP, outlet, and lateral  view(s) of the pelvis were obtained.      No acute osseous abnormality.     Postsurgical changes of left sacroiliac joint instrumentation without  evidence of hardware complication.     Loss of normal sphericity of bilateral femoral heads with relative  preservation of joint spaces.     Sacrum and innominate bones are partially obscured by overlying bowel  gas/fecal content.     Pelvic phlebolith.                                                                   Impression:  Postsurgical changes of left sacroiliac joint  instrumentation without evidence of hardware complication.    05/20/2021 MRI lumbar spine ([facility])  Interpretation:  Segment and alignment: Normal segmentation of the lumbar vertebral elements with a vestigial S1-2 disc which shows normal signal characteristics.  S1 is a nonmobile segment.  Sacrum and sacroiliac joints: Interim left SI joint fusion using a cage assisted minimally invasive technique is apparent.  Limited views of the right sacrum and sacroiliac joint are normal.  Conus/distal cord: Normal signal intensity within the conus medullaris and visualized lower spinal cord is demonstrated.  No intradural mass or arachnoidal adhesions are evident.  Osseous and paraspinous structures: There are no paraspinous soft tissue abnormalities.  L5-S1: Disc signal and contour are normal.  The canal and foramina are widely patent.  L4-L5: There is interim progression of disc degeneration with left paracentral annular fissure, and left foraminal and far lateral broad-based 4 mm protrusion.  There is mild to moderate foraminal stenosis without ganglionic impingement.  The canal remains patent.  L3-L4: Early disc degeneration is evident.  There is subtle  annular bulging with a broad-based 4 mm left foraminal and far lateral protrusion, increased in size since the previous exam.  There is crowding but no impingement of the exiting nerve root ganglion.  The right foramen and canal remain patent.  The L2-3, L1-2, and L T12-L1 levels are unremarkable.    Conclusion:  1.  Mild interval progression of degenerative changes now with 4 mm left foraminal and far lateral broad-based protrusions at L3-4 and L4-5.  No ganglionic impingement.  2.  There is a new annular fissure on the left at L4-5.  3.  No fracture or significant facet arthropathy.        Medical History:  She  has a past medical history of Abnormal gait, Cervical radiculopathy, Hypertension, Lumbar radiculopathy, Multiple sclerosis (H), Osteopenia, Other chronic pain, Pure hypercholesterolemia, and Renal disease.     She  has a past surgical history that includes Optical tracking system fusion sacral iliac (Left, 3/18/2019).    Family History  Her family history is not on file.     Social History:  She  reports that she has never smoked. She has never used smokeless tobacco.        Current Medications:   She has a current medication list which includes the following prescription(s): acetaminophen, amlodipine, hydroxyzine, rosuvastatin, senna-docusate sodium, tizanidine, cholecalciferol, and oxycodone, and the following Facility-Administered Medications: lidocaine (pf) and triamcinolone.     Allergies:    -- Ibuprofen     --  Other reaction(s): GI Upset    PHYSICAL EXAMINATION:  /72   Pulse 69   SpO2 98%    General: Pleasant, straightforward, WDWN individual.  Mental Status: Pleasant, direct, appropriate mood and affect  Resp: breathing is unlabored without audible wheeze  Vascular: Palpable pedal pulses, no cyanosis, no venous stasis changes  Heme: no visible ecchymosis or erythema on extremities  Skin: No notable rash    Neurologic:  Strength: All major muscle groups of the bilateral lower extremities  have normal and symmetric muscle strength EXCEPT HF and Hip extensor (HS) 4/5 with pain limits    Musculoskeletal:  Antalgic gait w/ sl compensated trendelenburg uses cane in right hand    Right hip/thigh: essentially full ROM with pain at end knee extension, tender over right proximal hamstring at ischial tuberosity, active knee flexion is painful and mildly weak.  Otherwise relatively full hip and knee rom full       ASSESSMENT:  Marichuy Navarrete is a pleasant 56 year old female who presents:    #. Partial tear of right hamstring. Moderate tendinopathy and moderate grade ill-defined partial tearing of the right semimembranosus tendon at the right ischial tuberosity attachment.  #. Piriformis syndrome, right  #. Left SI joint fusion (2019)    PLAN:  - Reviewed imaging  - Refer to PT for right HS  - Rx: flexeril 5-10mg at bedtime as discussed  - Acetaminophen 500 - 1,000mg (1-2 tablets) every 8 hours as needed for pain. Max acetaminophen 3,000mg per day (or 6 of the 500mg tablets).   - Consider US-guided proximal HS v piriformis muscle injection in the future. Would like to avoid additional corticosteroid in the short-term/acute period.   - RTC 6-8 weeks.     Ready to learn, no apparent learning barriers.  Education provided on treatment plan according to patient's preferred learning style.  Patient verbalizes understanding.     __________________________________  Juan Manuel Borja MD  Department of Physical Medicine & Rehabilitation

## 2021-08-18 NOTE — LETTER
8/18/2021       RE: Marichuy Navarrete  [address]  [place] [address]     Dear Colleague,    Thank you for referring your patient, Marichuy Navarrete, to the [facility] [place] at [facility]. Please see a copy of my visit note below.    PM&R Follow-Up Visit -     Date of Initial Visit: 07/19/2021  LOV: 07/19/2021  TD: 8/18/2021     Recall: Marichuy Navarrete is a 56 year old female with history of left SI joint fusion (2019) who follows up for right gluteal/piriformis pain and acute right proximal hamstring tear.     INTERVAL HISTORY:  Patient was last seen in clinic July 19, 2021. At that visit, the plan was to inject/treat the piriformis muscle on the right. However, on evaluation she noted right gluteal pain near her sits bone with some bruising.  She was noted to have significant hematoma that was also confirmed on bedside ultrasound.  She was referred for MRI pelvis which did show Moderate tendinopathy and moderate grade ill-defined partial tearing of the right semimembranosus tendon at the right ischial tuberosity attachment.    Since last time, pain has remained approximately the same.  She localizes pain symptoms primarily to the right ischial tuberosity with radiating symptoms to the posterior thigh to the level of the knee.  She also reports gluteal/buttock pain around the piriformis muscle. She continues to ambulate with a cane. She has stopped doing piriformis exercises and stretches due to pain. PS 7/10 today.     She has followed up with Dr. Matos (07/28/2021) since our last visit who did review imaging and discussed possible surgical intervention with an orthopedic colleague who recommended continued non-operative strategies.     PRIOR INJURIES/TREATMENT:   Ice/Heat: none  Brace: none  Physical Therapy:   2021: piriformis rehab [facility]/ Buddy Hilario    Prior Procedures: JOEL and piriformis injections  ([facility])    Prior Related Surgery: Left SI joint fusion (2019)  Other (acupuncture, OMT, CMM, TENS, DME, etc.): None    Specialists Seen - (with most recent, available notes and clinic visits reviewed)   1. Orthopedic spine surgery - Dr. Jason Matos     IMAGING - reviewed   07/26/2021 MRI pelvis ([facility]) findings:  The hip joints appear predominantly intact without evidence of full-thickness chondral loss, subchondral bone marrow edema/cystic change or joint effusion although it must be noted that this large field-of-view study of the pelvis does not optimally evaluate the hip labral and cartilage.    Pelvis osseous structures:  Sacrum: No fracture or destructive osseous lesion is seen of the imaged portions of the sacrum.  Sacroiliac joints: Surgical changes status post left sacroiliac joint fusion are again noted and did not appear significantly changed compared to previous MRI 01/19/2021 although evaluation of adjacent structures discomfort compromised by metallic hardware artifact.  Pubic rami: Remarkable unremarkable.  Symphysis pubis: There is no evidence of acute osteitis pubis    Proximal femurs: No fracture or osseous stress injury, avascular necrosis, or suspicious bone marrow signal abnormality seen    Myotendinous structures:  Gluteus abductors: Gluteus minimus and medius tendons are unremarkable.  Rectus abdominis-abductor longus aponeurosis, abductors, and rectus abdominis are unremarkable.  Hamstrings: There is moderate tendinopathy and moderate.  Ill-defined partial tearing of the right semimembranosus tendon at the right ischial tuberosity attachment as seen on coronal series 2 images 16 and 17, new compared to previous MRI.  Flexors: The iliopsoas and rectus femoris tendons are intact.  Quadratus femoris muscle: Unremarkable  Gluteal aponeurotic fascia and IT band: Unremarkable    There is slight left greater trochanteric bursitis.    Pelvic soft tissues: No pelvic mass or visibly enlarged  lymphadenopathy.    Impression:  1.  Moderate tendinopathy and moderate grade ill-defined partial tearing of the right semimembranosus tendon at the right ischial tuberosity attachment.  2.  Surgical changes status post left sacroiliac joint fusion appear significantly changed from prior although must be noted that evaluation of the adjacent structures is compromised by metallic hardware artifact.  3.  Slight left greater trochanteric bursitis, of uncertain calcification.  4.  No fracture or osseous stress injury of the pelvis        XR pelvis 5/20/21  Findings:     AP, outlet, and lateral  view(s) of the pelvis were obtained.      No acute osseous abnormality.     Postsurgical changes of left sacroiliac joint instrumentation without  evidence of hardware complication.     Loss of normal sphericity of bilateral femoral heads with relative  preservation of joint spaces.     Sacrum and innominate bones are partially obscured by overlying bowel  gas/fecal content.     Pelvic phlebolith.                                                                   Impression:  Postsurgical changes of left sacroiliac joint  instrumentation without evidence of hardware complication.    05/20/2021 MRI lumbar spine ([facility])  Interpretation:  Segment and alignment: Normal segmentation of the lumbar vertebral elements with a vestigial S1-2 disc which shows normal signal characteristics.  S1 is a nonmobile segment.  Sacrum and sacroiliac joints: Interim left SI joint fusion using a cage assisted minimally invasive technique is apparent.  Limited views of the right sacrum and sacroiliac joint are normal.  Conus/distal cord: Normal signal intensity within the conus medullaris and visualized lower spinal cord is demonstrated.  No intradural mass or arachnoidal adhesions are evident.  Osseous and paraspinous structures: There are no paraspinous soft tissue abnormalities.  L5-S1: Disc signal and contour are normal.  The canal and  foramina are widely patent.  L4-L5: There is interim progression of disc degeneration with left paracentral annular fissure, and left foraminal and far lateral broad-based 4 mm protrusion.  There is mild to moderate foraminal stenosis without ganglionic impingement.  The canal remains patent.  L3-L4: Early disc degeneration is evident.  There is subtle annular bulging with a broad-based 4 mm left foraminal and far lateral protrusion, increased in size since the previous exam.  There is crowding but no impingement of the exiting nerve root ganglion.  The right foramen and canal remain patent.  The L2-3, L1-2, and L T12-L1 levels are unremarkable.    Conclusion:  1.  Mild interval progression of degenerative changes now with 4 mm left foraminal and far lateral broad-based protrusions at L3-4 and L4-5.  No ganglionic impingement.  2.  There is a new annular fissure on the left at L4-5.  3.  No fracture or significant facet arthropathy.        Medical History:  She  has a past medical history of Abnormal gait, Cervical radiculopathy, Hypertension, Lumbar radiculopathy, Multiple sclerosis (H), Osteopenia, Other chronic pain, Pure hypercholesterolemia, and Renal disease.     She  has a past surgical history that includes Optical tracking system fusion sacral iliac (Left, 3/18/2019).    Family History  Her family history is not on file.     Social History:  She  reports that she has never smoked. She has never used smokeless tobacco.        Current Medications:   She has a current medication list which includes the following prescription(s): acetaminophen, amlodipine, hydroxyzine, rosuvastatin, senna-docusate sodium, tizanidine, cholecalciferol, and oxycodone, and the following Facility-Administered Medications: lidocaine (pf) and triamcinolone.     Allergies:    -- Ibuprofen     --  Other reaction(s): GI Upset    PHYSICAL EXAMINATION:  /72   Pulse 69   SpO2 98%    General: Pleasant, straightforward, WDWN  individual.  Mental Status: Pleasant, direct, appropriate mood and affect  Resp: breathing is unlabored without audible wheeze  Vascular: Palpable pedal pulses, no cyanosis, no venous stasis changes  Heme: no visible ecchymosis or erythema on extremities  Skin: No notable rash    Neurologic:  Strength: All major muscle groups of the bilateral lower extremities have normal and symmetric muscle strength EXCEPT HF and Hip extensor (HS) 4/5 with pain limits    Musculoskeletal:  Antalgic gait w/ sl compensated trendelenburg uses cane in right hand    Right hip/thigh: essentially full ROM with pain at end knee extension, tender over right proximal hamstring at ischial tuberosity, active knee flexion is painful and mildly weak.  Otherwise relatively full hip and knee rom full       ASSESSMENT:  Marichuy Navarrete is a pleasant 56 year old female who presents:    #. Partial tear of right hamstring. Moderate tendinopathy and moderate grade ill-defined partial tearing of the right semimembranosus tendon at the right ischial tuberosity attachment.  #. Piriformis syndrome, right  #. Left SI joint fusion (2019)    PLAN:  - Reviewed imaging  - Refer to PT for right HS  - Rx: flexeril 5-10mg at bedtime as discussed  - Acetaminophen 500 - 1,000mg (1-2 tablets) every 8 hours as needed for pain. Max acetaminophen 3,000mg per day (or 6 of the 500mg tablets).   - Consider US-guided proximal HS v piriformis muscle injection in the future. Would like to avoid additional corticosteroid in the short-term/acute period.   - RTC 6-8 weeks.     Ready to learn, no apparent learning barriers.  Education provided on treatment plan according to patient's preferred learning style.  Patient verbalizes understanding.     __________________________________  Juan Manuel Borja MD  Department of Physical Medicine & Rehabilitation        Again, thank you for allowing me to participate in the care of your patient.      Sincerely,    Juan Manuel Borja MD

## 2021-09-13 ENCOUNTER — TELEPHONE (OUTPATIENT)
Dept: PHYSICAL MEDICINE AND REHAB | Facility: CLINIC | Age: 56
End: 2021-09-13

## 2021-09-13 NOTE — TELEPHONE ENCOUNTER
[facility]    Phone Message    May a detailed message be left on voicemail: yes     Reason for Call: Other: Dr. Borja referred Marichuy to physical therapy. She has an appt this Thursday 9/16/21 but does not think she can do it because her back is so sore. She described bruising, swelling, pain with every step she takes, and difficulty tolerating sitting down. She is afraid going to PT will make things worse and is wondering if surgery might be a better route to go at this point. She would like a call back to advise on next steps to take.       Action Taken: Message routed to:  [facility] ([facility] neurology    Travel Screening: Not Applicable

## 2021-09-14 NOTE — TELEPHONE ENCOUNTER
Dr. Matos noted that he spoke with his surgical colleague who did not recommend surgery. Recommend trial of PT and let they will hopefully be able to incorporate/start w/ exercises she is able to tolerate.     Message text      Notified patient of message and we discussed that if something hurts badly in PT, let the therapist know and they can modify exercises for patient depending on her pain each session. Patient thanked me for the call and said she will give PT a try.         Heber Brian RN, BSN, PHN

## 2021-09-16 ENCOUNTER — THERAPY VISIT (OUTPATIENT)
Dept: PHYSICAL THERAPY | Facility: CLINIC | Age: 56
End: 2021-09-16
Attending: PHYSICAL MEDICINE & REHABILITATION
Payer: COMMERCIAL

## 2021-09-16 DIAGNOSIS — S76.311A PARTIAL TEAR OF RIGHT HAMSTRING: ICD-10-CM

## 2021-09-16 PROBLEM — M70.70 BURSITIS OF HIP: Status: RESOLVED | Noted: 2021-02-11 | Resolved: 2021-09-16

## 2021-09-16 PROBLEM — G57.01 PIRIFORMIS SYNDROME OF RIGHT SIDE: Status: RESOLVED | Noted: 2021-02-11 | Resolved: 2021-09-16

## 2021-09-16 PROBLEM — M46.1 SACROILIITIS (H): Status: RESOLVED | Noted: 2019-03-12 | Resolved: 2021-09-16

## 2021-09-16 PROCEDURE — 97161 PT EVAL LOW COMPLEX 20 MIN: CPT | Mod: GP | Performed by: PHYSICAL THERAPIST

## 2021-09-16 PROCEDURE — 97110 THERAPEUTIC EXERCISES: CPT | Mod: GP | Performed by: PHYSICAL THERAPIST

## 2021-09-16 NOTE — PROGRESS NOTES
Physical Therapy Initial Evaluation  September 16, 2021     MD Instructions/Precautions/Restrictions: PT eval and treat.     Therapist Impression:   Findings consistent with R HS partial tear, with related impairments limiting her ability to sit, stand, walk, lie down/sleep, navigate stairs. Skilled PT services are necessary in order to reduce impairments and improve independent function.     Subjective:   HPI: Marichuy Navarrete is a 56 year old female with chief complaint of R hip/posterior thigh pain. She is a previous patient of mine having rehabbed following L SIJ fusion, and then for an episode on non-operative PT focused on R SIJ dysfunction. She saw Dr. Borja, who noted the following:   Patient was last seen in clinic July 19, 2021. At that visit, the plan was to inject/treat the piriformis muscle on the right. However, on evaluation she noted right gluteal pain near her sits bone with some bruising.  She was noted to have significant hematoma that was also confirmed on bedside ultrasound.  She was referred for MRI pelvis which did show Moderate tendinopathy and moderate grade ill-defined partial tearing of the right semimembranosus tendon at the right ischial tuberosity attachment.    At her PT evaluation today, she reports it bothers her to sit, stand, walk, lie down/sleep, navigate stairs. Has substantial bruising through the region of her sit-bone.     Pertinent medical/surgical history: Refer to health history in EMR.   Imaging: MRI.   Prior treatment? PT   Current occupational status: Not working.   Patient's goals are: decrease pain, walk with less discomfort.   Return to MD:  PRN.       Objective:  HIP EXAMINATION    Gait: Ambulates with shortened stride bilaterally, decreased stance time on R, antalgic pattern. Ambulating w/o use of AD.  Significant ecchymosis present at ischial tuberosity on R and into distal 1/3 of glute and proximal 2 inches of R posterior thigh.     HIP RANGE OF MOTION &  STRENGTH  (*Indicates patient s pain)   PROM L PROM R MMT L MMT R   Hip Flx 120 100* 3 4-   Hip ER 90/90 WNL WNL     Hip IR 90/90 WNL WNL     Prone Bilateral IR       Hip ABD WNL WNL     Hip ADD   5 5   Hip Ext    na   Knee Ext WNL WNL  na   Knee Flx WNL WNL 4 At least 3 (see below)   Able to perform standing HS curl anti-gravity.     Hamstring length (90/90 knee extension): L side 45deg, R side 53deg    Assessment/Plan:    The patient is a 56 year old female with chief complaint of R glute/thigh pain.    The patient has the following significant findings with corresponding treatment plan.  Diagnosis 1:  R partial HS tear    Pain -  hot/cold therapy, US, electric stimulation, manual therapy, splint/taping/bracing/orthotics and self management  Decreased ROM/flexibility - manual therapy, therapeutic exercise and home program  Decreased joint mobility - manual therapy, therapeutic exercise and home program  Decreased strength - therapeutic exercise, therapeutic activities and home program  Impaired balance - neuro re-education, therapeutic activities and home program  Decreased proprioception - neuro re-education and therapeutic activities  Impaired gait - gait training and assistive devices  Impaired muscle performance - neuro re-education and home program  Decreased function - therapeutic activities and home program  Impaired posture - neuro re-education, therapeutic activities and home program  Instability -  Therapeutic Activity, Therapeutic Exercise, Neuromuscular Re-education, Splinting/Taping/Bracing/Orthotic, home program      Therapy Evaluation Codes:   1) History comprised of:   Personal factors that impact the plan of care:      Please refer to health history in EMR.    Comorbidity factors that impact the plan of care are:      Please refer to health history in EMR.     Medications impacting care: None.  2) Examination of Body Systems comprised of:   Body structures and functions that impact the plan of  care:      Hip.   Activity limitations that impact the plan of care are:      sit, stand, walk, lie down/sleep, navigate stairs.   Clinical presentation characteristics are:    Stable/Uncomplicated.  3) Presentation comprised of:   Presentation scored as Low complexity with uncomplicated characteristics..  4) Decision-Making    Low complexity using standardized patient assessment instrument and/or measureable assessment of functional outcome.  Cumulative Therapy Evaluation is: Low complexity.    Previous and current functional limitations:  (See Goal Flow Sheet for this information)    Short term and Long term goals: (See Goal Flow Sheet for this information)     Communication ability:  Patient appears to be able to clearly communicate and understand verbal and written communication and follow directions correctly.  Treatment Explanation - The following has been discussed with the patient: RX ordered/plan of care, anticipated outcomes, and possible risks and side effects.  This patient would benefit from PT intervention to resume normal activities.   Rehab potential is good.    Frequency:  1 X week, once daily  Duration:  for 4 weeks tapering to 2x/month for 2 months  Discharge Plan: Achieve all LTGs, be independent in home treatment program, and reach maximal therapeutic benefit.    Please refer to the daily flowsheet for treatment today, total treatment time and time spent performing 1:1 timed codes.

## 2021-09-17 NOTE — PROGRESS NOTES
Physical Therapy Initial Evaluation  Subjective:    Patient Health History         Pain is reported as 10/10 on pain scale.  General health as reported by patient is fair.  Pertinent medical history includes: high blood pressure and multiple sclerosis.        Surgeries include:  Orthopedic surgery (Sciatic Nerve).        Current occupation is None.                                       Objective:  System    Physical Exam    General     ROS    Assessment/Plan:

## 2021-09-23 ENCOUNTER — THERAPY VISIT (OUTPATIENT)
Dept: PHYSICAL THERAPY | Facility: CLINIC | Age: 56
End: 2021-09-23
Payer: COMMERCIAL

## 2021-09-23 DIAGNOSIS — S76.311A PARTIAL TEAR OF RIGHT HAMSTRING: ICD-10-CM

## 2021-09-23 PROCEDURE — 97112 NEUROMUSCULAR REEDUCATION: CPT | Mod: GP | Performed by: PHYSICAL THERAPIST

## 2021-09-23 PROCEDURE — 97140 MANUAL THERAPY 1/> REGIONS: CPT | Mod: GP | Performed by: PHYSICAL THERAPIST

## 2021-09-23 PROCEDURE — 97110 THERAPEUTIC EXERCISES: CPT | Mod: GP | Performed by: PHYSICAL THERAPIST

## 2021-09-30 ENCOUNTER — THERAPY VISIT (OUTPATIENT)
Dept: PHYSICAL THERAPY | Facility: CLINIC | Age: 56
End: 2021-09-30
Payer: COMMERCIAL

## 2021-09-30 DIAGNOSIS — S76.311A PARTIAL TEAR OF RIGHT HAMSTRING: ICD-10-CM

## 2021-09-30 PROCEDURE — 97140 MANUAL THERAPY 1/> REGIONS: CPT | Mod: GP

## 2021-09-30 PROCEDURE — 97110 THERAPEUTIC EXERCISES: CPT | Mod: GP

## 2021-10-07 ENCOUNTER — THERAPY VISIT (OUTPATIENT)
Dept: PHYSICAL THERAPY | Facility: CLINIC | Age: 56
End: 2021-10-07
Payer: COMMERCIAL

## 2021-10-07 DIAGNOSIS — S76.311A PARTIAL TEAR OF RIGHT HAMSTRING: ICD-10-CM

## 2021-10-07 PROCEDURE — 97110 THERAPEUTIC EXERCISES: CPT | Mod: GP | Performed by: PHYSICAL THERAPIST

## 2021-10-07 PROCEDURE — 97140 MANUAL THERAPY 1/> REGIONS: CPT | Mod: GP | Performed by: PHYSICAL THERAPIST

## 2021-10-07 PROCEDURE — 97530 THERAPEUTIC ACTIVITIES: CPT | Mod: GP | Performed by: PHYSICAL THERAPIST

## 2021-10-10 ENCOUNTER — HEALTH MAINTENANCE LETTER (OUTPATIENT)
Age: 56
End: 2021-10-10

## 2021-10-21 ENCOUNTER — THERAPY VISIT (OUTPATIENT)
Dept: PHYSICAL THERAPY | Facility: CLINIC | Age: 56
End: 2021-10-21
Payer: COMMERCIAL

## 2021-10-21 DIAGNOSIS — S76.311A PARTIAL TEAR OF RIGHT HAMSTRING: ICD-10-CM

## 2021-10-21 PROCEDURE — 97530 THERAPEUTIC ACTIVITIES: CPT | Mod: GP | Performed by: PHYSICAL THERAPIST

## 2021-10-21 PROCEDURE — 97110 THERAPEUTIC EXERCISES: CPT | Mod: GP | Performed by: PHYSICAL THERAPIST

## 2021-10-21 PROCEDURE — 97140 MANUAL THERAPY 1/> REGIONS: CPT | Mod: GP | Performed by: PHYSICAL THERAPIST

## 2022-01-03 ENCOUNTER — OFFICE VISIT (OUTPATIENT)
Dept: PHYSICAL MEDICINE AND REHAB | Facility: CLINIC | Age: 57
End: 2022-01-03
Payer: COMMERCIAL

## 2022-01-03 VITALS
OXYGEN SATURATION: 98 % | RESPIRATION RATE: 16 BRPM | HEART RATE: 70 BPM | DIASTOLIC BLOOD PRESSURE: 88 MMHG | SYSTOLIC BLOOD PRESSURE: 136 MMHG

## 2022-01-03 DIAGNOSIS — R29.2 HYPER REFLEXIA: ICD-10-CM

## 2022-01-03 DIAGNOSIS — M21.372 LEFT FOOT DROP: Primary | ICD-10-CM

## 2022-01-03 DIAGNOSIS — G35 MULTIPLE SCLEROSIS (H): ICD-10-CM

## 2022-01-03 DIAGNOSIS — M79.18 PIRIFORMIS MUSCLE PAIN: ICD-10-CM

## 2022-01-03 DIAGNOSIS — M43.28 FUSION OF SACRAL REGION OF SPINE: ICD-10-CM

## 2022-01-03 DIAGNOSIS — S76.311A PARTIAL TEAR OF RIGHT HAMSTRING: ICD-10-CM

## 2022-01-03 DIAGNOSIS — R25.2 SPASTICITY: ICD-10-CM

## 2022-01-03 PROCEDURE — 99215 OFFICE O/P EST HI 40 MIN: CPT | Performed by: PHYSICAL MEDICINE & REHABILITATION

## 2022-01-03 ASSESSMENT — PAIN SCALES - GENERAL: PAINLEVEL: MODERATE PAIN (5)

## 2022-01-03 NOTE — NURSING NOTE
Chief Complaint   Patient presents with     RECHECK     Review physical therapy results     Magen Dietrich

## 2022-01-03 NOTE — LETTER
1/3/2022       RE: Marichuy Navarrete  [address]  [place] [address]     Dear Colleague,    Thank you for referring your patient, Marichuy Navarrete, to the [facility]ICINE [facility] [place] at [facility]. Please see a copy of my visit note below.    PM&R Follow-Up Visit -     Date of Initial Visit: 07/19/2021  LOV: 8/18/2021   TD: 1/3/2022     Recall: Marichuy Navarrete is a 56 year old female with history of left SI joint fusion (2019) who follows up for right gluteal/piriformis pain and acute right proximal hamstring tear.     INTERVAL HISTORY:  Patient was last seen in clinic August 18, 2021.  She is accompanied today by her father.    At that visit, we had followed up regarding her right proximal hamstring tear and referred to physical therapy and provided with a prescription for Flexeril.  Since that time, her proximal hamstring tear/posterior thigh pain on the right had been doing better since course of physical therapy with Buddy Hilario.  Around October/November 2021 she had been seen by a chiropractor which aggravated some of her pain and symptoms on the left side with radiating symptoms to the posterior thigh.     She reports chronic weakness affecting the left lower extremity, foot drop and falls as a result of her toe catching.  She does use a straight cane typically which she does not have with her today in clinic.  She denies progressive weakness, numbness/tingling, bowel/bladder issue or saddle anesthesia.       PRIOR INJURIES/TREATMENT:   Ice/Heat: none  Brace: none  Physical Therapy:   2021: left piriformis rehab + prox hamstring tear on the right w/ Buddy Hilario    Prior Procedures: JOEL and piriformis injections ([facility])    Prior Related Surgery: Left SI joint fusion (2019)  Other (acupuncture, OMT, CMM, TENS, DME, etc.):   DME - straight cane  +CMM Oct/Nov 2021 w/ adjustments worsening pain and symptoms on the  left.     Specialists Seen - (with most recent, available notes and clinic visits reviewed)   1. Orthopedic spine surgery - Dr. Jason Matos     IMAGING - reviewed   07/26/2021 MRI pelvis ([facility]) findings:  The hip joints appear predominantly intact without evidence of full-thickness chondral loss, subchondral bone marrow edema/cystic change or joint effusion although it must be noted that this large field-of-view study of the pelvis does not optimally evaluate the hip labral and cartilage.    Pelvis osseous structures:  Sacrum: No fracture or destructive osseous lesion is seen of the imaged portions of the sacrum.  Sacroiliac joints: Surgical changes status post left sacroiliac joint fusion are again noted and did not appear significantly changed compared to previous MRI 01/19/2021 although evaluation of adjacent structures discomfort compromised by metallic hardware artifact.  Pubic rami: Remarkable unremarkable.  Symphysis pubis: There is no evidence of acute osteitis pubis    Proximal femurs: No fracture or osseous stress injury, avascular necrosis, or suspicious bone marrow signal abnormality seen    Myotendinous structures:  Gluteus abductors: Gluteus minimus and medius tendons are unremarkable.  Rectus abdominis-abductor longus aponeurosis, abductors, and rectus abdominis are unremarkable.  Hamstrings: There is moderate tendinopathy and moderate.  Ill-defined partial tearing of the right semimembranosus tendon at the right ischial tuberosity attachment as seen on coronal series 2 images 16 and 17, new compared to previous MRI.  Flexors: The iliopsoas and rectus femoris tendons are intact.  Quadratus femoris muscle: Unremarkable  Gluteal aponeurotic fascia and IT band: Unremarkable    There is slight left greater trochanteric bursitis.    Pelvic soft tissues: No pelvic mass or visibly enlarged lymphadenopathy.    Impression:  1.  Moderate tendinopathy and moderate grade ill-defined partial tearing of the right  semimembranosus tendon at the right ischial tuberosity attachment.  2.  Surgical changes status post left sacroiliac joint fusion appear significantly changed from prior although must be noted that evaluation of the adjacent structures is compromised by metallic hardware artifact.  3.  Slight left greater trochanteric bursitis, of uncertain calcification.  4.  No fracture or osseous stress injury of the pelvis      XR pelvis 5/20/21  Findings:    AP, outlet, and lateral  view(s) of the pelvis were obtained.      No acute osseous abnormality.     Postsurgical changes of left sacroiliac joint instrumentation without  evidence of hardware complication.     Loss of normal sphericity of bilateral femoral heads with relative  preservation of joint spaces.     Sacrum and innominate bones are partially obscured by overlying bowel  gas/fecal content.     Pelvic phlebolith.                                                                   Impression:  Postsurgical changes of left sacroiliac joint  instrumentation without evidence of hardware complication.    05/20/2021 MRI lumbar spine ([facility])  Interpretation:  Segment and alignment: Normal segmentation of the lumbar vertebral elements with a vestigial S1-2 disc which shows normal signal characteristics.  S1 is a nonmobile segment.  Sacrum and sacroiliac joints: Interim left SI joint fusion using a cage assisted minimally invasive technique is apparent.  Limited views of the right sacrum and sacroiliac joint are normal.  Conus/distal cord: Normal signal intensity within the conus medullaris and visualized lower spinal cord is demonstrated.  No intradural mass or arachnoidal adhesions are evident.  Osseous and paraspinous structures: There are no paraspinous soft tissue abnormalities.  L5-S1: Disc signal and contour are normal.  The canal and foramina are widely patent.  L4-L5: There is interim progression of disc degeneration with left paracentral annular fissure,  and left foraminal and far lateral broad-based 4 mm protrusion.  There is mild to moderate foraminal stenosis without ganglionic impingement.  The canal remains patent.  L3-L4: Early disc degeneration is evident.  There is subtle annular bulging with a broad-based 4 mm left foraminal and far lateral protrusion, increased in size since the previous exam.  There is crowding but no impingement of the exiting nerve root ganglion.  The right foramen and canal remain patent.  The L2-3, L1-2, and L T12-L1 levels are unremarkable.    Conclusion:  1.  Mild interval progression of degenerative changes now with 4 mm left foraminal and far lateral broad-based protrusions at L3-4 and L4-5.  No ganglionic impingement.  2.  There is a new annular fissure on the left at L4-5.  3.  No fracture or significant facet arthropathy.        Medical History:  She  has a past medical history of Abnormal gait, Cervical radiculopathy, Hypertension, Lumbar radiculopathy, Multiple sclerosis (H), Osteopenia, Other chronic pain, Pure hypercholesterolemia, and Renal disease.     She  has a past surgical history that includes Optical tracking system fusion sacral iliac (Left, 3/18/2019).      Family History  Her family history is not on file.     Social History:  She  reports that she has never smoked. She has never used smokeless tobacco.        Current Medications:   She has a current medication list which includes the following prescription(s): acetaminophen, amlodipine, cholecalciferol, cyclobenzaprine, hydroxyzine, rosuvastatin, senna-docusate sodium, and tizanidine, and the following Facility-Administered Medications: lidocaine (pf) and triamcinolone.       Allergies:    -- Ibuprofen     --  Other reaction(s): GI Upset    PHYSICAL EXAMINATION:  /88   Pulse 70   Resp 16   SpO2 98%    General: Pleasant, straightforward, WDWN individual.  Mental Status: Pleasant, direct, appropriate mood and affect  Resp: breathing is unlabored without  audible wheeze  Vascular: Palpable pedal pulses, no cyanosis, no venous stasis changes  Heme: no visible ecchymosis or erythema on extremities  Skin: No notable rash    Neurologic:  RLE 5/5   LLE 89324    SILT BLE subjectively    BUE reflexes 3+   +Parker's bilaterally    BLE 3+ bilaterally  Multiple beats Left ankle clonus    Spasticity LLE w/ KE 1 to 1+, DF to neutral w/ sustained clonus    Musculoskeletal:  Sl spastic, antalgic gait w/ foot drop    ASSESSMENT:  Marichuy Navarrete is a pleasant 56 year old female who presents:    #.  Left lower extremity weakness with foot drop and spasticity in the setting of previously diagnosed multiple sclerosis.  She states that she was diagnosed with MS in approximately 2004 but has not had long-term follow-up with any specialist regarding this.    Today, her primary complaint is with regards to her gluteal pain and posterior thigh pain and symptoms in the setting of partial tear of her right proximal hamstring.  However, on examination there is concern for hyperreflexia, foot drop, spasticity affecting her left lower limb.  She states that she has had chronic weakness and issues affecting her left lower limb for +15 years.  Although, in reviewing prior notes and medical records, while she did have pain in use of her cane, her clonus, tone and foot drop were not as apparent.  I am not sure if this is due to her prior diagnosis of MS or other neurologic issue.  She denies progressive weakness or symptoms in the last x1 to 2 months since her pain has been worse.      Complicating comorbidities:  #. Partial tear of right hamstring. Moderate tendinopathy and moderate grade ill-defined partial tearing of the right semimembranosus tendon at the right ischial tuberosity attachment.  #. Piriformis syndrome, right  #. Left SI joint fusion (2019)    PLAN:  -I have discussed this case with my colleague in physical medicine rehabilitation, Dr. Farnsworth, who was kind enough to further  evaluate her general rehabilitation needs including spasticity management, orthotics, and neuromuscular PT  -I will also plan to refer her to neurology for further evaluation with an MS specialist.   -I have asked her to discuss any further chiropractic manual manipulation or therapies with Dr. Jason Matos in the setting of her prior SI joint fusion and worsening symptoms after her last adjustment.  She plans to avoid additional CMM for now.   -I am happy to see her back in clinic in a coordination with Dr. Farnsworth in the future depending on her response and evaluation as noted above      Ready to learn, no apparent learning barriers.  Education provided on treatment plan according to patient's preferred learning style.  Patient verbalizes understanding.     __________________________________  Juan Manuel Borja MD  Department of Physical Medicine & Rehabilitation      60 minutes spent on the date of the encounter doing chart review, review of test results, patient visit, documentation and discussion with other provider(s)        Again, thank you for allowing me to participate in the care of your patient.      Sincerely,    Juan Manuel Borja MD

## 2022-01-03 NOTE — PROGRESS NOTES
PM&R Follow-Up Visit -     Date of Initial Visit: 07/19/2021  LOV: 8/18/2021   TD: 1/3/2022     Recall: Marichuy Navarrete is a 56 year old female with history of left SI joint fusion (2019) who follows up for right gluteal/piriformis pain and acute right proximal hamstring tear.     INTERVAL HISTORY:  Patient was last seen in clinic August 18, 2021.  She is accompanied today by her father.    At that visit, we had followed up regarding her right proximal hamstring tear and referred to physical therapy and provided with a prescription for Flexeril.  Since that time, her proximal hamstring tear/posterior thigh pain on the right had been doing better since course of physical therapy with Buddy Hilario.  Around October/November 2021 she had been seen by a chiropractor which aggravated some of her pain and symptoms on the left side with radiating symptoms to the posterior thigh.     She reports chronic weakness affecting the left lower extremity, foot drop and falls as a result of her toe catching.  She does use a straight cane typically which she does not have with her today in clinic.  She denies progressive weakness, numbness/tingling, bowel/bladder issue or saddle anesthesia.       PRIOR INJURIES/TREATMENT:   Ice/Heat: none  Brace: none  Physical Therapy:   2021: left piriformis rehab + prox hamstring tear on the right w/ Buddy Hilario    Prior Procedures: JOEL and piriformis injections ([facility])    Prior Related Surgery: Left SI joint fusion (2019)  Other (acupuncture, OMT, CMM, TENS, DME, etc.):   DME - straight cane  +CMM Oct/Nov 2021 w/ adjustments worsening pain and symptoms on the left.     Specialists Seen - (with most recent, available notes and clinic visits reviewed)   1. Orthopedic spine surgery - Dr. Jason Matos     IMAGING - reviewed   07/26/2021 MRI pelvis ([facility]) findings:  The hip joints appear predominantly intact without evidence of full-thickness chondral loss, subchondral bone marrow edema/cystic change or  joint effusion although it must be noted that this large field-of-view study of the pelvis does not optimally evaluate the hip labral and cartilage.    Pelvis osseous structures:  Sacrum: No fracture or destructive osseous lesion is seen of the imaged portions of the sacrum.  Sacroiliac joints: Surgical changes status post left sacroiliac joint fusion are again noted and did not appear significantly changed compared to previous MRI 01/19/2021 although evaluation of adjacent structures discomfort compromised by metallic hardware artifact.  Pubic rami: Remarkable unremarkable.  Symphysis pubis: There is no evidence of acute osteitis pubis    Proximal femurs: No fracture or osseous stress injury, avascular necrosis, or suspicious bone marrow signal abnormality seen    Myotendinous structures:  Gluteus abductors: Gluteus minimus and medius tendons are unremarkable.  Rectus abdominis-abductor longus aponeurosis, abductors, and rectus abdominis are unremarkable.  Hamstrings: There is moderate tendinopathy and moderate.  Ill-defined partial tearing of the right semimembranosus tendon at the right ischial tuberosity attachment as seen on coronal series 2 images 16 and 17, new compared to previous MRI.  Flexors: The iliopsoas and rectus femoris tendons are intact.  Quadratus femoris muscle: Unremarkable  Gluteal aponeurotic fascia and IT band: Unremarkable    There is slight left greater trochanteric bursitis.    Pelvic soft tissues: No pelvic mass or visibly enlarged lymphadenopathy.    Impression:  1.  Moderate tendinopathy and moderate grade ill-defined partial tearing of the right semimembranosus tendon at the right ischial tuberosity attachment.  2.  Surgical changes status post left sacroiliac joint fusion appear significantly changed from prior although must be noted that evaluation of the adjacent structures is compromised by metallic hardware artifact.  3.  Slight left greater trochanteric bursitis, of uncertain  calcification.  4.  No fracture or osseous stress injury of the pelvis      XR pelvis 5/20/21  Findings:    AP, outlet, and lateral  view(s) of the pelvis were obtained.      No acute osseous abnormality.     Postsurgical changes of left sacroiliac joint instrumentation without  evidence of hardware complication.     Loss of normal sphericity of bilateral femoral heads with relative  preservation of joint spaces.     Sacrum and innominate bones are partially obscured by overlying bowel  gas/fecal content.     Pelvic phlebolith.                                                                   Impression:  Postsurgical changes of left sacroiliac joint  instrumentation without evidence of hardware complication.    05/20/2021 MRI lumbar spine ([facility])  Interpretation:  Segment and alignment: Normal segmentation of the lumbar vertebral elements with a vestigial S1-2 disc which shows normal signal characteristics.  S1 is a nonmobile segment.  Sacrum and sacroiliac joints: Interim left SI joint fusion using a cage assisted minimally invasive technique is apparent.  Limited views of the right sacrum and sacroiliac joint are normal.  Conus/distal cord: Normal signal intensity within the conus medullaris and visualized lower spinal cord is demonstrated.  No intradural mass or arachnoidal adhesions are evident.  Osseous and paraspinous structures: There are no paraspinous soft tissue abnormalities.  L5-S1: Disc signal and contour are normal.  The canal and foramina are widely patent.  L4-L5: There is interim progression of disc degeneration with left paracentral annular fissure, and left foraminal and far lateral broad-based 4 mm protrusion.  There is mild to moderate foraminal stenosis without ganglionic impingement.  The canal remains patent.  L3-L4: Early disc degeneration is evident.  There is subtle annular bulging with a broad-based 4 mm left foraminal and far lateral protrusion, increased in size since the  previous exam.  There is crowding but no impingement of the exiting nerve root ganglion.  The right foramen and canal remain patent.  The L2-3, L1-2, and L T12-L1 levels are unremarkable.    Conclusion:  1.  Mild interval progression of degenerative changes now with 4 mm left foraminal and far lateral broad-based protrusions at L3-4 and L4-5.  No ganglionic impingement.  2.  There is a new annular fissure on the left at L4-5.  3.  No fracture or significant facet arthropathy.        Medical History:  She  has a past medical history of Abnormal gait, Cervical radiculopathy, Hypertension, Lumbar radiculopathy, Multiple sclerosis (H), Osteopenia, Other chronic pain, Pure hypercholesterolemia, and Renal disease.     She  has a past surgical history that includes Optical tracking system fusion sacral iliac (Left, 3/18/2019).      Family History  Her family history is not on file.     Social History:  She  reports that she has never smoked. She has never used smokeless tobacco.        Current Medications:   She has a current medication list which includes the following prescription(s): acetaminophen, amlodipine, cholecalciferol, cyclobenzaprine, hydroxyzine, rosuvastatin, senna-docusate sodium, and tizanidine, and the following Facility-Administered Medications: lidocaine (pf) and triamcinolone.       Allergies:    -- Ibuprofen     --  Other reaction(s): GI Upset    PHYSICAL EXAMINATION:  /88   Pulse 70   Resp 16   SpO2 98%    General: Pleasant, straightforward, WDWN individual.  Mental Status: Pleasant, direct, appropriate mood and affect  Resp: breathing is unlabored without audible wheeze  Vascular: Palpable pedal pulses, no cyanosis, no venous stasis changes  Heme: no visible ecchymosis or erythema on extremities  Skin: No notable rash    Neurologic:  RLE 5/5   LLE 79249    SILT BLE subjectively    BUE reflexes 3+   +Parker's bilaterally    BLE 3+ bilaterally  Multiple beats Left ankle clonus    Spasticity  LLE w/ KE 1 to 1+, DF to neutral w/ sustained clonus    Musculoskeletal:  Sl spastic, antalgic gait w/ foot drop    ASSESSMENT:  Marichuy Navarrete is a pleasant 56 year old female who presents:    #.  Left lower extremity weakness with foot drop and spasticity in the setting of previously diagnosed multiple sclerosis.  She states that she was diagnosed with MS in approximately 2004 but has not had long-term follow-up with any specialist regarding this.    Today, her primary complaint is with regards to her gluteal pain and posterior thigh pain and symptoms in the setting of partial tear of her right proximal hamstring.  However, on examination there is concern for hyperreflexia, foot drop, spasticity affecting her left lower limb.  She states that she has had chronic weakness and issues affecting her left lower limb for +15 years.  Although, in reviewing prior notes and medical records, while she did have pain in use of her cane, her clonus, tone and foot drop were not as apparent.  I am not sure if this is due to her prior diagnosis of MS or other neurologic issue.  She denies progressive weakness or symptoms in the last x1 to 2 months since her pain has been worse.      Complicating comorbidities:  #. Partial tear of right hamstring. Moderate tendinopathy and moderate grade ill-defined partial tearing of the right semimembranosus tendon at the right ischial tuberosity attachment.  #. Piriformis syndrome, right  #. Left SI joint fusion (2019)    PLAN:  -I have discussed this case with my colleague in physical medicine rehabilitation, Dr. Farnsworth, who was kind enough to further evaluate her general rehabilitation needs including spasticity management, orthotics, and neuromuscular PT  -I will also plan to refer her to neurology for further evaluation with an MS specialist.   -I have asked her to discuss any further chiropractic manual manipulation or therapies with Dr. Jason Matos in the setting of her prior SI joint  fusion and worsening symptoms after her last adjustment.  She plans to avoid additional CMM for now.   -I am happy to see her back in clinic in a coordination with Dr. Farnsworth in the future depending on her response and evaluation as noted above      Ready to learn, no apparent learning barriers.  Education provided on treatment plan according to patient's preferred learning style.  Patient verbalizes understanding.     __________________________________  Juan Manuel Borja MD  Department of Physical Medicine & Rehabilitation      60 minutes spent on the date of the encounter doing chart review, review of test results, patient visit, documentation and discussion with other provider(s)

## 2022-01-03 NOTE — Clinical Note
Crystal, can you please schedule a neurology visit w/ Dr. Arora in MS and NP visit in PM&R w/ Dr. Farnsworth? Thank you,

## 2022-01-05 NOTE — TELEPHONE ENCOUNTER
RECORDS RECEIVED FROM: Internal   REASON FOR VISIT: MS   Date of Appt: 3/8/22   NOTES (FOR ALL VISITS) STATUS DETAILS   OFFICE NOTE from referring provider Internal Dr Juan Manuel Borja @ [facility]:  1/3/22   OFFICE NOTE from other specialist Received Dr Matias Franklin @ [facility]:  7/27/15  6/3/14  5/12/14  (additional)    Shelia Lew NP @ [facility]:  9/23/14   DISCHARGE SUMMARY from hospital N/A    DISCHARGE REPORT from the ER N/A    OPERATIVE REPORT N/A    MEDICATION LIST Internal    IMAGING  (FOR ALL VISITS)     EMG N/A    EEG N/A    LUMBAR PUNCTURE N/A    HANANE SCAN N/A    ULTRASOUND (CAROTID BILAT) *VASCULAR* N/A    MRI (HEAD, NECK, SPINE) Received [facility]:  MRI Lumbar Spine 1/19/21    [facility]:  MRI Head 5/20/14  MRI Cervical Spine 5/20/14  MRI Lumbar Spine 11/20/13  MRI Thoracic Spine 8/14/13  MRI Cervical Spine 1/13/15  MRI Head 1/13/15   CT (HEAD, NECK, SPINE) N/A       Action 1/5/22 MV 12.40pm   Action Taken 1) Called and spoke with pt. She was diagnosed at [facility] and has imaging at [facility] and [facility].  2) imaging request faxed to [facility]  3) recs and imaging request faxed to [facility]    --1/21/22 MV 1.11pm--  1) recs received from [facility] and sent to scanning  2) images received from [facility] via mail and sent to scanning  3) [facility] resolved in PACS

## 2022-01-06 DIAGNOSIS — M79.18 PIRIFORMIS MUSCLE PAIN: ICD-10-CM

## 2022-01-06 DIAGNOSIS — R25.2 SPASTICITY: Primary | ICD-10-CM

## 2022-01-06 DIAGNOSIS — S76.311A PARTIAL TEAR OF RIGHT HAMSTRING: ICD-10-CM

## 2022-01-06 RX ORDER — CYCLOBENZAPRINE HCL 5 MG
5-10 TABLET ORAL
Qty: 60 TABLET | Refills: 1 | Status: SHIPPED | OUTPATIENT
Start: 2022-01-06

## 2022-01-06 NOTE — TELEPHONE ENCOUNTER
Disp Refills Start End   cyclobenzaprine (FLEXERIL) 5 MG tablet 60 tablet 1 8/18/2021          Pharmacy    [id number] - [place], [place] - [facility] [address] AT [facility] [address]     Associated Diagnoses    Partial tear of right hamstring [S76.311A]  - Primary

## 2022-02-07 NOTE — OR NURSING
Got up to bathroom    Became adiaphoric c/o feeling dizzy   Able to use crutches but unsteady at this time    Home instructions given to pt and family     Returned pt back to cart and allowing to rest . Will try again later to get up to get ready for discharge.   Sons with pt at bedside .    Crutch training done with pt and family       Please call  Yes, test in office in the afternoon schedule  She is high risk of complications and may benefit from treatment options if she does have covid-19

## 2022-03-07 ENCOUNTER — OFFICE VISIT (OUTPATIENT)
Dept: PHYSICAL MEDICINE AND REHAB | Facility: CLINIC | Age: 57
End: 2022-03-07
Payer: COMMERCIAL

## 2022-03-07 VITALS — HEART RATE: 72 BPM | SYSTOLIC BLOOD PRESSURE: 145 MMHG | OXYGEN SATURATION: 98 % | DIASTOLIC BLOOD PRESSURE: 91 MMHG

## 2022-03-07 DIAGNOSIS — M21.372 LEFT FOOT DROP: ICD-10-CM

## 2022-03-07 DIAGNOSIS — G35 MULTIPLE SCLEROSIS (H): Primary | ICD-10-CM

## 2022-03-07 DIAGNOSIS — R25.2 SPASTICITY: ICD-10-CM

## 2022-03-07 PROCEDURE — 99215 OFFICE O/P EST HI 40 MIN: CPT | Performed by: PHYSICAL MEDICINE & REHABILITATION

## 2022-03-07 RX ORDER — GABAPENTIN 100 MG/1
100 CAPSULE ORAL DAILY
COMMUNITY
Start: 2022-03-03 | End: 2022-12-29

## 2022-03-07 RX ORDER — DULOXETIN HYDROCHLORIDE 30 MG/1
1 CAPSULE, DELAYED RELEASE ORAL DAILY
COMMUNITY
Start: 2022-01-23 | End: 2022-12-29

## 2022-03-07 NOTE — LETTER
"3/7/2022       RE: Marichuy Navarrete  [address]  [place] [address]     Dear Colleague,    Thank you for referring your patient, Marichuy Navarrete, to the [facility] [place] [facility]. Please see a copy of my visit note below.           PM&R Clinic Note     Patient Name: Marichuy Navarrete : 1965 Medical Record: [id number]     Requesting Physician/clinician: Juan Manuel Borja MD           History of Present Illness:     Marichuy Navarrete is a 56 year old female with h/o multiple sclerosis who was referred to our clinic for management of spasticity.     She also has h/o left SI joint fusion (2019) and was referred to Dr. Boraj by Dr. Matos for the management of right gluteal/piriformis pain and acute right proximal hamstring tear. She was also referred to MS neurology team to establish care.     Symptoms,  --Pain at left knee, left buttock and poserior thigh area on the left   --weakness in LLE > LUE  --gest spasms in LLE but not often   --no paresthesia   --no issues with bowel or bladder function   --she has no issues with swallowing but recently has had some choking episodes with severe cough     Meds,  --Flexeril - took it soon after it was prescribed but stopped due to no benefits   --Tizanidine - takes 2-4 mg every other night - helps with spasms  - no trial of higher dose   --Gabapentin - new just prescribed - not taking it due to concerns for side effects   --Was on baclofen before but doesn't remember why it was discontinued    Therapies/HEP/equipments,  Worked with PT 2-3 months ago working on \"torn hamstring\" - finished in Nov  Was doing her HEP but not lately     Functionally,   Have not worked since  - stopped working due to multiple sclerosis   Has PCA (her son) who helps with getting in the tub, house chores, laundry and cooking   She is mod I with basic ADLs and mobility using a " SEC  Uses a 4ww for longer distances like walking in a park or grocery store  Drives occasionally   Left hand-dominant             Past Medical and Surgical History:     Past Medical History:   Diagnosis Date     Abnormal gait      Cervical radiculopathy      Hypertension      Lumbar radiculopathy      Multiple sclerosis (H)     LLE weakness     Osteopenia      Other chronic pain     SI joint pain, left sciatica     Pure hypercholesterolemia      Renal disease      Past Surgical History:   Procedure Laterality Date     OPTICAL TRACKING SYSTEM FUSION SACRAL ILIAC Left 3/18/2019    Procedure: STEALTH ASSISTED MINIMALLY INVASIVE SACRAL ILIAC JOINT FUSION LEFT;  Surgeon: Jason Matos MD;  Location:  OR            Social History:     Social History     Tobacco Use     Smoking status: Never Smoker     Smokeless tobacco: Never Used   Substance Use Topics     Alcohol use: Not on file       Lives in an apartment with her sons 21 and 24 yo. They have 6 stairs from the parking lot to enter the building and then 12 to the second floor where her bedroom is located            Functional history:     See HPI           Family History:     No family history on file.         Medications:     Current Outpatient Medications   Medication Sig Dispense Refill     acetaminophen (TYLENOL) 325 MG tablet Take 1-2 tablets (325-650 mg) by mouth every 6 hours as needed for pain 100 tablet 0     amLODIPine (NORVASC) 5 MG tablet Take 5 mg by mouth       cholecalciferol 1000 units TABS Take 1,000 Units by mouth        cyclobenzaprine (FLEXERIL) 5 MG tablet Take 1-2 tablets (5-10 mg) by mouth nightly as needed for muscle spasms 60 tablet 1     DULoxetine (CYMBALTA) 30 MG capsule Take 1 capsule by mouth daily       gabapentin (NEURONTIN) 100 MG capsule Take 100 mg by mouth daily       hydrOXYzine (ATARAX) 25 MG tablet Take 1-2 tabs up to 4 times daily as needed for anxiety, or just at bedtime. May make sleepy.       rosuvastatin (CRESTOR) 20  "MG tablet Take 20 mg by mouth       SENNA-docusate sodium (SENNA S) 8.6-50 MG tablet Take 1 tablet by mouth 2 times daily as needed (take to prevent constipation while taking narcotic pain medication) 30 tablet 0     tiZANidine (ZANAFLEX) 2 MG tablet Take 2-4 mg by mouth              Allergies:     Allergies   Allergen Reactions     Ibuprofen      Other reaction(s): GI Upset              ROS:     A focused ROS is negative other than the symptoms noted above in the HPI.           Physical Examiniation:     VITAL SIGNS: BP (!) 145/91   Pulse 72   SpO2 98%   BMI: Estimated body mass index is 25.18 kg/m  as calculated from the following:    Height as of 21: 1.676 m (5' 6\").    Weight as of 3/8/22: 70.8 kg (156 lb).    Gen: NAD, pleasant and cooperative   Cardio: regular pulse  Pulm: non-labored breathing in room air  Abd: benign  Ext: WWP, no edema in BLE, no tenderness in calves  Neuro/MSK:   RUE and RLE strength 5/5   LUE 4+/5  L HF 2/5, KF/KE 4/5, DF/EHL 1/5 and PF 4/5  Sustained clonus at L ankle   Increased tone at L KF and PF             Laboratory/Imagin2021 MRI lumbar spine ([facility])  Interpretation:  Segment and alignment: Normal segmentation of the lumbar vertebral elements with a vestigial S1-2 disc which shows normal signal characteristics.  S1 is a nonmobile segment.  Sacrum and sacroiliac joints: Interim left SI joint fusion using a cage assisted minimally invasive technique is apparent.  Limited views of the right sacrum and sacroiliac joint are normal.  Conus/distal cord: Normal signal intensity within the conus medullaris and visualized lower spinal cord is demonstrated.  No intradural mass or arachnoidal adhesions are evident.  Osseous and paraspinous structures: There are no paraspinous soft tissue abnormalities.  L5-S1: Disc signal and contour are normal.  The canal and foramina are widely patent.  L4-L5: There is interim progression of disc degeneration with left " paracentral annular fissure, and left foraminal and far lateral broad-based 4 mm protrusion.  There is mild to moderate foraminal stenosis without ganglionic impingement.  The canal remains patent.  L3-L4: Early disc degeneration is evident.  There is subtle annular bulging with a broad-based 4 mm left foraminal and far lateral protrusion, increased in size since the previous exam.  There is crowding but no impingement of the exiting nerve root ganglion.  The right foramen and canal remain patent.  The L2-3, L1-2, and L T12-L1 levels are unremarkable.           Assessment/Plan:       Primary progressive multiple sclerosis     Left spastic hemiparesis     Partial tear of right hamstring. Moderate tendinopathy and moderate grade ill-defined partial tearing of the right semimembranosus tendon at the right ischial tuberosity attachment.    Piriformis syndrome, right    Left SI joint fusion (2019)     1. Patient education: In depth discussion and education was provided about the assessment and implications of each of the below recommendations for management. Patient indicated readiness to learn, all questions were answered and understanding of material presented was confirmed.    2. Work-up: none today     3. Therapy/equipment/braces: new PT referral to work on foot drop, trial of AFO, work on gait and balance     4. Medications: recommended to take tizanidine scheduled 2mg tid with additional dose at night time     5. Interventions: will get PA and start Botox injections to help with spasticity in LLE    6. Referral / follow up with other providers: no new today     7. Follow up: in 2-3 weeks for the first round of Botox injections       Nora Farnsworth MD  Physical Medicine & Rehabilitation      55 minutes spent on the date of the encounter doing chart review, history and exam, documentation and further activities as noted above

## 2022-03-07 NOTE — PROGRESS NOTES
"       PM&R Clinic Note     Patient Name: Marichuy Navarrete : 1965 Medical Record: [id number]     Requesting Physician/clinician: Juan Manuel Borja MD           History of Present Illness:     Marichuy Navarrete is a 56 year old female with h/o multiple sclerosis who was referred to our clinic for management of spasticity.     She also has h/o left SI joint fusion (2019) and was referred to Dr. Borja by Dr. Matos for the management of right gluteal/piriformis pain and acute right proximal hamstring tear. She was also referred to MS neurology team to establish care.       Symptoms,  --Pain at left knee, left buttock and poserior thigh area on the left   --weakness in LLE > LUE  --gest spasms in LLE but not often   --no paresthesia   --no issues with bowel or bladder function   --she has no issues with swallowing but recently has had some choking episodes with severe cough     Meds,  --Flexeril - took it soon after it was prescribed but stopped due to no benefits   --Tizanidine - takes 2-4 mg every other night - helps with spasms  - no trial of higher dose   --Gabapentin - new just prescribed - not taking it due to concerns for side effects   --Was on baclofen before but doesn't remember why it was discontinued    Therapies/HEP/equipments,  Worked with PT 2-3 months ago working on \"torn hamstring\" - finished in Nov  Was doing her HEP but not lately     Functionally,   Have not worked since  - stopped working due to multiple sclerosis   Has PCA (her son) who helps with getting in the tub, house chores, laundry and cooking   She is mod I with basic ADLs and mobility using a SEC  Uses a 4ww for longer distances like walking in a park or grocery store  Drives occasionally   Left hand-dominant                 Past Medical and Surgical History:     Past Medical History:   Diagnosis Date     Abnormal gait      Cervical radiculopathy      Hypertension      Lumbar radiculopathy      Multiple sclerosis (H)     LLE " weakness     Osteopenia      Other chronic pain     SI joint pain, left sciatica     Pure hypercholesterolemia      Renal disease      Past Surgical History:   Procedure Laterality Date     OPTICAL TRACKING SYSTEM FUSION SACRAL ILIAC Left 3/18/2019    Procedure: STEALTH ASSISTED MINIMALLY INVASIVE SACRAL ILIAC JOINT FUSION LEFT;  Surgeon: Jason Matos MD;  Location: [facility] OR            Social History:     Social History     Tobacco Use     Smoking status: Never Smoker     Smokeless tobacco: Never Used   Substance Use Topics     Alcohol use: Not on file       Lives in an apartment with her sons 21 and 24 yo. They have 6 stairs from the parking lot to enter the building and then 12 to the second floor where her bedroom is located            Functional history:     See HPI           Family History:     No family history on file.         Medications:     Current Outpatient Medications   Medication Sig Dispense Refill     acetaminophen (TYLENOL) 325 MG tablet Take 1-2 tablets (325-650 mg) by mouth every 6 hours as needed for pain 100 tablet 0     amLODIPine (NORVASC) 5 MG tablet Take 5 mg by mouth       cholecalciferol 1000 units TABS Take 1,000 Units by mouth        cyclobenzaprine (FLEXERIL) 5 MG tablet Take 1-2 tablets (5-10 mg) by mouth nightly as needed for muscle spasms 60 tablet 1     DULoxetine (CYMBALTA) 30 MG capsule Take 1 capsule by mouth daily       gabapentin (NEURONTIN) 100 MG capsule Take 100 mg by mouth daily       hydrOXYzine (ATARAX) 25 MG tablet Take 1-2 tabs up to 4 times daily as needed for anxiety, or just at bedtime. May make sleepy.       rosuvastatin (CRESTOR) 20 MG tablet Take 20 mg by mouth       SENNA-docusate sodium (SENNA S) 8.6-50 MG tablet Take 1 tablet by mouth 2 times daily as needed (take to prevent constipation while taking narcotic pain medication) 30 tablet 0     tiZANidine (ZANAFLEX) 2 MG tablet Take 2-4 mg by mouth              Allergies:     Allergies   Allergen Reactions      "Ibuprofen      Other reaction(s): GI Upset              ROS:     A focused ROS is negative other than the symptoms noted above in the HPI.           Physical Examiniation:     VITAL SIGNS: BP (!) 145/91   Pulse 72   SpO2 98%   BMI: Estimated body mass index is 25.18 kg/m  as calculated from the following:    Height as of 21: 1.676 m (5' 6\").    Weight as of 3/8/22: 70.8 kg (156 lb).    Gen: NAD, pleasant and cooperative   Cardio: regular pulse  Pulm: non-labored breathing in room air  Abd: benign  Ext: WWP, no edema in BLE, no tenderness in calves  Neuro/MSK:   RUE and RLE strength 5/5   LUE 4+/5  L HF 2/5, KF/KE 4/5, DF/EHL 1/5 and PF 4/5  Sustained clonus at L ankle   Increased tone at L KF and PF             Laboratory/Imagin2021 MRI lumbar spine ([facility])  Interpretation:  Segment and alignment: Normal segmentation of the lumbar vertebral elements with a vestigial S1-2 disc which shows normal signal characteristics.  S1 is a nonmobile segment.  Sacrum and sacroiliac joints: Interim left SI joint fusion using a cage assisted minimally invasive technique is apparent.  Limited views of the right sacrum and sacroiliac joint are normal.  Conus/distal cord: Normal signal intensity within the conus medullaris and visualized lower spinal cord is demonstrated.  No intradural mass or arachnoidal adhesions are evident.  Osseous and paraspinous structures: There are no paraspinous soft tissue abnormalities.  L5-S1: Disc signal and contour are normal.  The canal and foramina are widely patent.  L4-L5: There is interim progression of disc degeneration with left paracentral annular fissure, and left foraminal and far lateral broad-based 4 mm protrusion.  There is mild to moderate foraminal stenosis without ganglionic impingement.  The canal remains patent.  L3-L4: Early disc degeneration is evident.  There is subtle annular bulging with a broad-based 4 mm left foraminal and far lateral protrusion, " increased in size since the previous exam.  There is crowding but no impingement of the exiting nerve root ganglion.  The right foramen and canal remain patent.  The L2-3, L1-2, and L T12-L1 levels are unremarkable.           Assessment/Plan:       Primary progressive multiple sclerosis     Left spastic hemiparesis     Partial tear of right hamstring. Moderate tendinopathy and moderate grade ill-defined partial tearing of the right semimembranosus tendon at the right ischial tuberosity attachment.    Piriformis syndrome, right    Left SI joint fusion (2019)         1. Patient education: In depth discussion and education was provided about the assessment and implications of each of the below recommendations for management. Patient indicated readiness to learn, all questions were answered and understanding of material presented was confirmed.    2. Work-up: none today     3. Therapy/equipment/braces: new PT referral to work on foot drop, trial of AFO, work on gait and balance     4. Medications: recommended to take tizanidine scheduled 2mg tid with additional dose at night time     5. Interventions: will get PA and start Botox injections to help with spasticity in LLE    6. Referral / follow up with other providers: no new today     7. Follow up: in 2-3 weeks for the first round of Botox injections     Nora Farnsworth MD  Physical Medicine & Rehabilitation    55 minutes spent on the date of the encounter doing chart review, history and exam, documentation and further activities as noted above

## 2022-03-08 ENCOUNTER — OFFICE VISIT (OUTPATIENT)
Dept: NEUROLOGY | Facility: CLINIC | Age: 57
End: 2022-03-08
Attending: PHYSICAL MEDICINE & REHABILITATION
Payer: COMMERCIAL

## 2022-03-08 ENCOUNTER — PRE VISIT (OUTPATIENT)
Dept: NEUROLOGY | Facility: CLINIC | Age: 57
End: 2022-03-08
Payer: COMMERCIAL

## 2022-03-08 VITALS
SYSTOLIC BLOOD PRESSURE: 140 MMHG | BODY MASS INDEX: 25.18 KG/M2 | HEART RATE: 80 BPM | OXYGEN SATURATION: 99 % | DIASTOLIC BLOOD PRESSURE: 88 MMHG | WEIGHT: 156 LBS

## 2022-03-08 DIAGNOSIS — G35 MS (MULTIPLE SCLEROSIS) (H): Primary | ICD-10-CM

## 2022-03-08 DIAGNOSIS — M62.838 MUSCLE SPASTICITY: ICD-10-CM

## 2022-03-08 DIAGNOSIS — Z91.81 RISK FOR FALLS: ICD-10-CM

## 2022-03-08 PROCEDURE — 99204 OFFICE O/P NEW MOD 45 MIN: CPT | Mod: GC | Performed by: PSYCHIATRY & NEUROLOGY

## 2022-03-08 PROCEDURE — G0463 HOSPITAL OUTPT CLINIC VISIT: HCPCS

## 2022-03-08 ASSESSMENT — PAIN SCALES - GENERAL: PAINLEVEL: NO PAIN (0)

## 2022-03-08 ASSESSMENT — PATIENT HEALTH QUESTIONNAIRE - PHQ9: SUM OF ALL RESPONSES TO PHQ QUESTIONS 1-9: 0

## 2022-03-08 NOTE — PROGRESS NOTES
Neurology Clinic Visit      3/8/2022    Chief Symptom: Multiple sclerosis    History of Present Symptom:  Marichuy Navarrete is a 56 year old left handed female with PMH significant for hypertension and hyperlipidemia referred to multiple sclerosis clinic by PMR clinic.      She first noticed left lower extremity weakness in 2004, when she was living in [place]. Had extensive unrevealing work-up done at that time. In 2014, following left lower extremity weakness and numbness in groin area,  Dr. Roderick Baer, her internal medicine doctor ordered MRI for her which revealed multiple T2 hyper intensities supra and infratentorial and C spine with multiple t2 hyperintensites from c1-c5. None of them were enhancing. She started following up with Dr. Franklin in [facility] and was started on interferon. She stopped taking interferon few months later as it felt that it was making her weakness worse. Ever since that she has not been on any DMT.    Since 2014, when the diagnosis was made she has not noticed any new neurologic deficit. She thinks her left upper extremity is also weaker than her right upper extremity but that has been unchanged throughout the years. The left lower extremity weakness is stable and unchanged. She denies difficulty with vision, speech and swallowing, numbness, dizziness and mental status changes. Denies urinary and bowel incontinence.     At home she can walk without assistance. For walking short distances she uses a cane and for long distances she uses walker.    The main bothersome to her is her lleg spasm. Her whole leg gets very tight and painful after walking. She takes tizanidine almost every day which has been somewhat helpful. She is also receiving Botox injections from PMR clinic.       The patient's medical, surgical, social, and family history were personally reviewed with the patient.  Past Medical History:   Diagnosis Date     Abnormal gait      Cervical radiculopathy      Hypertension       Lumbar radiculopathy      Multiple sclerosis (H)     LLE weakness     Osteopenia      Other chronic pain     SI joint pain, left sciatica     Pure hypercholesterolemia      Renal disease       Past Surgical History:   Procedure Laterality Date     OPTICAL TRACKING SYSTEM FUSION SACRAL ILIAC Left 3/18/2019    Procedure: STEALTH ASSISTED MINIMALLY INVASIVE SACRAL ILIAC JOINT FUSION LEFT;  Surgeon: Jason Matos MD;  Location: [facility] OR     Social History     Socioeconomic History     Marital status:      Spouse name: Not on file     Number of children: Not on file     Years of education: Not on file     Highest education level: Not on file   Occupational History     Not on file   Tobacco Use     Smoking status: Never Smoker     Smokeless tobacco: Never Used   Substance and Sexual Activity     Alcohol use: Not on file     Drug use: Not on file     Sexual activity: Not on file   Other Topics Concern     Not on file   Social History Narrative     Not on file     Social Determinants of Health     Financial Resource Strain: Not on file   Food Insecurity: Not on file   Transportation Needs: Not on file   Physical Activity: Not on file   Stress: Not on file   Social Connections: Not on file   Intimate Partner Violence: Not on file   Housing Stability: Not on file     No family history on file.  Current Outpatient Medications   Medication     acetaminophen (TYLENOL) 325 MG tablet     amLODIPine (NORVASC) 5 MG tablet     cholecalciferol 1000 units TABS     cyclobenzaprine (FLEXERIL) 5 MG tablet     DULoxetine (CYMBALTA) 30 MG capsule     gabapentin (NEURONTIN) 100 MG capsule     hydrOXYzine (ATARAX) 25 MG tablet     rosuvastatin (CRESTOR) 20 MG tablet     SENNA-docusate sodium (SENNA S) 8.6-50 MG tablet     tiZANidine (ZANAFLEX) 2 MG tablet     Current Facility-Administered Medications   Medication     lidocaine (PF) (XYLOCAINE) 1 % injection 6 mL     triamcinolone (KENALOG-40) injection 40 mg     Allergies    Allergen Reactions     Ibuprofen      Other reaction(s): GI Upset       Review of Systems:  14-point review of systems was completed and is in the scanned health questionnaire from this visit. The pertinent positives and negatives are in the HPI.    Physical Exam:  BP (!) 140/88 (BP Location: Left arm, Patient Position: Chair, Cuff Size: Adult Large)   Pulse 80   Wt 70.8 kg (156 lb)   SpO2 99%   BMI 25.18 kg/m    Mental status: Awake, alert, attentive, Language is fluent and coherent with intact comprehension of commands.   Cranial nerves: PERRL, conjugate gaze, EOMI, facial sensation intact, face symmetric, shoulder shrug strong, tongue/uvula midline, no dysarthria.   Motor: Has spasticity in the left lower extremity. No atrophy or fasciculations. 5/5 strength in the right upper and lower extremities. Left shoulder abduction adduction 5/5, left elbow flexion extension 5/5, left wrist extension, finger extension and APB's 4/5, left hip extension and flexion 3-4/5, left knee flexion extension 5/5, left foot dorsiflexion 4/5 ( limited ROM), plantarflexion 5/5  Sensory:  Sensation intact to light touch on arms and legs bilaterally. Negative Romberg.  Coordination:  Finger-nose-finger intact bilaterally. Finger tapping regular and rhythmic bilaterally.  Reflexes:   Brisk and symmetric in triceps, biceps, brachioradialis, patellar, Achilles  Gait: Able to walk without walker. Able to get up from chair with no assistance. Drags left foot when walking. Unable to walk on her heels. Has difficulty with tandem gait.      Investigations:    I personally reviewed all relevant labs and imaging in EMR.      Assessment/Plan:    Marichuy Navarrete is a 56 year old female with a PMH significant for hypertension and hyperlipidemia referred to multiple sclerosis clinic by PMR clinic. Given her story and clinical presentation most likely she has primary progressive multiple sclerosis. Fortunately this has been stable since 2014. Her  most recent brain and C-spine MRI in 2015 shows stable multiple T2 hyperintensities with no enhancing lesion. Given her age, type of MS and stability of symptoms she does not benefit from DMT. We will have her follow-up with us once a year for monitoring..    -C-spine and brain MRI in 1 year  -Follow-up in clinic in 1 year    I have seen and examined the patient with neurology attending Dr. Ramsey who agrees with my plan.     Araceli Duran MD  Neurology Resident PGY3     I saw and examined this patient, and have read and edited the resident's note.  I agree with the findings, assessment, and plan.  I spent 54 minutes on her care on the date of service, including chart review and face to face time.  Marichuy had insidious onset LLE pain and clumsiness in the mid-2000s.  She began following with Dr. Franklin in 2011 at [facility], was eventually diagnosed with MS on the basis of MRIs in 2014.  Brain MRI shows a moderate to severe burden of lesions typical for MS, C spine MRI shows focal cord lesions at C2, C3, C4-5.  Never had CSF.  No history of relapses.  She has PPMS, but reports stability for many years.  Spasticity being managed by PM&R.  She has pending PT at [facility], may benefit from AFO.  Plan as above.    Matias Ramsey MD

## 2022-03-24 ENCOUNTER — HOSPITAL ENCOUNTER (OUTPATIENT)
Dept: PHYSICAL THERAPY | Facility: CLINIC | Age: 57
Setting detail: THERAPIES SERIES
Discharge: HOME | End: 2022-03-24
Attending: PHYSICAL MEDICINE & REHABILITATION
Payer: COMMERCIAL

## 2022-03-24 DIAGNOSIS — M21.372 LEFT FOOT DROP: ICD-10-CM

## 2022-03-24 DIAGNOSIS — R25.2 SPASTICITY: ICD-10-CM

## 2022-03-24 DIAGNOSIS — G35 MULTIPLE SCLEROSIS (H): ICD-10-CM

## 2022-03-24 PROCEDURE — 97161 PT EVAL LOW COMPLEX 20 MIN: CPT | Mod: GP | Performed by: PHYSICAL THERAPIST

## 2022-03-24 PROCEDURE — 97110 THERAPEUTIC EXERCISES: CPT | Mod: GP | Performed by: PHYSICAL THERAPIST

## 2022-03-24 ASSESSMENT — 6 MINUTE WALK TEST (6MWT): TOTAL DISTANCE WALKED (FT): 645

## 2022-03-25 NOTE — PROGRESS NOTES
03/24/22 1400   Quick Adds   Type of Visit Initial OP PT Evaluation   General Information   Start of Care Date 03/24/22   Referring Physician Nora Farnsworth MD   Orders Evaluate and Treat as Indicated  (PT to work on L foot drop. Trial with AFO, gait, and balance)   Order Date 03/14/22   Medical Diagnosis Spasticity, Multiple Sclerosis, L Foot Drop   Onset of illness/injury or Date of Surgery 03/14/22  (Date of referral)   Surgical/Medical history reviewed Yes   Pertinent history of current problem (include personal factors and/or comorbidities that impact the POC) Pt is a 57 year old female with PMH significant for hyperlipidemia, hypertension, and multiple sclerosis presenting with c/o L foot drop and L LE weakness. Current medications to manage spasticity include tizanidine and new trial of botox (scheduled for 3/28). Pt reports: L foot drop started in 2006 and noticed that foot was dropping when walking, has been consistent without noticeable decline. Pt reports tripping most noticeably when coming up the stairs, though notes that L toes drag when walking. Gets fatigued by end of day resulting in reduced foot clearance and more noticeable L LE weakness   Pertinent Visual History  Glasses   Prior level of function comment Lives with her son who is her PCA who assists with IADLs. Independent with transfers and functional mobility.   Previous/Current Treatment Physical Therapy   Improvement after PT Moderate  (R hamstring tear. Established successful HEP.)   Current Community Support Personal care attendant;Transportation service  ([employer] taxi service. PCA (son) 21 hr/wk)   Patient role/Employment history Unemployed  (Previously worked part time in medical related assistance)   Living environment Apartment/condo  (6 steps to enter building, then 12 to get to apartment)   Home/Community Accessibility Comments Elevator in building however far walk so opts to take stairs instead.    Current Assistive Devices  Walker;Standard Cane  (Walker for long distances, Cane for shorter distances)   Patient/Family Goals Statement Gait to be better, to not trip when walking, and to work.    General Information Comments Son is PCA who lives with Pt, assists with meals, laundry, chores, etc. Will go out into community to do grocery shopping but requires assistance in pushing cart when heavy d/t fatigue. Pt used to have membership to gym however is not currently going, would be open to going with her son who also has a membership.Previously worked and would like to get back to doing so part time, is open to jobs with less walking/standing required. Sometimes will drive but currently taking advantage of ride assistance.   Fall Risk Screen   Fall screen completed by PT   Have you fallen 2 or more times in the past year? Yes   Have you fallen and had an injury in the past year? Yes   Timed Up and Go score (seconds) 15.63  (No AD, SBA-CGA. Increased time required for turns)   Is patient a fall risk? Yes   Fall screen comments Hurt R long finger after fall, will have hand rehab in May. Reports being able to catch herself when falling with UEs. Typically occur after catching L toes on ground.   Pain   Pain comments Not reported during session.   Vitals Signs   Heart Rate 71  (L UE, sitting, Before activity)   Blood Pressure 145/101  (L UE, sitting, Before activity)   Vital Signs Comments After 6MWT: BP = 148/98 mmHg, HR = 71 bpm. On medications for BP. Has home unit, monitors 1x/wk   Cognitive Status Examination   Orientation orientation to person, place and time   Level of Consciousness alert   Follows Commands and Answers Questions 100% of the time;able to follow multistep instructions   Personal Safety and Judgment intact  (verbalizes fall reduction strategies)   Memory intact   Cognitive Comment No deficits in cognition/memory noted. Utilizes ADs appropriately.    Integumentary   Integumentary Comments No deficits noted.   Posture    Posture Comments Stands fully upright, L elbow held in flexion during  standing and ambulation.   Transfer Skills   Transfer Comments Increased effort for sit to stands without UE support. Heavy reliance on weight shift to R to achieve standing with L foot anteriorly placed.    Gait   Gait Comments Without cane: Pt ambulates with step through gait, short step/stride lengths, L audible foot drop, L knee hyperextension in midstance, diminished arm swing (L UE flexion position), attempts at trunk rotation however unequal and diminished bilaterally. With cane, same deficitis identified with increased ease of gait, though mild reduction in L foot drop upon initial contact. With fatigue, amplification of L foot drop with audible forefoot contact, increased L knee hyperextension moment in midstance, R hip drop in L midstance secondary to extensor moment on L hip resulting in pelvic rotation, and reduced pace.   Gait Special Tests   Gait Special Tests 25 FOOT TIMED WALK;DYNAMIC GAIT INDEX;SIX MINUTE WALK TEST   Gait Special Tests 25 Foot Timed Walk   Seconds 11.47   Comments Without AD, SBA.   Gait Special Tests Dynamic Gait Index   Score out of 24 16   Comments Gait deviations included L foot drop. Required increased time to step over box, only able to clear by leading with R LE. Significantly effortful reciprocal pattern to ascend stairs with single to bilateral railing support.   Gait Special Tests Six Minute Walk Test   Feet 645 Feet   Comments With SEC, SBA. Pt reports onset of fatigue at 3 min 44 sec, reduction in gait speed noted at this time. In 2 min: 200', In 4 min: 410'. No change in step/stride length with fatigue. See gait analysis above.    Balance Special Tests   Balance Special Tests Timed up and go   Balance Special Tests Timed Up and Go   Seconds 15.63 Seconds   Comments No AD, SBA-CGA. Increased time to complete turn. L foot drop noted throughout   Coordination   Coordination Comments Pt ptaric  coordination to remove mask with simultaneous holding of cup of water.   Muscle Tone   Muscle Tone Comments Pt with diagnosis of L LE spasticity. In fucntional mobility, Pt demo increased tone in  L gastrocs as evidenced by L knee hyperextension in midstance, suspected L hip extensor tone d/t gait deviations as noted above. In standing/ambulation, Pt demo elbow/shoulder flexion consistent with potential increase in flexor tone. Further assessment warranted.    Planned Therapy Interventions   Planned Therapy Interventions balance training;gait training;motor coordination training;neuromuscular re-education;orthotic fitting/training;ROM;strengthening;stretching   Clinical Impression   Criteria for Skilled Therapeutic Interventions Met yes, treatment indicated   PT Diagnosis L LE spasticity and force production deficit impacting efficiency of gait and functional mobility with increased risk for falls   Influenced by the following impairments L LE spasticity (increased PF tone, suspect L hip extensor tone), weakness in L ankle dorsiflesors as evidenced by L foot drop upon initial contact, impaired balance with use of AD in gait for stability and energy conservation, TUG score of 15.36 sec and DGI of 16/24 both indicating increased risk for falls   Functional limitations due to impairments Impaired efficiency of gait and stair climbing resulting in increased energy expenditure, diminished ability to perform IADLs and ADLs secondary to fatigue, diminished ability to engage in meaningful work tasks at this time, reduced efficiency of functional transfers (sit to stands)   Clinical Presentation Stable/Uncomplicated   Clinical Presentation Rationale Pt reports of consistency in L foot drop symptoms   Clinical Decision Making (Complexity) Low complexity   Therapy Frequency other (see comments)   Predicted Duration of Therapy Intervention (days/wks) Up to 10 visits in 90 days   Risk & Benefits of therapy have been explained  Yes   Patient, Family & other staff in agreement with plan of care Yes   Clinical Impression Comments Pt is a 57 year old female with PMH of multiple sclerosis, HTN, and hyperlipidemia presenting with L LE spasticity and primary complaint of L foot drop and LLE weakness impacting her ability to efficiently peroform gait, sit to stand tranfers, and increasing her risk for falling. Pt would benefit from skilled physical therapy for spasticity and tone management education and treatment, gait training with introduction of AFO, and general strengthening with education regarding energy conservation techniques.   Education Assessment   Preferred Learning Style Listening;Demonstration;Pictures/video   GOALS   PT Eval Goals 1;3;2;4;5;6   Goal 1   Goal Identifier TUG   Goal Description Pt will perform the TUG in <13.5 seconds without an assistive device in order to demonstrate reduced risk of falls and improved efficiency of gait and functional strength.   Target Date 06/21/22   Goal 2   Goal Identifier DGI   Goal Description Pt will score >19/24 on the DGI (from 16) in order to demonstrate reduced risk for falls, improved gait mechanics for stepping over objects in her environment, and demonstrate increased safety in functional mobility.   Target Date 06/21/22   Goal 3   Goal Identifier 6MWT/Walking   Goal Description Pt will ambulate >774 ft on the 6MWT (20% increase) with a SEC and without changes in L foot drop with fatigue throughout duration in order to demonstrate improved efficiency of gait for the ability to ambulate in her community and work re-integration.   Target Date 06/21/22   Goal 4   Goal Identifier Stairs   Goal Description Pt will ascend/descend 12 stairs with use of single railing and R SEC without catching L foot on step for improved safety, energy conservation, and increased ease in navigating in her apartment.   Target Date 06/21/22   Goal 5   Goal Identifier HEP   Goal Description Pt will demo 100%  compliance and independence in HEP with consideration of energy conservation in order to improve functional strength and increase quality of life.    Target Date 06/21/22   Goal 6   Goal Identifier Tone/Spasticity Management   Goal Description Pt will verbalize at least 3 strategies to self-manage spasticity in order to maintain carryover into functional mobility, capitalize on effects of Botox injections, and improve quality of life.    Target Date 06/21/22   Total Evaluation Time   PT Eval, Low Complexity Minutes (36657) 35       Maria Fernanda Hyman DPT, NCS  Physical Therapist  [address]  [place] [address]  [email or web address]  Office: [phone]  Fax: [phone]

## 2022-03-25 NOTE — PROGRESS NOTES
"       PM&R Clinic Note     Patient Name: Marichuy Navarrete : 1965 Medical Record: [id number]            History of Present Illness:     Marichuy Navarrete is a 57 year old female with h/o multiple sclerosis who was referred to our clinic for management of spasticity. Initial consult on 3/7/22; referral from Dr. Borja.    Background from the consult note  She also has h/o left SI joint fusion (2019) and was referred to Dr. Broja by Dr. Matos for the management of right gluteal/piriformis pain and acute right proximal hamstring tear. She was also referred to [facility] neurology team to establish care.       Symptoms,  --Pain at left knee, left buttock and poserior thigh area on the left   --weakness in LLE > LUE  --gest spasms in LLE but not often   --no paresthesia   --no issues with bowel or bladder function   --she has no issues with swallowing but recently has had some choking episodes with severe cough     Meds,  --Flexeril - took it soon after it was prescribed but stopped due to no benefits   --Tizanidine - takes 2-4 mg every other night - helps with spasms  - no trial of higher dose   --Gabapentin - new just prescribed - not taking it due to concerns for side effects   --Was on baclofen before but doesn't remember why it was discontinued    Therapies/HEP/equipments,  Worked with PT 2-3 months ago working on \"torn hamstring\" - finished in Nov  Was doing her HEP but not lately     Functionally,   Have not worked since  - stopped working due to multiple sclerosis   Has PCA (her son) who helps with getting in the tub, house chores, laundry and cooking   She is mod I with basic ADLs and mobility using a SEC  Uses a 4ww for longer distances like walking in a park or grocery store  Drives occasionally   Left hand-dominant        Interval history   She has been medically stable. Saw Dr. Ramsey; no medication was recommended. Should f/u yearly with repeat imaging.   No new symptoms.  Has been working with Maria Fernanda " "in PT which is very beneficial. Will get a L AFO soon. Per Maria Fernanda, \"As far as botox, would definitely recommend gastroc as I think that is causing the knee hyperextension at mid-stance. She has a lot of trouble breaking tone to lift the L LE up for stairs or stepping over even low objects - I will defer to your expertise as to what may help manage that in terms of botox. We will work on movement strategies to try to break the synergy too. Just curious, are you planning to botox her L UE at some point? It is a lot stiffer with gait than I was anticipating based on the notes from you and neurology. I have a feeling that loosening it a little could help with her gait efficiency and symmetry.\".           Past Medical and Surgical History:     Past Medical History:   Diagnosis Date     Abnormal gait      Cervical radiculopathy      Hypertension      Lumbar radiculopathy      Multiple sclerosis (H)     LLE weakness     Osteopenia      Other chronic pain     SI joint pain, left sciatica     Pure hypercholesterolemia      Renal disease      Past Surgical History:   Procedure Laterality Date     OPTICAL TRACKING SYSTEM FUSION SACRAL ILIAC Left 3/18/2019    Procedure: STEALTH ASSISTED MINIMALLY INVASIVE SACRAL ILIAC JOINT FUSION LEFT;  Surgeon: Jason Matos MD;  Location: [facility] OR            Social History:     Social History     Tobacco Use     Smoking status: Never Smoker     Smokeless tobacco: Never Used   Substance Use Topics     Alcohol use: Not on file       Lives in an apartment with her sons 21 and 22 yo. They have 6 stairs from the parking lot to enter the building and then 12 to the second floor where her bedroom is located            Functional history:     See HPI           Family History:     No family history on file.         Medications:     Current Outpatient Medications   Medication Sig Dispense Refill     acetaminophen (TYLENOL) 325 MG tablet Take 1-2 tablets (325-650 mg) by mouth every 6 hours as needed " "for pain 100 tablet 0     amLODIPine (NORVASC) 5 MG tablet Take 5 mg by mouth       cholecalciferol 1000 units TABS Take 1,000 Units by mouth        cyclobenzaprine (FLEXERIL) 5 MG tablet Take 1-2 tablets (5-10 mg) by mouth nightly as needed for muscle spasms 60 tablet 1     DULoxetine (CYMBALTA) 30 MG capsule Take 1 capsule by mouth daily       gabapentin (NEURONTIN) 100 MG capsule Take 100 mg by mouth daily       hydrOXYzine (ATARAX) 25 MG tablet Take 1-2 tabs up to 4 times daily as needed for anxiety, or just at bedtime. May make sleepy.       rosuvastatin (CRESTOR) 20 MG tablet Take 20 mg by mouth       tiZANidine (ZANAFLEX) 2 MG tablet Take 2-4 mg by mouth       SENNA-docusate sodium (SENNA S) 8.6-50 MG tablet Take 1 tablet by mouth 2 times daily as needed (take to prevent constipation while taking narcotic pain medication) (Patient not taking: Reported on 3/28/2022) 30 tablet 0            Allergies:     Allergies   Allergen Reactions     Ibuprofen      Other reaction(s): GI Upset              ROS:     A focused ROS is negative other than the symptoms noted above in the HPI.           Physical Examiniation:     VITAL SIGNS: /79   Pulse 61   Ht 1.676 m (5' 6\")   Wt 72.1 kg (159 lb)   SpO2 100%   BMI 25.66 kg/m    BMI: Estimated body mass index is 25.66 kg/m  as calculated from the following:    Height as of this encounter: 1.676 m (5' 6\").    Weight as of this encounter: 72.1 kg (159 lb).    Gen: NAD, pleasant and cooperative   Cardio: regular pulse  Pulm: non-labored breathing in room air  Abd: benign  Ext: WWP, no edema in BLE, no tenderness in calves  Neuro/MSK:   RUE and RLE strength 5/5   LUE 4+/5  L HF 2/5, KF/KE 4/5, DF/EHL 1/5 and PF 4/5  Sustained clonus at L ankle   Increased tone at L KF and PF             Laboratory/Imagin2021 MRI lumbar spine ([facility])  Interpretation:  Segment and alignment: Normal segmentation of the lumbar vertebral elements with a vestigial S1-2 " disc which shows normal signal characteristics.  S1 is a nonmobile segment.  Sacrum and sacroiliac joints: Interim left SI joint fusion using a cage assisted minimally invasive technique is apparent.  Limited views of the right sacrum and sacroiliac joint are normal.  Conus/distal cord: Normal signal intensity within the conus medullaris and visualized lower spinal cord is demonstrated.  No intradural mass or arachnoidal adhesions are evident.  Osseous and paraspinous structures: There are no paraspinous soft tissue abnormalities.  L5-S1: Disc signal and contour are normal.  The canal and foramina are widely patent.  L4-L5: There is interim progression of disc degeneration with left paracentral annular fissure, and left foraminal and far lateral broad-based 4 mm protrusion.  There is mild to moderate foraminal stenosis without ganglionic impingement.  The canal remains patent.  L3-L4: Early disc degeneration is evident.  There is subtle annular bulging with a broad-based 4 mm left foraminal and far lateral protrusion, increased in size since the previous exam.  There is crowding but no impingement of the exiting nerve root ganglion.  The right foramen and canal remain patent.  The L2-3, L1-2, and L T12-L1 levels are unremarkable.           Assessment/Plan:       Primary progressive multiple sclerosis     Left spastic hemiparesis     Partial tear of right hamstring. Moderate tendinopathy and moderate grade ill-defined partial tearing of the right semimembranosus tendon at the right ischial tuberosity attachment.    Piriformis syndrome, right    Left SI joint fusion (2019)       1. Patient education: In depth discussion and education was provided about the assessment and implications of each of the below recommendations for management. Patient indicated readiness to learn, all questions were answered and understanding of material presented was confirmed.    2. Work-up: none today     3. Therapy/equipment/braces:  continue PT and HEP    4. Medications: continue tizanidine scheduled 2mg tid with additional dose at night time     5. Interventions: started Botox injections at low dose and will adjust as needed. Will consider injecting LUE pending her response.     6. Referral / follow up with other providers: no new today     7. Follow up: in 12 weeks     Nora Farnsworth MD  Physical Medicine & Rehabilitation          BOTULINUM NEUROTOXIN INJECTION PROCEDURES:    VERIFICATION OF PATIENT IDENTIFICATION AND PROCEDURE     Initials   Patient Name ps   Patient  ps   Procedure Verified by: ps     Prior to the start of the procedure and with procedural staff participation, I verbally confirmed the patient s identity using two indicators, relevant allergies, that the procedure was appropriate and matched the consent or emergent situation, and that the correct equipment/implants were available. Immediately prior to starting the procedure I conducted the Time Out with the procedural staff and re-confirmed the patient s name, procedure, and site/side. (The Joint Commission universal protocol was followed.)  Yes    Sedation (Moderate or Deep): None      Above assessments performed by:  Nora Farnsworth MD      INDICATION/S FOR PROCEDURE/S:  Marichuy Navarrete is a 57 year old patient with spasticity affecting the  left upper extremity and left lower extremity secondary to a diagnosis of MS with associated  pain, spasms, loss of joint motion, loss of volitional motor control, difficulty with activities of daily living and difficulty with ambulation and transfers.     Her baseline symptoms have been recalcitrant to oral medications and conservative therapy.  She is here today for an injection of Botox.      GOAL OF PROCEDURE:  The goal of this procedure is to increase active range of motion, improve volitional motor control and enhance functional independence associated with spasticity.    TOTAL DOSE ADMINISTERED:  Dose Administered:  100 units  Botox  2:1 units   Diluent Used:  Preservative Free Normal Saline  Total Volume of Diluent Used:  2 ml  Lot # [id number] with Expiration Date: 05/2024  NDC #: Botox 100u ([address]-81)    Medication guide was offered to patient and was accepted.    CONSENT:  The risks, benefits, and treatment options were discussed with Marichuy Navarrete and she agreed to proceed.      Written consent was obtained by PS.     EQUIPMENT USED:  Needle-37mm stimulating/recording  EMG/NCS Machine    SKIN PREPARATION:  Skin preparation was performed using an alcohol wipe.    GUIDANCE DESCRIPTION:  Electro-myographic guidance was necessary throughout the procedure to accurately identify all areas of spastic muscles while avoiding injection of non-spastic muscles and underlying muscles , neighboring nerves and nearby vascular structures.     AREA/MUSCLE INJECTED:    Left   Gastrocnemius 70 units   Hamstring 30 units     RESPONSE TO PROCEDURE:  Marichuy Navarrete tolerated the procedure well and there were no immediate complications.  She was allowed to recover for an appropriate period of time and was discharged home in stable condition.

## 2022-03-25 NOTE — PROGRESS NOTES
[facility]                                                                                   OUTPATIENT PHYSICAL THERAPY FUNCTIONAL EVALUATION  PLAN OF TREATMENT FOR OUTPATIENT REHABILITATION  (COMPLETE FOR INITIAL CLAIMS ONLY)  Patient's Last Name, First Name, M.I.  YOB: 1965  Marichuy Navarrete     Provider's Name   [facility]   Medical Record No.  [id number]     Start of Care Date:  03/24/22   Onset Date:  03/14/22 (Date of referral)   Type:     _X__PT   ____OT  ____SLP Medical Diagnosis:  Multiple sclerosis (H) G35  - Primary; Spasticity R25.2; Left foot drop M21.372      PT Diagnosis:  L LE spasticity and force production deficit impacting efficiency of gait and functional mobility with increased risk for falls Visits from SOC:  1                              __________________________________________________________________________________  Plan of Treatment/Functional Goals:  balance training, gait training, motor coordination training, neuromuscular re-education, orthotic fitting/training, ROM, strengthening, stretching           GOALS  TUG  Pt will perform the TUG in <13.5 seconds without an assistive device in order to demonstrate reduced risk of falls and improved efficiency of gait and functional strength.  06/21/22    DGI  Pt will score >19/24 on the DGI (from 16) in order to demonstrate reduced risk for falls, improved gait mechanics for stepping over objects in her environment, and demonstrate increased safety in functional mobility.  06/21/22    6MWT/Walking  Pt will ambulate >774 ft on the 6MWT (20% increase) with a SEC and without changes in L foot drop with fatigue throughout duration in order to demonstrate improved efficiency of gait for the ability to ambulate in her community and work re-integration.  06/21/22    Stairs  Pt will ascend/descend  12 stairs with use of single railing and R SEC without catching L foot on step for improved safety, energy conservation, and increased ease in navigating in her apartment.  06/21/22    HEP  Pt will demo 100% compliance and independence in HEP with consideration of energy conservation in order to improve functional strength and increase quality of life.   06/21/22    Tone/Spasticity Management  Pt will verbalize at least 3 strategies to self-manage spasticity in order to maintain carryover into functional mobility, capitalize on effects of Botox injections, and improve quality of life.   06/21/22       Therapy Frequency:  other (see comments)   Predicted Duration of Therapy Intervention:  Up to 10 visits in 90 days    Sayra Hyman, PT                                    I CERTIFY THE NEED FOR THESE SERVICES FURNISHED UNDER        THIS PLAN OF TREATMENT AND WHILE UNDER MY CARE     (Physician co-signature of this document indicates review and certification of the therapy plan).                Certification Date From:  03/24/22   Certification Date To:  06/21/22    Referring Provider:  Nora Farnsworth MD    Initial Assessment  See Epic Evaluation- Start of Care Date: 03/24/22

## 2022-03-28 ENCOUNTER — OFFICE VISIT (OUTPATIENT)
Dept: PHYSICAL MEDICINE AND REHAB | Facility: CLINIC | Age: 57
End: 2022-03-28
Payer: COMMERCIAL

## 2022-03-28 VITALS
OXYGEN SATURATION: 100 % | HEART RATE: 61 BPM | BODY MASS INDEX: 25.55 KG/M2 | DIASTOLIC BLOOD PRESSURE: 79 MMHG | HEIGHT: 66 IN | SYSTOLIC BLOOD PRESSURE: 126 MMHG | WEIGHT: 159 LBS

## 2022-03-28 DIAGNOSIS — R25.2 SPASTICITY: Primary | ICD-10-CM

## 2022-03-28 DIAGNOSIS — G35 MULTIPLE SCLEROSIS (H): ICD-10-CM

## 2022-03-28 PROCEDURE — 96372 THER/PROPH/DIAG INJ SC/IM: CPT | Performed by: PHYSICAL MEDICINE & REHABILITATION

## 2022-03-28 PROCEDURE — 95874 GUIDE NERV DESTR NEEDLE EMG: CPT | Performed by: PHYSICAL MEDICINE & REHABILITATION

## 2022-03-28 PROCEDURE — 64642 CHEMODENERV 1 EXTREMITY 1-4: CPT | Mod: 59 | Performed by: PHYSICAL MEDICINE & REHABILITATION

## 2022-03-28 ASSESSMENT — PAIN SCALES - GENERAL: PAINLEVEL: NO PAIN (0)

## 2022-03-28 NOTE — NURSING NOTE
Patient presents with:  RECHECK: UMP RETURN      No Pain (0)     Pain Medications     Analgesics Other Refills Start End     acetaminophen (TYLENOL) 325 MG tablet    0 3/18/2019     Sig - Route: Take 1-2 tablets (325-650 mg) by mouth every 6 hours as needed for pain - Oral    Class: E-Prescribe        Goran Andrews, EMT

## 2022-03-28 NOTE — LETTER
"3/28/2022       RE: Marichuy Navarrete  [address]  [place] [address]     Dear Colleague,    Thank you for referring your patient, Marichuy Navarrete, to the [facility] PHYSICAL MEDICINE AND REHABILITATION CLINIC [place] [facility]. Please see a copy of my visit note below.           PM&R Clinic Note     Patient Name: Marichuy Navarrete : 1965 Medical Record: [id number]            History of Present Illness:     Marichuy Navarrete is a 57 year old female with h/o multiple sclerosis who was referred to our clinic for management of spasticity. Initial consult on 3/7/22; referral from Dr. Borja.    Background from the consult note  She also has h/o left SI joint fusion () and was referred to Dr. Borja by Dr. Matos for the management of right gluteal/piriformis pain and acute right proximal hamstring tear. She was also referred to MS neurology team to establish care.       Symptoms,  --Pain at left knee, left buttock and poserior thigh area on the left   --weakness in LLE > LUE  --gest spasms in LLE but not often   --no paresthesia   --no issues with bowel or bladder function   --she has no issues with swallowing but recently has had some choking episodes with severe cough     Meds,  --Flexeril - took it soon after it was prescribed but stopped due to no benefits   --Tizanidine - takes 2-4 mg every other night - helps with spasms  - no trial of higher dose   --Gabapentin - new just prescribed - not taking it due to concerns for side effects   --Was on baclofen before but doesn't remember why it was discontinued    Therapies/HEP/equipments,  Worked with PT 2-3 months ago working on \"torn hamstring\" - finished in Nov  Was doing her HEP but not lately     Functionally,   Have not worked since  - stopped working due to multiple sclerosis   Has PCA (her son) who helps with getting in the tub, house chores, laundry and cooking   She is mod I with " "basic ADLs and mobility using a SEC  Uses a 4ww for longer distances like walking in a park or grocery store  Drives occasionally   Left hand-dominant        Interval history   She has been medically stable. Saw Dr. Ramsey; no medication was recommended. Should f/u yearly with repeat imaging.   No new symptoms.  Has been working with Maria Fernanda in PT which is very beneficial. Will get a L AFO soon. Per Maria Fernanda, \"As far as botox, would definitely recommend gastroc as I think that is causing the knee hyperextension at mid-stance. She has a lot of trouble breaking tone to lift the L LE up for stairs or stepping over even low objects - I will defer to your expertise as to what may help manage that in terms of botox. We will work on movement strategies to try to break the synergy too. Just curious, are you planning to botox her L UE at some point? It is a lot stiffer with gait than I was anticipating based on the notes from you and neurology. I have a feeling that loosening it a little could help with her gait efficiency and symmetry.\".           Past Medical and Surgical History:     Past Medical History:   Diagnosis Date     Abnormal gait      Cervical radiculopathy      Hypertension      Lumbar radiculopathy      Multiple sclerosis (H)     LLE weakness     Osteopenia      Other chronic pain     SI joint pain, left sciatica     Pure hypercholesterolemia      Renal disease      Past Surgical History:   Procedure Laterality Date     OPTICAL TRACKING SYSTEM FUSION SACRAL ILIAC Left 3/18/2019    Procedure: STEALTH ASSISTED MINIMALLY INVASIVE SACRAL ILIAC JOINT FUSION LEFT;  Surgeon: Jason Matos MD;  Location: [facility] OR            Social History:     Social History     Tobacco Use     Smoking status: Never Smoker     Smokeless tobacco: Never Used   Substance Use Topics     Alcohol use: Not on file       Lives in an apartment with her sons 21 and 24 yo. They have 6 stairs from the parking lot to enter the building and then " "12 to the second floor where her bedroom is located            Functional history:     See HPI           Family History:     No family history on file.         Medications:     Current Outpatient Medications   Medication Sig Dispense Refill     acetaminophen (TYLENOL) 325 MG tablet Take 1-2 tablets (325-650 mg) by mouth every 6 hours as needed for pain 100 tablet 0     amLODIPine (NORVASC) 5 MG tablet Take 5 mg by mouth       cholecalciferol 1000 units TABS Take 1,000 Units by mouth        cyclobenzaprine (FLEXERIL) 5 MG tablet Take 1-2 tablets (5-10 mg) by mouth nightly as needed for muscle spasms 60 tablet 1     DULoxetine (CYMBALTA) 30 MG capsule Take 1 capsule by mouth daily       gabapentin (NEURONTIN) 100 MG capsule Take 100 mg by mouth daily       hydrOXYzine (ATARAX) 25 MG tablet Take 1-2 tabs up to 4 times daily as needed for anxiety, or just at bedtime. May make sleepy.       rosuvastatin (CRESTOR) 20 MG tablet Take 20 mg by mouth       tiZANidine (ZANAFLEX) 2 MG tablet Take 2-4 mg by mouth       SENNA-docusate sodium (SENNA S) 8.6-50 MG tablet Take 1 tablet by mouth 2 times daily as needed (take to prevent constipation while taking narcotic pain medication) (Patient not taking: Reported on 3/28/2022) 30 tablet 0            Allergies:     Allergies   Allergen Reactions     Ibuprofen      Other reaction(s): GI Upset              ROS:     A focused ROS is negative other than the symptoms noted above in the HPI.           Physical Examiniation:     VITAL SIGNS: /79   Pulse 61   Ht 1.676 m (5' 6\")   Wt 72.1 kg (159 lb)   SpO2 100%   BMI 25.66 kg/m    BMI: Estimated body mass index is 25.66 kg/m  as calculated from the following:    Height as of this encounter: 1.676 m (5' 6\").    Weight as of this encounter: 72.1 kg (159 lb).    Gen: NAD, pleasant and cooperative   Cardio: regular pulse  Pulm: non-labored breathing in room air  Abd: benign  Ext: WWP, no edema in BLE, no tenderness in " calves  Neuro/MSK:   RUE and RLE strength 5/5   LUE 4+/5  L HF 2/5, KF/KE 4/5, DF/EHL 1/5 and PF 4/5  Sustained clonus at L ankle   Increased tone at L KF and PF             Laboratory/Imagin2021 MRI lumbar spine ([facility])  Interpretation:  Segment and alignment: Normal segmentation of the lumbar vertebral elements with a vestigial S1-2 disc which shows normal signal characteristics.  S1 is a nonmobile segment.  Sacrum and sacroiliac joints: Interim left SI joint fusion using a cage assisted minimally invasive technique is apparent.  Limited views of the right sacrum and sacroiliac joint are normal.  Conus/distal cord: Normal signal intensity within the conus medullaris and visualized lower spinal cord is demonstrated.  No intradural mass or arachnoidal adhesions are evident.  Osseous and paraspinous structures: There are no paraspinous soft tissue abnormalities.  L5-S1: Disc signal and contour are normal.  The canal and foramina are widely patent.  L4-L5: There is interim progression of disc degeneration with left paracentral annular fissure, and left foraminal and far lateral broad-based 4 mm protrusion.  There is mild to moderate foraminal stenosis without ganglionic impingement.  The canal remains patent.  L3-L4: Early disc degeneration is evident.  There is subtle annular bulging with a broad-based 4 mm left foraminal and far lateral protrusion, increased in size since the previous exam.  There is crowding but no impingement of the exiting nerve root ganglion.  The right foramen and canal remain patent.  The L2-3, L1-2, and L T12-L1 levels are unremarkable.           Assessment/Plan:       Primary progressive multiple sclerosis     Left spastic hemiparesis     Partial tear of right hamstring. Moderate tendinopathy and moderate grade ill-defined partial tearing of the right semimembranosus tendon at the right ischial tuberosity attachment.    Piriformis syndrome, right    Left SI joint  fusion (2019)       1. Patient education: In depth discussion and education was provided about the assessment and implications of each of the below recommendations for management. Patient indicated readiness to learn, all questions were answered and understanding of material presented was confirmed.    2. Work-up: none today     3. Therapy/equipment/braces: continue PT and HEP    4. Medications: continue tizanidine scheduled 2mg tid with additional dose at night time     5. Interventions: started Botox injections at low dose and will adjust as needed. Will consider injecting LUE pending her response.     6. Referral / follow up with other providers: no new today     7. Follow up: in 12 weeks     Nora Farnsworth MD  Physical Medicine & Rehabilitation          BOTULINUM NEUROTOXIN INJECTION PROCEDURES:    VERIFICATION OF PATIENT IDENTIFICATION AND PROCEDURE     Initials   Patient Name ps   Patient  ps   Procedure Verified by: ps     Prior to the start of the procedure and with procedural staff participation, I verbally confirmed the patient s identity using two indicators, relevant allergies, that the procedure was appropriate and matched the consent or emergent situation, and that the correct equipment/implants were available. Immediately prior to starting the procedure I conducted the Time Out with the procedural staff and re-confirmed the patient s name, procedure, and site/side. (The Joint Commission universal protocol was followed.)  Yes    Sedation (Moderate or Deep): None      Above assessments performed by:  Nora Farnsworth MD      INDICATION/S FOR PROCEDURE/S:  Marichuy Navarrete is a 57 year old patient with spasticity affecting the  left upper extremity and left lower extremity secondary to a diagnosis of MS with associated  pain, spasms, loss of joint motion, loss of volitional motor control, difficulty with activities of daily living and difficulty with ambulation and transfers.     Her baseline symptoms  have been recalcitrant to oral medications and conservative therapy.  She is here today for an injection of Botox.      GOAL OF PROCEDURE:  The goal of this procedure is to increase active range of motion, improve volitional motor control and enhance functional independence associated with spasticity.    TOTAL DOSE ADMINISTERED:  Dose Administered:  100 units Botox  2:1 units   Diluent Used:  Preservative Free Normal Saline  Total Volume of Diluent Used:  2 ml  Lot # [id number] with Expiration Date: 05/2024  NDC #: Botox 100u ([address]-51)    Medication guide was offered to patient and was accepted.    CONSENT:  The risks, benefits, and treatment options were discussed with Marichuy Navarrete and she agreed to proceed.      Written consent was obtained by PS.     EQUIPMENT USED:  Needle-37mm stimulating/recording  EMG/NCS Machine    SKIN PREPARATION:  Skin preparation was performed using an alcohol wipe.    GUIDANCE DESCRIPTION:  Electro-myographic guidance was necessary throughout the procedure to accurately identify all areas of spastic muscles while avoiding injection of non-spastic muscles and underlying muscles , neighboring nerves and nearby vascular structures.     AREA/MUSCLE INJECTED:    Left   Gastrocnemius 70 units   Hamstring 30 units     RESPONSE TO PROCEDURE:  Marichuy Navarrete tolerated the procedure well and there were no immediate complications.  She was allowed to recover for an appropriate period of time and was discharged home in stable condition.        Nora Farnsworth MD

## 2022-03-30 ENCOUNTER — HOSPITAL ENCOUNTER (OUTPATIENT)
Dept: PHYSICAL THERAPY | Facility: CLINIC | Age: 57
Setting detail: THERAPIES SERIES
Discharge: HOME | End: 2022-03-30
Attending: PHYSICAL MEDICINE & REHABILITATION
Payer: COMMERCIAL

## 2022-03-30 PROCEDURE — 97530 THERAPEUTIC ACTIVITIES: CPT | Mod: GP | Performed by: PHYSICAL THERAPIST

## 2022-03-30 PROCEDURE — 97110 THERAPEUTIC EXERCISES: CPT | Mod: GP | Performed by: PHYSICAL THERAPIST

## 2022-03-30 NOTE — DISCHARGE INSTRUCTIONS
[facility]: [phone]  [facility] [place] [address]  [place] [address]    You can schedule to get a Left AFO - I asked Dr Farnsworth's team to put in an order.

## 2022-04-06 ENCOUNTER — HOSPITAL ENCOUNTER (OUTPATIENT)
Dept: PHYSICAL THERAPY | Facility: CLINIC | Age: 57
Setting detail: THERAPIES SERIES
Discharge: HOME | End: 2022-04-06
Attending: PHYSICAL MEDICINE & REHABILITATION
Payer: COMMERCIAL

## 2022-04-06 PROCEDURE — 97110 THERAPEUTIC EXERCISES: CPT | Mod: GP | Performed by: PHYSICAL THERAPIST

## 2022-04-06 PROCEDURE — 97530 THERAPEUTIC ACTIVITIES: CPT | Mod: GP | Performed by: PHYSICAL THERAPIST

## 2022-04-12 ENCOUNTER — TELEPHONE (OUTPATIENT)
Dept: PHYSICAL THERAPY | Facility: CLINIC | Age: 57
End: 2022-04-12
Payer: COMMERCIAL

## 2022-04-14 DIAGNOSIS — M21.372 LEFT FOOT DROP: ICD-10-CM

## 2022-04-14 DIAGNOSIS — R25.2 SPASTICITY: Primary | ICD-10-CM

## 2022-04-14 DIAGNOSIS — G35 MULTIPLE SCLEROSIS (H): ICD-10-CM

## 2022-04-21 ENCOUNTER — HOSPITAL ENCOUNTER (OUTPATIENT)
Dept: PHYSICAL THERAPY | Facility: CLINIC | Age: 57
Setting detail: THERAPIES SERIES
Discharge: HOME | End: 2022-04-21
Attending: PHYSICAL MEDICINE & REHABILITATION
Payer: COMMERCIAL

## 2022-04-21 PROCEDURE — 97110 THERAPEUTIC EXERCISES: CPT | Mod: GP | Performed by: PHYSICAL THERAPIST

## 2022-04-21 PROCEDURE — 97530 THERAPEUTIC ACTIVITIES: CPT | Mod: GP | Performed by: PHYSICAL THERAPIST

## 2022-04-28 ENCOUNTER — HOSPITAL ENCOUNTER (OUTPATIENT)
Dept: PHYSICAL THERAPY | Facility: CLINIC | Age: 57
Setting detail: THERAPIES SERIES
Discharge: HOME | End: 2022-04-28
Attending: PHYSICAL MEDICINE & REHABILITATION
Payer: COMMERCIAL

## 2022-04-28 PROCEDURE — 97110 THERAPEUTIC EXERCISES: CPT | Mod: GP | Performed by: PHYSICAL THERAPIST

## 2022-05-05 ENCOUNTER — HOSPITAL ENCOUNTER (OUTPATIENT)
Dept: PHYSICAL THERAPY | Facility: CLINIC | Age: 57
Setting detail: THERAPIES SERIES
Discharge: HOME | End: 2022-05-05
Attending: PHYSICAL MEDICINE & REHABILITATION
Payer: COMMERCIAL

## 2022-05-05 PROCEDURE — 97750 PHYSICAL PERFORMANCE TEST: CPT | Mod: GP | Performed by: PHYSICAL THERAPIST

## 2022-05-05 PROCEDURE — 97763 ORTHC/PROSTC MGMT SBSQ ENC: CPT | Mod: GP | Performed by: PHYSICAL THERAPIST

## 2022-05-12 ENCOUNTER — HOSPITAL ENCOUNTER (OUTPATIENT)
Dept: PHYSICAL THERAPY | Facility: CLINIC | Age: 57
Setting detail: THERAPIES SERIES
Discharge: HOME | End: 2022-05-12
Attending: PHYSICAL MEDICINE & REHABILITATION
Payer: COMMERCIAL

## 2022-05-12 PROCEDURE — 97110 THERAPEUTIC EXERCISES: CPT | Mod: GP | Performed by: PHYSICAL THERAPIST

## 2022-05-12 PROCEDURE — 97530 THERAPEUTIC ACTIVITIES: CPT | Mod: GP | Performed by: PHYSICAL THERAPIST

## 2022-05-12 PROCEDURE — 97116 GAIT TRAINING THERAPY: CPT | Mod: GP | Performed by: PHYSICAL THERAPIST

## 2022-05-19 ENCOUNTER — HOSPITAL ENCOUNTER (OUTPATIENT)
Dept: PHYSICAL THERAPY | Facility: CLINIC | Age: 57
Setting detail: THERAPIES SERIES
Discharge: HOME | End: 2022-05-19
Attending: PHYSICAL MEDICINE & REHABILITATION
Payer: COMMERCIAL

## 2022-05-19 PROCEDURE — 97110 THERAPEUTIC EXERCISES: CPT | Mod: GP | Performed by: PHYSICAL THERAPIST

## 2022-05-19 PROCEDURE — 97116 GAIT TRAINING THERAPY: CPT | Mod: GP | Performed by: PHYSICAL THERAPIST

## 2022-05-23 NOTE — LETTER
3/8/2022       RE: Marichuy Navarrete  [address]  [place] [address]     Dear Colleague,    Thank you for referring your patient, Marichuy Navarrete, to the [facility] MULTIPLE SCLEROSIS CLINIC [facility]. Please see a copy of my visit note below.    Neurology Clinic Visit      3/8/2022    Chief Symptom: Multiple sclerosis    History of Present Symptom:  Marichuy Navarrete is a 56 year old left handed female with PMH significant for hypertension and hyperlipidemia referred to multiple sclerosis clinic by PMR clinic.      She first noticed left lower extremity weakness in 2004, when she was living in [place]. Had extensive unrevealing work-up done at that time. In 2014, following left lower extremity weakness and numbness in groin area,  Dr. Roderick Baer, her internal medicine doctor ordered MRI for her which revealed multiple T2 hyper intensities supra and infratentorial and C spine with multiple t2 hyperintensites from c1-c5. None of them were enhancing. She started following up with Dr. Franklin in [facility] and was started on interferon. She stopped taking interferon few months later as it felt that it was making her weakness worse. Ever since that she has not been on any DMT.    Since 2014, when the diagnosis was made she has not noticed any new neurologic deficit. She thinks her left upper extremity is also weaker than her right upper extremity but that has been unchanged throughout the years. The left lower extremity weakness is stable and unchanged. She denies difficulty with vision, speech and swallowing, numbness, dizziness and mental status changes. Denies urinary and bowel incontinence.     At home she can walk without assistance. For walking short distances she uses a cane and for long distances she uses walker.    The main bothersome to her is her lleg spasm. Her whole leg gets very tight and painful after walking. She takes  tizanidine almost every day which has been somewhat helpful. She is also receiving Botox injections from PMR clinic.       The patient's medical, surgical, social, and family history were personally reviewed with the patient.  Past Medical History:   Diagnosis Date     Abnormal gait      Cervical radiculopathy      Hypertension      Lumbar radiculopathy      Multiple sclerosis (H)     LLE weakness     Osteopenia      Other chronic pain     SI joint pain, left sciatica     Pure hypercholesterolemia      Renal disease       Past Surgical History:   Procedure Laterality Date     OPTICAL TRACKING SYSTEM FUSION SACRAL ILIAC Left 3/18/2019    Procedure: STEALTH ASSISTED MINIMALLY INVASIVE SACRAL ILIAC JOINT FUSION LEFT;  Surgeon: Jason Matos MD;  Location:  OR     Social History     Socioeconomic History     Marital status:      Spouse name: Not on file     Number of children: Not on file     Years of education: Not on file     Highest education level: Not on file   Occupational History     Not on file   Tobacco Use     Smoking status: Never Smoker     Smokeless tobacco: Never Used   Substance and Sexual Activity     Alcohol use: Not on file     Drug use: Not on file     Sexual activity: Not on file   Other Topics Concern     Not on file   Social History Narrative     Not on file     Social Determinants of Health     Financial Resource Strain: Not on file   Food Insecurity: Not on file   Transportation Needs: Not on file   Physical Activity: Not on file   Stress: Not on file   Social Connections: Not on file   Intimate Partner Violence: Not on file   Housing Stability: Not on file     No family history on file.  Current Outpatient Medications   Medication     acetaminophen (TYLENOL) 325 MG tablet     amLODIPine (NORVASC) 5 MG tablet     cholecalciferol 1000 units TABS     cyclobenzaprine (FLEXERIL) 5 MG tablet     DULoxetine (CYMBALTA) 30 MG capsule     gabapentin (NEURONTIN) 100 MG capsule      hydrOXYzine (ATARAX) 25 MG tablet     rosuvastatin (CRESTOR) 20 MG tablet     SENNA-docusate sodium (SENNA S) 8.6-50 MG tablet     tiZANidine (ZANAFLEX) 2 MG tablet     Current Facility-Administered Medications   Medication     lidocaine (PF) (XYLOCAINE) 1 % injection 6 mL     triamcinolone (KENALOG-40) injection 40 mg     Allergies   Allergen Reactions     Ibuprofen      Other reaction(s): GI Upset       Review of Systems:  14-point review of systems was completed and is in the scanned health questionnaire from this visit. The pertinent positives and negatives are in the HPI.    Physical Exam:  BP (!) 140/88 (BP Location: Left arm, Patient Position: Chair, Cuff Size: Adult Large)   Pulse 80   Wt 70.8 kg (156 lb)   SpO2 99%   BMI 25.18 kg/m    Mental status: Awake, alert, attentive, Language is fluent and coherent with intact comprehension of commands.   Cranial nerves: PERRL, conjugate gaze, EOMI, facial sensation intact, face symmetric, shoulder shrug strong, tongue/uvula midline, no dysarthria.   Motor: Has spasticity in the left lower extremity. No atrophy or fasciculations. 5/5 strength in the right upper and lower extremities. Left shoulder abduction adduction 5/5, left elbow flexion extension 5/5, left wrist extension, finger extension and APB's 4/5, left hip extension and flexion 3-4/5, left knee flexion extension 5/5, left foot dorsiflexion 4/5 ( limited ROM), plantarflexion 5/5  Sensory:  Sensation intact to light touch on arms and legs bilaterally. Negative Romberg.  Coordination:  Finger-nose-finger intact bilaterally. Finger tapping regular and rhythmic bilaterally.  Reflexes:   Brisk and symmetric in triceps, biceps, brachioradialis, patellar, Achilles  Gait: Able to walk without walker. Able to get up from chair with no assistance. Drags left foot when walking. Unable to walk on her heels. Has difficulty with tandem gait.      Investigations:    I personally reviewed all relevant labs and imaging  How Severe Is Your Skin Discoloration?: mild in EMR.      Assessment/Plan:    Marichuy Navarrete is a 56 year old female with a PMH significant for hypertension and hyperlipidemia referred to multiple sclerosis clinic by PMR clinic. Given her story and clinical presentation most likely she has primary progressive multiple sclerosis. Fortunately this has been stable since 2014. Her most recent brain and C-spine MRI in 2015 shows stable multiple T2 hyperintensities with no enhancing lesion. Given her age, type of MS and stability of symptoms she does not benefit from DMT. We will have her follow-up with us once a year for monitoring..    -C-spine and brain MRI in 1 year  -Follow-up in clinic in 1 year    I have seen and examined the patient with neurology attending Dr. Ramsey who agrees with my plan.     Araceli Duran MD  Neurology Resident PGY3     I saw and examined this patient, and have read and edited the resident's note.  I agree with the findings, assessment, and plan.  I spent 54 minutes on her care on the date of service, including chart review and face to face time.  Marichuy had insidious onset LLE pain and clumsiness in the mid-2000s.  She began following with Dr. Franklin in 2011 at [facility], was eventually diagnosed with MS on the basis of MRIs in 2014.  Brain MRI shows a moderate to severe burden of lesions typical for MS, C spine MRI shows focal cord lesions at C2, C3, C4-5.  Never had CSF.  No history of relapses.  She has PPMS, but reports stability for many years.  Spasticity being managed by PM&R.  She has pending PT at [facility], may benefit from AFO.  Plan as above.      Matias Ramsey MD

## 2022-06-13 ENCOUNTER — HOSPITAL ENCOUNTER (OUTPATIENT)
Dept: PHYSICAL THERAPY | Facility: CLINIC | Age: 57
Setting detail: THERAPIES SERIES
Discharge: HOME | End: 2022-06-13
Attending: PHYSICAL MEDICINE & REHABILITATION
Payer: COMMERCIAL

## 2022-06-13 PROCEDURE — 97530 THERAPEUTIC ACTIVITIES: CPT | Mod: GP | Performed by: PHYSICAL THERAPIST

## 2022-06-13 PROCEDURE — 97116 GAIT TRAINING THERAPY: CPT | Mod: GP | Performed by: PHYSICAL THERAPIST

## 2022-06-13 NOTE — DISCHARGE INSTRUCTIONS
6/13/22    CALL DARYL and get an sammi't to see him - bring the brace - I am hoping he will exchange it for you to get a better one - we tried a white plastic one here.  I emailed him.     COOLING -   Go out in early part of day  Air cond in car and apartment  Fans  Cold water  Cool shower not hot  Cold towels on neck  Or use cold gel packs on neck or armpits  Do these ideas to pre cool or cool down when warm    Standing exercises:  keep doing, can also do heel raises and move leg out to side movement.     Evelyn MORILLO  [phone] ,  or my phone:  [phone]

## 2022-06-23 ENCOUNTER — OFFICE VISIT (OUTPATIENT)
Dept: PHYSICAL MEDICINE AND REHAB | Facility: CLINIC | Age: 57
End: 2022-06-23
Payer: COMMERCIAL

## 2022-06-23 VITALS
DIASTOLIC BLOOD PRESSURE: 94 MMHG | OXYGEN SATURATION: 100 % | TEMPERATURE: 98 F | SYSTOLIC BLOOD PRESSURE: 149 MMHG | RESPIRATION RATE: 16 BRPM | HEART RATE: 58 BPM

## 2022-06-23 DIAGNOSIS — G35 MULTIPLE SCLEROSIS (H): ICD-10-CM

## 2022-06-23 DIAGNOSIS — R25.2 SPASTICITY: Primary | ICD-10-CM

## 2022-06-23 PROCEDURE — 96372 THER/PROPH/DIAG INJ SC/IM: CPT | Performed by: PHYSICAL MEDICINE & REHABILITATION

## 2022-06-23 PROCEDURE — 95874 GUIDE NERV DESTR NEEDLE EMG: CPT | Performed by: PHYSICAL MEDICINE & REHABILITATION

## 2022-06-23 PROCEDURE — 64642 CHEMODENERV 1 EXTREMITY 1-4: CPT | Mod: 59 | Performed by: PHYSICAL MEDICINE & REHABILITATION

## 2022-06-23 ASSESSMENT — PAIN SCALES - GENERAL: PAINLEVEL: NO PAIN (0)

## 2022-06-23 NOTE — PROGRESS NOTES
"       PM&R Clinic Note     Patient Name: Marichuy Navarrete : 1965 Medical Record: [id number]            History of Present Illness:     Marichuy Navarrete is a 57 year old female with h/o multiple sclerosis who was referred to our clinic for management of spasticity. Initial consult on 3/7/22; referral from Dr. Borja.    Background from the consult note  She also has h/o left SI joint fusion (2019) and was referred to Dr. Borja by Dr. Matos for the management of right gluteal/piriformis pain and acute right proximal hamstring tear. She was also referred to MS neurology team to establish care.     Symptoms,  --Pain at left knee, left buttock and poserior thigh area on the left   --weakness in LLE > LUE  --gest spasms in LLE but not often   --no paresthesia   --no issues with bowel or bladder function   --she has no issues with swallowing but recently has had some choking episodes with severe cough     Meds,  --Flexeril - took it soon after it was prescribed but stopped due to no benefits   --Tizanidine - takes 2-4 mg every other night - helps with spasms  - no trial of higher dose   --Gabapentin - new just prescribed - not taking it due to concerns for side effects  --Was on baclofen before but doesn't remember why it was discontinued    Therapies/HEP/equipments,  Worked with PT 2-3 months ago working on \"torn hamstring\" - finished in Nov  Was doing her HEP but not lately    Functionally,   Have not worked since  - stopped working due to multiple sclerosis   Has PCA (her son) who helps with getting in the tub, house chores, laundry and cooking   She is mod I with basic ADLs and mobility using a SEC  Uses a 4ww for longer distances like walking in a park or grocery store  Drives occasionally   Left hand-dominant      Interval history since 3/28/22  -- Did not feel like last round of Botox injections did anything. Did not have any adverse reaction. Has same tightness which is worse in the morning. Majority of " tightness in in left quadriceps area. Did not notice worsening in interim. No pain.  -- No new changes in other symptoms. Strength and swallowing are the same.  -- Currently working with Evelyn in physical therapy.  -- Has new AFO and feels like it helps a lot.  -- No new changes in medications, no new medical issues.           Past Medical and Surgical History:     Past Medical History:   Diagnosis Date     Abnormal gait      Cervical radiculopathy      Hypertension      Lumbar radiculopathy      Multiple sclerosis (H)     LLE weakness     Osteopenia      Other chronic pain     SI joint pain, left sciatica     Pure hypercholesterolemia      Renal disease      Past Surgical History:   Procedure Laterality Date     OPTICAL TRACKING SYSTEM FUSION SACRAL ILIAC Left 3/18/2019    Procedure: STEALTH ASSISTED MINIMALLY INVASIVE SACRAL ILIAC JOINT FUSION LEFT;  Surgeon: Jason Matos MD;  Location:  OR            Social History:     Social History     Tobacco Use     Smoking status: Never Smoker     Smokeless tobacco: Never Used   Substance Use Topics     Alcohol use: Not on file     Lives in an apartment with her sons 21 and 22 yo. They have 6 stairs from the parking lot to enter the building and then 12 to the second floor where her bedroom is located          Functional history:     See HPI           Family History:     No family history on file.           Medications:     Current Outpatient Medications   Medication Sig Dispense Refill     acetaminophen (TYLENOL) 325 MG tablet Take 1-2 tablets (325-650 mg) by mouth every 6 hours as needed for pain 100 tablet 0     amLODIPine (NORVASC) 5 MG tablet Take 5 mg by mouth       cholecalciferol 1000 units TABS Take 1,000 Units by mouth        cyclobenzaprine (FLEXERIL) 5 MG tablet Take 1-2 tablets (5-10 mg) by mouth nightly as needed for muscle spasms 60 tablet 1     DULoxetine (CYMBALTA) 30 MG capsule Take 1 capsule by mouth daily       gabapentin (NEURONTIN) 100 MG  "capsule Take 100 mg by mouth daily       hydrOXYzine (ATARAX) 25 MG tablet Take 1-2 tabs up to 4 times daily as needed for anxiety, or just at bedtime. May make sleepy.       rosuvastatin (CRESTOR) 20 MG tablet Take 20 mg by mouth       SENNA-docusate sodium (SENNA S) 8.6-50 MG tablet Take 1 tablet by mouth 2 times daily as needed (take to prevent constipation while taking narcotic pain medication) (Patient not taking: Reported on 3/28/2022) 30 tablet 0     tiZANidine (ZANAFLEX) 2 MG tablet Take 2-4 mg by mouth              Allergies:     Allergies   Allergen Reactions     Ibuprofen      Other reaction(s): GI Upset              ROS:     A focused ROS is negative other than the symptoms noted above in the HPI.           Physical Examiniation:     VITAL SIGNS: BP (!) 149/94   Pulse 58   Temp 98  F (36.7  C)   Resp 16   SpO2 100%   BMI: Estimated body mass index is 25.66 kg/m  as calculated from the following:    Height as of 3/28/22: 1.676 m (5' 6\").    Weight as of 3/28/22: 72.1 kg (159 lb).    Gen: NAD, pleasant and cooperative   Cardio: No edema in BLE, no tenderness in calves  Pulm: Non-labored breathing in room air and with conversation  Abd: Non-distended  Ext: WWP  Neuro/MSK:   RUE and RLE strength 5/5   LUE 4+/5  L HF 3/5, KF/KE 4/5, DF 1/5 and PF 4/5  One beat clonus at left ankle with dorsiflexion  MAS 2 with hip flexion, 2 with knee extension, increased tone in ankle although exam limited by AFO in place           Laboratory/Imagin2021 MRI lumbar spine ([facility])  Interpretation:  Segment and alignment: Normal segmentation of the lumbar vertebral elements with a vestigial S1-2 disc which shows normal signal characteristics.  S1 is a nonmobile segment.  Sacrum and sacroiliac joints: Interim left SI joint fusion using a cage assisted minimally invasive technique is apparent.  Limited views of the right sacrum and sacroiliac joint are normal.  Conus/distal cord: Normal signal " intensity within the conus medullaris and visualized lower spinal cord is demonstrated.  No intradural mass or arachnoidal adhesions are evident.  Osseous and paraspinous structures: There are no paraspinous soft tissue abnormalities.  L5-S1: Disc signal and contour are normal.  The canal and foramina are widely patent.  L4-L5: There is interim progression of disc degeneration with left paracentral annular fissure, and left foraminal and far lateral broad-based 4 mm protrusion.  There is mild to moderate foraminal stenosis without ganglionic impingement.  The canal remains patent.  L3-L4: Early disc degeneration is evident.  There is subtle annular bulging with a broad-based 4 mm left foraminal and far lateral protrusion, increased in size since the previous exam.  There is crowding but no impingement of the exiting nerve root ganglion.  The right foramen and canal remain patent.  The L2-3, L1-2, and L T12-L1 levels are unremarkable.           Assessment/Plan:       Primary progressive multiple sclerosis     Left spastic hemiparesis     Partial tear of right hamstring. Moderate tendinopathy and moderate grade ill-defined partial tearing of the right semimembranosus tendon at the right ischial tuberosity attachment.    Piriformis syndrome, right    Left SI joint fusion (2019)     1. Patient education: In depth discussion and education was provided about the assessment and implications of each of the below recommendations for management. Patient indicated readiness to learn, all questions were answered and understanding of material presented was confirmed.    2. Work-up: none today     3. Therapy/equipment/braces: continue PT and HEP    4. Medications: continue tizanidine scheduled 2mg tid with additional dose at night time     5. Interventions: second round of Botox injections today with increased dose and addition of iliopsoas. Will consider injecting LUE in future pending her response.     6. Referral / follow up  with other providers: no new today     7. Follow up: in 12 weeks       Zamzam Couch, MS4    Physician Attestation   I, Nora Farnsworth MD, was present with the medical/MELI student who participated in the service and in the documentation of the note.  I have verified the history and personally performed the physical exam and medical decision making.  I agree with the assessment and plan of care as documented in the note.      Items personally reviewed: vitals and notes in the chart and agree with the interpretation documented in the note.    Discussed her response to the initial round of botox. Explained that I am glad that she didn't have side effects and understand no benefits can be disappointing. Discussed that we need at least 3 rounds to optimize the dose and finalize the plan regarding her response to this treatment option. Encouraged her to monitor her tone and functional closely over the next 1-12 weeks. Increased the dose at gastrocnemius and hamstring muscles and added HF muscle.     Nora Farnsworth MD        BOTULINUM NEUROTOXIN INJECTION PROCEDURES:    VERIFICATION OF PATIENT IDENTIFICATION AND PROCEDURE     Initials   Patient Name ps   Patient  ps   Procedure Verified by: ps     Prior to the start of the procedure and with procedural staff participation, I verbally confirmed the patient s identity using two indicators, relevant allergies, that the procedure was appropriate and matched the consent or emergent situation, and that the correct equipment/implants were available. Immediately prior to starting the procedure I conducted the Time Out with the procedural staff and re-confirmed the patient s name, procedure, and site/side. (The Joint Commission universal protocol was followed.)  Yes    Sedation (Moderate or Deep): None      Above assessments performed by:  Medical Student: Zamzam Couch, MS4    Nora Farnsworth MD      INDICATION/S FOR PROCEDURE/S:  Marichuy Navarrete is a 57 year old patient  with spasticity affecting the  left upper extremity and left lower extremity secondary to a diagnosis of MS with associated  pain, spasms, loss of joint motion, loss of volitional motor control, difficulty with activities of daily living and difficulty with ambulation and transfers.     Her baseline symptoms have been recalcitrant to oral medications and conservative therapy.  She is here today for an injection of Botox.      GOAL OF PROCEDURE:  The goal of this procedure is to increase active range of motion, improve volitional motor control and enhance functional independence associated with spasticity.    TOTAL DOSE ADMINISTERED:  Dose Administered:  160 units Botox  1:1 units   Diluent Used:  Preservative Free Normal Saline  Total Volume of Diluent Used:  2 ml  Lot # [id number] with Expiration Date: 04/2024  Lot # [id number] with Expiration Date: 04/2024  NDC #: Botox 100u ([address]-59)    Was there drug waste? Yes  Amount of drug waste (mL): 40 units.  Reason for waste:  Single use vial  Multi-dose vial: Wandy Farnsworth MD  June 23, 2022        CONSENT:  The risks, benefits, and treatment options were discussed with Marichuy Navarrete and she agreed to proceed.      Written consent was obtained by PS.     EQUIPMENT USED:  Needle-35mm stimulating/recording  EMG/NCS Machine    SKIN PREPARATION:  Skin preparation was performed using a chloroprep and alcohol wipe.    GUIDANCE DESCRIPTION:  Electro-myographic guidance was necessary throughout the procedure to accurately identify all areas of spastic muscles while avoiding injection of non-spastic muscles and underlying muscles , neighboring nerves and nearby vascular structures.     AREA/MUSCLE INJECTED:    Left   Gastrocnemius 80 units (50 units laterally, 30 units medially)  Hamstring 60 units (30 units laterally, 30 units medially)  Iliopsoas 20 units    RESPONSE TO PROCEDURE:  Marichuy Navarrete tolerated the procedure well and there were no immediate complications.   She was allowed to recover for an appropriate period of time and was discharged home in stable condition.

## 2022-06-23 NOTE — LETTER
"2022       RE: Marichuy Navarrete  [address] [place] [address] [address]  [facility] [id number]     Dear Colleague,    Thank you for referring your patient, Marichuy Navarrete, to the [facility] PHYSICAL MEDICINE AND REHABILITATION CLINIC [facility]. Please see a copy of my visit note below.           PM&R Clinic Note     Patient Name: Marichuy Navarrete : 1965 Medical Record: [id number]            History of Present Illness:     Marichuy Navarrete is a 57 year old female with h/o multiple sclerosis who was referred to our clinic for management of spasticity. Initial consult on 3/7/22; referral from Dr. Borja.    Background from the consult note  She also has h/o left SI joint fusion (2019) and was referred to Dr. Borja by Dr. Matos for the management of right gluteal/piriformis pain and acute right proximal hamstring tear. She was also referred to MS neurology team to establish care.     Symptoms,  --Pain at left knee, left buttock and poserior thigh area on the left   --weakness in LLE > LUE  --gest spasms in LLE but not often   --no paresthesia   --no issues with bowel or bladder function   --she has no issues with swallowing but recently has had some choking episodes with severe cough     Meds,  --Flexeril - took it soon after it was prescribed but stopped due to no benefits   --Tizanidine - takes 2-4 mg every other night - helps with spasms  - no trial of higher dose   --Gabapentin - new just prescribed - not taking it due to concerns for side effects  --Was on baclofen before but doesn't remember why it was discontinued    Therapies/HEP/equipments,  Worked with PT 2-3 months ago working on \"torn hamstring\" - finished in Nov  Was doing her HEP but not lately    Functionally,   Have not worked since  - stopped working due to multiple sclerosis   Has PCA (her son) who helps with getting in the tub, house chores, laundry and cooking   She is mod I with basic " ADLs and mobility using a SEC  Uses a 4ww for longer distances like walking in a park or grocery store  Drives occasionally   Left hand-dominant      Interval history since 3/28/22  -- Did not feel like last round of Botox injections did anything. Did not have any adverse reaction. Has same tightness which is worse in the morning. Majority of tightness in in left quadriceps area. Did not notice worsening in interim. No pain.  -- No new changes in other symptoms. Strength and swallowing are the same.  -- Currently working with Evelyn in physical therapy.  -- Has new AFO and feels like it helps a lot.  -- No new changes in medications, no new medical issues.           Past Medical and Surgical History:     Past Medical History:   Diagnosis Date     Abnormal gait      Cervical radiculopathy      Hypertension      Lumbar radiculopathy      Multiple sclerosis (H)     LLE weakness     Osteopenia      Other chronic pain     SI joint pain, left sciatica     Pure hypercholesterolemia      Renal disease      Past Surgical History:   Procedure Laterality Date     OPTICAL TRACKING SYSTEM FUSION SACRAL ILIAC Left 3/18/2019    Procedure: STEALTH ASSISTED MINIMALLY INVASIVE SACRAL ILIAC JOINT FUSION LEFT;  Surgeon: Jason Matos MD;  Location:  OR            Social History:     Social History     Tobacco Use     Smoking status: Never Smoker     Smokeless tobacco: Never Used   Substance Use Topics     Alcohol use: Not on file     Lives in an apartment with her sons 21 and 22 yo. They have 6 stairs from the parking lot to enter the building and then 12 to the second floor where her bedroom is located          Functional history:     See HPI           Family History:     No family history on file.           Medications:     Current Outpatient Medications   Medication Sig Dispense Refill     acetaminophen (TYLENOL) 325 MG tablet Take 1-2 tablets (325-650 mg) by mouth every 6 hours as needed for pain 100 tablet 0     amLODIPine  "(NORVASC) 5 MG tablet Take 5 mg by mouth       cholecalciferol 1000 units TABS Take 1,000 Units by mouth        cyclobenzaprine (FLEXERIL) 5 MG tablet Take 1-2 tablets (5-10 mg) by mouth nightly as needed for muscle spasms 60 tablet 1     DULoxetine (CYMBALTA) 30 MG capsule Take 1 capsule by mouth daily       gabapentin (NEURONTIN) 100 MG capsule Take 100 mg by mouth daily       hydrOXYzine (ATARAX) 25 MG tablet Take 1-2 tabs up to 4 times daily as needed for anxiety, or just at bedtime. May make sleepy.       rosuvastatin (CRESTOR) 20 MG tablet Take 20 mg by mouth       SENNA-docusate sodium (SENNA S) 8.6-50 MG tablet Take 1 tablet by mouth 2 times daily as needed (take to prevent constipation while taking narcotic pain medication) (Patient not taking: Reported on 3/28/2022) 30 tablet 0     tiZANidine (ZANAFLEX) 2 MG tablet Take 2-4 mg by mouth              Allergies:     Allergies   Allergen Reactions     Ibuprofen      Other reaction(s): GI Upset              ROS:     A focused ROS is negative other than the symptoms noted above in the HPI.           Physical Examiniation:     VITAL SIGNS: BP (!) 149/94   Pulse 58   Temp 98  F (36.7  C)   Resp 16   SpO2 100%   BMI: Estimated body mass index is 25.66 kg/m  as calculated from the following:    Height as of 3/28/22: 1.676 m (5' 6\").    Weight as of 3/28/22: 72.1 kg (159 lb).    Gen: NAD, pleasant and cooperative   Cardio: No edema in BLE, no tenderness in calves  Pulm: Non-labored breathing in room air and with conversation  Abd: Non-distended  Ext: WWP  Neuro/MSK:   RUE and RLE strength 5/5   LUE 4+/5  L HF 3/5, KF/KE 4/5, DF 1/5 and PF 4/5  One beat clonus at left ankle with dorsiflexion  MAS 2 with hip flexion, 2 with knee extension, increased tone in ankle although exam limited by AFO in place           Laboratory/Imagin2021 MRI lumbar spine ([facility])  Interpretation:  Segment and alignment: Normal segmentation of the lumbar vertebral " elements with a vestigial S1-2 disc which shows normal signal characteristics.  S1 is a nonmobile segment.  Sacrum and sacroiliac joints: Interim left SI joint fusion using a cage assisted minimally invasive technique is apparent.  Limited views of the right sacrum and sacroiliac joint are normal.  Conus/distal cord: Normal signal intensity within the conus medullaris and visualized lower spinal cord is demonstrated.  No intradural mass or arachnoidal adhesions are evident.  Osseous and paraspinous structures: There are no paraspinous soft tissue abnormalities.  L5-S1: Disc signal and contour are normal.  The canal and foramina are widely patent.  L4-L5: There is interim progression of disc degeneration with left paracentral annular fissure, and left foraminal and far lateral broad-based 4 mm protrusion.  There is mild to moderate foraminal stenosis without ganglionic impingement.  The canal remains patent.  L3-L4: Early disc degeneration is evident.  There is subtle annular bulging with a broad-based 4 mm left foraminal and far lateral protrusion, increased in size since the previous exam.  There is crowding but no impingement of the exiting nerve root ganglion.  The right foramen and canal remain patent.  The L2-3, L1-2, and L T12-L1 levels are unremarkable.           Assessment/Plan:       Primary progressive multiple sclerosis     Left spastic hemiparesis     Partial tear of right hamstring. Moderate tendinopathy and moderate grade ill-defined partial tearing of the right semimembranosus tendon at the right ischial tuberosity attachment.    Piriformis syndrome, right    Left SI joint fusion (2019)     1. Patient education: In depth discussion and education was provided about the assessment and implications of each of the below recommendations for management. Patient indicated readiness to learn, all questions were answered and understanding of material presented was confirmed.    2. Work-up: none today      3. Therapy/equipment/braces: continue PT and HEP    4. Medications: continue tizanidine scheduled 2mg tid with additional dose at night time     5. Interventions: second round of Botox injections today with increased dose and addition of iliopsoas. Will consider injecting LUE in future pending her response.     6. Referral / follow up with other providers: no new today     7. Follow up: in 12 weeks       Zamzam Couch, MS4    Physician Attestation   I, Nora Farnsworth MD, was present with the medical/MELI student who participated in the service and in the documentation of the note.  I have verified the history and personally performed the physical exam and medical decision making.  I agree with the assessment and plan of care as documented in the note.      Items personally reviewed: vitals and notes in the chart and agree with the interpretation documented in the note.    Discussed her response to the initial round of botox. Explained that I am glad that she didn't have side effects and understand no benefits can be disappointing. Discussed that we need at least 3 rounds to optimize the dose and finalize the plan regarding her response to this treatment option. Encouraged her to monitor her tone and functional closely over the next 1-12 weeks. Increased the dose at gastrocnemius and hamstring muscles and added HF muscle.     Nora Farnsworth MD        BOTULINUM NEUROTOXIN INJECTION PROCEDURES:    VERIFICATION OF PATIENT IDENTIFICATION AND PROCEDURE     Initials   Patient Name ps   Patient  ps   Procedure Verified by: ps     Prior to the start of the procedure and with procedural staff participation, I verbally confirmed the patient s identity using two indicators, relevant allergies, that the procedure was appropriate and matched the consent or emergent situation, and that the correct equipment/implants were available. Immediately prior to starting the procedure I conducted the Time Out with the procedural  staff and re-confirmed the patient s name, procedure, and site/side. (The Joint Commission universal protocol was followed.)  Yes    Sedation (Moderate or Deep): None      Above assessments performed by:  Medical Student: Zamzam Couch, MS4    Nora Farnsworth MD      INDICATION/S FOR PROCEDURE/S:  Marichuy Navarrete is a 57 year old patient with spasticity affecting the  left upper extremity and left lower extremity secondary to a diagnosis of MS with associated  pain, spasms, loss of joint motion, loss of volitional motor control, difficulty with activities of daily living and difficulty with ambulation and transfers.     Her baseline symptoms have been recalcitrant to oral medications and conservative therapy.  She is here today for an injection of Botox.      GOAL OF PROCEDURE:  The goal of this procedure is to increase active range of motion, improve volitional motor control and enhance functional independence associated with spasticity.    TOTAL DOSE ADMINISTERED:  Dose Administered:  160 units Botox  1:1 units   Diluent Used:  Preservative Free Normal Saline  Total Volume of Diluent Used:  2 ml  Lot # [id number] with Expiration Date: 04/2024  Lot # [id number] with Expiration Date: 04/2024  NDC #: Botox 100u ([address]-32)    Was there drug waste? Yes  Amount of drug waste (mL): 40 units.  Reason for waste:  Single use vial  Multi-dose vial: No    Nora Farnsworth MD  June 23, 2022        CONSENT:  The risks, benefits, and treatment options were discussed with Marichuy Navarrete and she agreed to proceed.      Written consent was obtained by PS.     EQUIPMENT USED:  Needle-35mm stimulating/recording  EMG/NCS Machine    SKIN PREPARATION:  Skin preparation was performed using a chloroprep and alcohol wipe.    GUIDANCE DESCRIPTION:  Electro-myographic guidance was necessary throughout the procedure to accurately identify all areas of spastic muscles while avoiding injection of non-spastic muscles and underlying muscles ,  neighboring nerves and nearby vascular structures.     AREA/MUSCLE INJECTED:    Left   Gastrocnemius 80 units (50 units laterally, 30 units medially)  Hamstring 60 units (30 units laterally, 30 units medially)  Iliopsoas 20 units    RESPONSE TO PROCEDURE:  Marichuy Navarrete tolerated the procedure well and there were no immediate complications.  She was allowed to recover for an appropriate period of time and was discharged home in stable condition.      Sincerely,    Nora Farnsworth MD

## 2022-06-28 ENCOUNTER — HOSPITAL ENCOUNTER (OUTPATIENT)
Dept: PHYSICAL THERAPY | Facility: CLINIC | Age: 57
Setting detail: THERAPIES SERIES
Discharge: HOME | End: 2022-06-28
Attending: PHYSICAL MEDICINE & REHABILITATION
Payer: COMMERCIAL

## 2022-06-28 PROCEDURE — 97116 GAIT TRAINING THERAPY: CPT | Mod: GP | Performed by: PHYSICAL THERAPIST

## 2022-06-28 PROCEDURE — 97530 THERAPEUTIC ACTIVITIES: CPT | Mod: GP | Performed by: PHYSICAL THERAPIST

## 2022-07-12 ENCOUNTER — HOSPITAL ENCOUNTER (OUTPATIENT)
Dept: PHYSICAL THERAPY | Facility: CLINIC | Age: 57
Setting detail: THERAPIES SERIES
Discharge: HOME | End: 2022-07-12
Attending: PHYSICAL MEDICINE & REHABILITATION
Payer: COMMERCIAL

## 2022-07-12 PROCEDURE — 97116 GAIT TRAINING THERAPY: CPT | Mod: GP | Performed by: PHYSICAL THERAPIST

## 2022-07-16 ENCOUNTER — HEALTH MAINTENANCE LETTER (OUTPATIENT)
Age: 57
End: 2022-07-16

## 2022-07-16 NOTE — PROGRESS NOTES
07/12/22 1200   Signing Clinician's Name / Credentials   Signing clinician's name / credentials Evelyn Thurman PT   Session Number   Session Number 11   -[facility] PMAP   Goal 1   Goal Identifier TUG   Goal Description Pt will perform the TUG in <13.5 seconds without an assistive device in order to demonstrate reduced risk of falls and improved efficiency of gait and functional strength.   Goal Progress 5/5/22: Pt performed TUG in an average of 13.5 sec after two trials, with AFO, no AD   Target Date 06/21/22   Date Met 05/05/22   Goal 2   Goal Identifier DGI   Goal Description Pt will score >19/24 on the DGI (from 16) in order to demonstrate reduced risk for falls, improved gait mechanics for stepping over objects in her environment, and demonstrate increased safety in functional mobility.   Goal Progress 5/5/22: Pt performed DGI with score of 20/24, with AFO, no AD   Target Date 06/21/22   Date Met 05/05/22   Goal 3   Goal Identifier 6MWT/Walking   Goal Description Pt will ambulate >774 ft on the 6MWT (20% increase) with a SEC and without changes in L foot drop with fatigue throughout duration in order to demonstrate improved efficiency of gait for the ability to ambulate in her community and work re-integration.   Goal Progress 5/5/22: Pt ambulated 771 ft with AFO, no AD   Target Date 07/29/22   Date Met 07/12/22   Goal 4   Goal Identifier Stairs   Goal Description Pt will ascend/descend 12 stairs with use of single railing and R SEC without catching L foot on step for improved safety, energy conservation, and increased ease in navigating in her apartment.   Target Date 06/21/22   Date Met 05/19/22   Goal 5   Goal Identifier HEP   Goal Description Pt will demo 100% compliance and independence in HEP with consideration of energy conservation in order to improve functional strength and increase quality of life.    Target Date 06/21/22   Date Met 06/28/22   Goal 6   Goal Identifier Tone/Spasticity Management   Goal  Description Pt will verbalize at least 3 strategies to self-manage spasticity in order to maintain carryover into functional mobility, capitalize on effects of Botox injections, and improve quality of life.    Goal Progress pt reports takes her meds as needed.  i could stretch.  2/3.  also cooling.  use ankle brace.   Target Date 06/21/22   Date Met 06/28/22   Subjective Report   Subjective Report pt wants Josue's other afo to use.  gave this to pt.   Objective Measure 1   Objective Measure 25' Walk   Details 9.7 sec   Objective Measure 2   Objective Measure TUG   Details 14.7 sec w/ cane/ afo   Objective Measure 3   Objective Measure DGI   Details 20/24, with AFO   Objective Measure 4   Objective Measure 6MWT   Details 6 min 900ft w/ cane outside   Vitals Signs   Heart Rate 70   Gait Training   Gait Training Minutes, includes stair climbing (34119) 30   Skilled Intervention gait tests, train   Patient Response maxine well   Treatment Detail 6 min walk.  see above.  pt reports has HEP.  has cooling ideas.  raised cane one notch.  steps. x 9 w/ one rail and cane sba to indep forw/back walking in //bars.  floor tx x 2 w/ one hand support on mat.  indep.  all goals met.  states kids do cleaning at home.  she reaches down to floor for items.   Education   Learner Patient   Readiness Eager;Acceptance   Method Explanation;Demonstration   Response Verbalizes Understanding;Demonstrates Understanding   Education Comments HEP   Plan   Homework above   Home program PTRX: addition of sit to stands with R LE forward, walking program, Bridge with ball between legs, squats with B UE support. Step taps with single UE support. Anterior/posterior weight shifts at countertop. Eccentric step down.  added wall calf stretch and seated soleus stretch   Updates to plan of care goals met   Plan for next session d/c.   Total Session Time   Timed Code Treatment Minutes 30   Total Treatment Time (sum of timed and untimed services) 30

## 2022-07-16 NOTE — PROGRESS NOTES
[facility]    Outpatient Physical Therapy Discharge Note  Patient: Marichuy Navarrete  : 1965    Beginning/End Dates of Reporting Period:  3/24/22 to 22    Referring Provider: CORNELL Martinez Diagnosis: gait difficulty     Client Self Report: pt wants Josue's other afo to use.  gave this to pt.    Objective Measurements:  Objective Measure: 25' Walk  Details: 9.7 sec  Objective Measure: TUG  Details: 14.7 sec w/ cane/ afo  Objective Measure: DGI  Details: , with AFO  Objective Measure: 6MWT  Details: 6 min 900ft w/ cane outside          Goals:  Goal Identifier TUG   Goal Description Pt will perform the TUG in <13.5 seconds without an assistive device in order to demonstrate reduced risk of falls and improved efficiency of gait and functional strength.   Target Date 22   Date Met  22   Progress (detail required for progress note): 22: Pt performed TUG in an average of 13.5 sec after two trials, with AFO, no AD     Goal Identifier DGI   Goal Description Pt will score >19/24 on the DGI (from 16) in order to demonstrate reduced risk for falls, improved gait mechanics for stepping over objects in her environment, and demonstrate increased safety in functional mobility.   Target Date 22   Date Met  22   Progress (detail required for progress note): 22: Pt performed DGI with score of 20/24, with AFO, no AD     Goal Identifier 6MWT/Walking   Goal Description Pt will ambulate >774 ft on the 6MWT (20% increase) with a SEC and without changes in L foot drop with fatigue throughout duration in order to demonstrate improved efficiency of gait for the ability to ambulate in her community and work re-integration.   Target Date 22   Date Met  22   Progress (detail required for progress note): 22: Pt ambulated 771 ft with AFO, no AD     Goal Identifier Stairs    Goal Description Pt will ascend/descend 12 stairs with use of single railing and R SEC without catching L foot on step for improved safety, energy conservation, and increased ease in navigating in her apartment.   Target Date 06/21/22   Date Met  05/19/22   Progress (detail required for progress note):       Goal Identifier HEP   Goal Description Pt will demo 100% compliance and independence in HEP with consideration of energy conservation in order to improve functional strength and increase quality of life.    Target Date 06/21/22   Date Met  06/28/22   Progress (detail required for progress note):       Goal Identifier Tone/Spasticity Management   Goal Description Pt will verbalize at least 3 strategies to self-manage spasticity in order to maintain carryover into functional mobility, capitalize on effects of Botox injections, and improve quality of life.    Target Date 06/21/22   Date Met  06/28/22   Progress (detail required for progress note): pt reports takes her meds as needed.  i could stretch.  2/3.  also cooling.  use ankle brace.         Plan:  Discharge from therapy.    Discharge:  yes    Reason for Discharge: Patient has met all goals.    Equipment Issued: cane, afo    Discharge Plan: Patient to continue home program.

## 2022-09-18 ENCOUNTER — HEALTH MAINTENANCE LETTER (OUTPATIENT)
Age: 57
End: 2022-09-18

## 2022-11-03 NOTE — TELEPHONE ENCOUNTER
DIAGNOSIS: possible SI joint fusion on L side / self-referred    APPOINTMENT DATE: 12.1.22   NOTES STATUS DETAILS   OFFICE NOTE from other specialist Internal 7.28.21 Dr Jason Matos, [facility]  5.20.21  2.11.21  6.12.19  11.14.18   OPERATIVE REPORT Internal 3.18.19 STEALTH ASSISTED MINIMALLY INVASIVE SACRAL ILIAC JOINT FUSION LEFT   MEDICATION LIST Internal    LABS     CBC/DIFF Care Everywhere    MRI PACS 7.23.21 pelvis, [facility]  1.19.21 pelvis, lumbar spine, [facility]  3.5.20 pelvis, [facility]  11.20.13 lumbar spine, Noran   XRAYS (IMAGES & REPORTS) Internal 7.28.21 pelvis  5.20.21 pelvis  2.11.21 pelvis  6.12.19 pelvis  11.14.18 pelvis, sacrum and coccyx     Action 11.3.22 7:22 AM ЕКАТЕРИНА   Action Taken Called [facility] for missing MRI image 3.5.20 pelvis

## 2022-11-28 DIAGNOSIS — M54.50 LOW BACK PAIN: Primary | ICD-10-CM

## 2022-11-28 DIAGNOSIS — Z98.1 HX OF SPINAL FUSION: ICD-10-CM

## 2022-12-01 ENCOUNTER — ANCILLARY PROCEDURE (OUTPATIENT)
Dept: GENERAL RADIOLOGY | Facility: CLINIC | Age: 57
End: 2022-12-01
Attending: ORTHOPAEDIC SURGERY
Payer: COMMERCIAL

## 2022-12-01 ENCOUNTER — OFFICE VISIT (OUTPATIENT)
Dept: ORTHOPEDICS | Facility: CLINIC | Age: 57
End: 2022-12-01
Payer: COMMERCIAL

## 2022-12-01 ENCOUNTER — PRE VISIT (OUTPATIENT)
Dept: ORTHOPEDICS | Facility: CLINIC | Age: 57
End: 2022-12-01

## 2022-12-01 VITALS — WEIGHT: 159 LBS | HEIGHT: 66 IN | BODY MASS INDEX: 25.55 KG/M2

## 2022-12-01 DIAGNOSIS — M54.50 LOW BACK PAIN: ICD-10-CM

## 2022-12-01 DIAGNOSIS — Z98.1 HX OF SPINAL FUSION: ICD-10-CM

## 2022-12-01 DIAGNOSIS — S76.011A: ICD-10-CM

## 2022-12-01 DIAGNOSIS — G57.01 PIRIFORMIS SYNDROME, RIGHT: Primary | ICD-10-CM

## 2022-12-01 DIAGNOSIS — S76.311S STRAIN OF RIGHT HAMSTRING MUSCLE, SEQUELA: ICD-10-CM

## 2022-12-01 PROCEDURE — 77073 BONE LENGTH STUDIES: CPT | Performed by: STUDENT IN AN ORGANIZED HEALTH CARE EDUCATION/TRAINING PROGRAM

## 2022-12-01 PROCEDURE — 72170 X-RAY EXAM OF PELVIS: CPT | Mod: GC | Performed by: RADIOLOGY

## 2022-12-01 PROCEDURE — 72082 X-RAY EXAM ENTIRE SPI 2/3 VW: CPT | Performed by: STUDENT IN AN ORGANIZED HEALTH CARE EDUCATION/TRAINING PROGRAM

## 2022-12-01 PROCEDURE — 99213 OFFICE O/P EST LOW 20 MIN: CPT | Performed by: ORTHOPAEDIC SURGERY

## 2022-12-01 ASSESSMENT — ENCOUNTER SYMPTOMS
PANIC: 0
NERVOUS/ANXIOUS: 0
DEPRESSION: 0
INSOMNIA: 1
DECREASED CONCENTRATION: 0

## 2022-12-01 NOTE — TELEPHONE ENCOUNTER
DIAGNOSIS: right leg pain    APPOINTMENT DATE: 12.13.22   NOTES STATUS DETAILS   OFFICE NOTE from referring provider Internal 12.1.22 Vilma   OFFICE NOTE from other specialist Internal 8.18.21 Huong    7.28.21 Shawna    PT in epic   MEDICATION LIST Internal

## 2022-12-01 NOTE — PROGRESS NOTES
REASON FOR CONSULTATION: No chief complaint on file.     REFERRING PHYSICIAN: Matias Roach     PRIMARY CARE PHYSICIAN: Zamzam Oconnell    HISTORY OF PRESENT ILLNESS: Marichuy Navarrete is a 57 year old female with significant PMH of multiple sclerosis with chronic LLE spasticity who presents for evaluation of low back pain. She has history of 3/18/2019 MIS left SI joint fusion with Dr. Matos which did help her. Dr. Matos last saw patient 7/28/2021: diagnosis of right hamstring tendon origin tear; recommended PT and non-op treatment with Dr. Borja.     Today, Marichuy reports that she did have improvement in her right hamstring tear symptoms after some PT and time. However, she has had worsening of symptoms over the past few months. She did not have a fall or injury, but states that she did a lot of stair walking while moving recently. Pain is localized to right low back/ buttock, radiating to lateral thigh and calf. No significant left leg symptoms. Pain is worse with standing (can only stand 3-5 minutes), walking, and especially with trying to go up stairs. Pain also present when sitting for too long or laying down. Denies leg weakness in right leg; has chronic issues with left leg due to MS. She denies bowel/bladder changes. She has been using ice and tylenol for symptomatic control..     Oswestry Disability Score: 73.33  Pain Scale: 9 right low back and leg      Past medical, past surgical, social, family history, medications, and allergies reviewed on the orthopaedic surgery intake form which is scanned into the electronic record with pertinent positives commented on.    Allergies   Allergen Reactions     Ibuprofen      Other reaction(s): GI Upset       Past Medical History:   Diagnosis Date     Abnormal gait      Cervical radiculopathy      Hypertension      Lumbar radiculopathy      Multiple sclerosis (H)     LLE weakness     Osteopenia      Other chronic pain     SI joint pain, left sciatica     Pure  hypercholesterolemia      Renal disease        Past Surgical History:   Procedure Laterality Date     OPTICAL TRACKING SYSTEM FUSION SACRAL ILIAC Left 3/18/2019    Procedure: STEALTH ASSISTED MINIMALLY INVASIVE SACRAL ILIAC JOINT FUSION LEFT;  Surgeon: Jason Matos MD;  Location: [facility]       No family history on file.    Social History     Tobacco Use     Smoking status: Never     Smokeless tobacco: Never   Substance Use Topics     Alcohol use: Not on file       Current Outpatient Medications   Medication     acetaminophen (TYLENOL) 325 MG tablet     amLODIPine (NORVASC) 5 MG tablet     cholecalciferol 1000 units TABS     cyclobenzaprine (FLEXERIL) 5 MG tablet     DULoxetine (CYMBALTA) 30 MG capsule     gabapentin (NEURONTIN) 100 MG capsule     hydrOXYzine (ATARAX) 25 MG tablet     rosuvastatin (CRESTOR) 20 MG tablet     SENNA-docusate sodium (SENNA S) 8.6-50 MG tablet     tiZANidine (ZANAFLEX) 2 MG tablet     Current Facility-Administered Medications   Medication     botulinum toxin type A (BOTOX) 100 units injection 400 Units     lidocaine (PF) (XYLOCAINE) 1 % injection 6 mL     triamcinolone (KENALOG-40) injection 40 mg              Physical Exam     Constitutional - Patient is healthy, well-nourished and appears stated age.    BMI = 25.55    Respiratory - Patient is breathing normally and in no respiratory distress.   Skin - No suspicious rashes or lesions.     Psychiatric - Normal mood and affect.   Cardiovascular - Peripheral pulses are normal.   Eyes - Visual acuity is normal to the written word.   ENT - Hearing intact to the spoken word.   GI - No abdominal distention.   Musculoskeletal - antalgic gait without use of assistive devices.          Thoracic Spine:    Appearance - Normal    Palpation - Non -tender to palpation    Strength/ROM - deferred            Lumbar Spine:    Appearance - Normal . Localizes pain to right low back radiating into right glute, right lateral thigh, right calf    Palpation  - Non-tender to palpation midline, Left PSIS, bilateral paraspinals. Tender to palpation right PSIS. Very tender to palpation of right piriformis muscle. Right iliac crest and right greater trochanter    Motor -        LOWER EXTREMITY Left Right   Hip flexion 3/5 5/5   Knee flexion 4/5 5/5   Knee extension 4/5 5/5   Ankle dorsiflexion 4/5 5/5   Ankle plantarflexion 4/5 5/5   Great toe extension 3/5 5/5       Special tests -     Straight leg raise - negative     EDY - positive for buttock and lateral hip pain on right     Pain with hip ROM - positive for buttock and lateral hip pain on right     Piriformis stretch - positive right     Facet loading - negative     Neurologic - Sensation intact to light touch bilaterally.      REFLEXES Left Right                  Patella 3+ 2+   Ankle jerk 3+ 2+   Babinski  clonus upgoing  1 beats downgoing  0 beats       SI provocation tests:    Right Left   Mejia Finger Test  - -   EDY  - -   Thigh Thrust: - -   Pelvic Compression Test  - -   Palpation  + -   Pelvic Gapping  - -   Gaenslen s Test  - -   Sacral Thrust (SI) - -           Imagin/1/22 EOS full spine standing AP/lateral view XR and XR pelvis dillon view: stable L SI joint fusion hardware without loosening. Neutral coronal/ sagittal alignment. No significant leg length discrepancy or hip degenerative changes.             Clinical Assessment:     1. Right gluteus angie pain and piriformis syndrome  2.  3 years s/p Left MIS SI fusion, asymptomatic  3. Hx of right hamstring tendon origin tear , treated with PT           Discussion/ Plan   Reviewed today's XR images which demonstrate neutral spine alignment and stable Left SI fusion hardware. Based on exam findings today, 0/5 positive SI provocative maneuvers, it is unlikely that the right SI joint is the source of her current pain. We would not recommend right SI fusion at this time. Her pain pattern better matches with gluteus angie muscle pain and  piriformis syndrome. This is possibly due to history of right hamstring tendon tear +/- compensation due to left leg weakness. Recommend non-operative treatment for this; we will send her to sports medicine team for treatment.    - referral to sports medicine for treatment of gluteus angie and piriformis muscle spasm pain  - follow up with Dr. Shawna CHERRY    All questions and concerns were answered to the patient's apparent satisfaction before leaving the clinic.     Thank you for allowing us to see this pleasant patient.  Dr. Matos and I are happy to be involved in her care.    Respectfully,  Ana Esparza (alvaro Donnelly)LAUREN    I saw and evaluated the patient and developed the plan.  Jason Matos MD      CC  Copy to patient   [address]  [facility] [address]

## 2022-12-01 NOTE — LETTER
12/1/2022         RE: Marichuy Navarrete  [address] [place] [address] [address]  [facility] [address]        Dear Colleague,    Thank you for referring your patient, Marichuy Navarrete, to the [facility] [place]. Please see a copy of my visit note below.    REASON FOR CONSULTATION: No chief complaint on file.     REFERRING PHYSICIAN: Matias Roach     PRIMARY CARE PHYSICIAN: Zamzam Oconnell    HISTORY OF PRESENT ILLNESS: Marichuy Navarrete is a 57 year old female with significant PMH of multiple sclerosis with chronic LLE spasticity who presents for evaluation of low back pain. She has history of 3/18/2019 MIS left SI joint fusion with Dr. Matos which did help her. Dr. Matos last saw patient 7/28/2021: diagnosis of right hamstring tendon origin tear; recommended PT and non-op treatment with Dr. Borja.     Today, Marichuy reports that she did have improvement in her right hamstring tear symptoms after some PT and time. However, she has had worsening of symptoms over the past few months. She did not have a fall or injury, but states that she did a lot of stair walking while moving recently. Pain is localized to right low back/ buttock, radiating to lateral thigh and calf. No significant left leg symptoms. Pain is worse with standing (can only stand 3-5 minutes), walking, and especially with trying to go up stairs. Pain also present when sitting for too long or laying down. Denies leg weakness in right leg; has chronic issues with left leg due to MS. She denies bowel/bladder changes. She has been using ice and tylenol for symptomatic control..     Oswestry Disability Score: 73.33  Pain Scale: 9 right low back and leg      Past medical, past surgical, social, family history, medications, and allergies reviewed on the orthopaedic surgery intake form which is scanned into the electronic record with pertinent positives commented on.    Allergies   Allergen Reactions     Ibuprofen      Other reaction(s): GI Upset        Past Medical History:   Diagnosis Date     Abnormal gait      Cervical radiculopathy      Hypertension      Lumbar radiculopathy      Multiple sclerosis (H)     LLE weakness     Osteopenia      Other chronic pain     SI joint pain, left sciatica     Pure hypercholesterolemia      Renal disease        Past Surgical History:   Procedure Laterality Date     OPTICAL TRACKING SYSTEM FUSION SACRAL ILIAC Left 3/18/2019    Procedure: STEALTH ASSISTED MINIMALLY INVASIVE SACRAL ILIAC JOINT FUSION LEFT;  Surgeon: Jason Matos MD;  Location: [facility]       No family history on file.    Social History     Tobacco Use     Smoking status: Never     Smokeless tobacco: Never   Substance Use Topics     Alcohol use: Not on file       Current Outpatient Medications   Medication     acetaminophen (TYLENOL) 325 MG tablet     amLODIPine (NORVASC) 5 MG tablet     cholecalciferol 1000 units TABS     cyclobenzaprine (FLEXERIL) 5 MG tablet     DULoxetine (CYMBALTA) 30 MG capsule     gabapentin (NEURONTIN) 100 MG capsule     hydrOXYzine (ATARAX) 25 MG tablet     rosuvastatin (CRESTOR) 20 MG tablet     SENNA-docusate sodium (SENNA S) 8.6-50 MG tablet     tiZANidine (ZANAFLEX) 2 MG tablet     Current Facility-Administered Medications   Medication     botulinum toxin type A (BOTOX) 100 units injection 400 Units     lidocaine (PF) (XYLOCAINE) 1 % injection 6 mL     triamcinolone (KENALOG-40) injection 40 mg              Physical Exam     Constitutional - Patient is healthy, well-nourished and appears stated age.    BMI = 25.55    Respiratory - Patient is breathing normally and in no respiratory distress.   Skin - No suspicious rashes or lesions.     Psychiatric - Normal mood and affect.   Cardiovascular - Peripheral pulses are normal.   Eyes - Visual acuity is normal to the written word.   ENT - Hearing intact to the spoken word.   GI - No abdominal distention.   Musculoskeletal - antalgic gait without use of assistive devices.           Thoracic Spine:    Appearance - Normal    Palpation - Non -tender to palpation    Strength/ROM - deferred            Lumbar Spine:    Appearance - Normal . Localizes pain to right low back radiating into right glute, right lateral thigh, right calf    Palpation - Non-tender to palpation midline, Left PSIS, bilateral paraspinals. Tender to palpation right PSIS. Very tender to palpation of right piriformis muscle. Right iliac crest and right greater trochanter    Motor -        LOWER EXTREMITY Left Right   Hip flexion 3/5 5/5   Knee flexion 4/5 5/5   Knee extension 4/5 5/5   Ankle dorsiflexion 4/5 5/5   Ankle plantarflexion 4/5 5/5   Great toe extension 3/5 5/5       Special tests -     Straight leg raise - negative     EDY - positive for buttock and lateral hip pain on right     Pain with hip ROM - positive for buttock and lateral hip pain on right     Piriformis stretch - positive right     Facet loading - negative     Neurologic - Sensation intact to light touch bilaterally.      REFLEXES Left Right                  Patella 3+ 2+   Ankle jerk 3+ 2+   Babinski  clonus upgoing  1 beats downgoing  0 beats       SI provocation tests:    Right Left   Mejia Finger Test  - -   EDY  - -   Thigh Thrust: - -   Pelvic Compression Test  - -   Palpation  + -   Pelvic Gapping  - -   Gaenslen s Test  - -   Sacral Thrust (SI) - -           Imagin/1/22 EOS full spine standing AP/lateral view XR and XR pelvis dillon view: stable L SI joint fusion hardware without loosening. Neutral coronal/ sagittal alignment. No significant leg length discrepancy or hip degenerative changes.             Clinical Assessment:     1. Right gluteus angie pain and piriformis syndrome  2.  3 years s/p Left MIS SI fusion, asymptomatic  3. Hx of right hamstring tendon origin tear , treated with PT           Discussion/ Plan   Reviewed today's XR images which demonstrate neutral spine alignment and stable Left SI fusion hardware. Based  on exam findings today, 0/5 positive SI provocative maneuvers, it is unlikely that the right SI joint is the source of her current pain. We would not recommend right SI fusion at this time. Her pain pattern better matches with gluteus angie muscle pain and piriformis syndrome. This is possibly due to history of right hamstring tendon tear +/- compensation due to left leg weakness. Recommend non-operative treatment for this; we will send her to sports medicine team for treatment.    - referral to sports medicine for treatment of gluteus angie and piriformis muscle spasm pain  - follow up with Dr. Shawna CHERRY    All questions and concerns were answered to the patient's apparent satisfaction before leaving the clinic.     Thank you for allowing us to see this pleasant patient.  Dr. Matos and I are happy to be involved in her care.    Respectfully,  nAa Esparza (alvaro Donnelly)LAUREN    I saw and evaluated the patient and developed the plan.  Jason Matos MD      CC  Copy to patient   [address]  [facility] [address]

## 2022-12-01 NOTE — NURSING NOTE
"Reason For Visit:   Chief Complaint   Patient presents with     Consult     possible SI joint fusion on L side        Primary MD: Zamzam Oconnell  Ref. MD: Dr. Oconnell    ?  No  Occupation not working.    Date of injury: No  Type of injury: No.    Date of surgery: 4 years ago  Type of surgery: Left Si joint fusion.    Smoker: No  Request smoking cessation information: No    Ht 1.68 m (5' 6.14\")   Wt 72.1 kg (159 lb)   BMI 25.55 kg/m      Pain Assessment  Patient Currently in Pain: Yes  0-10 Pain Scale: 9  Primary Pain Location: Back    Oswestry (MATHEW) Questionnaire    OSWESTRY DISABILITY INDEX 12/1/2022   Count 9   Sum 33   Oswestry Score (%) 73.33        Visual Analog Pain Scale  Back Pain Scale 0-10: 9  Right leg pain: 9  Left leg pain: 0  Neck Pain Scale 0-10: 0  Right arm pain: 0  Left arm pain: 0    Promis 10 Assessment    PROMIS 10 12/1/2022   In general, would you say your health is: Good   In general, would you say your quality of life is: Fair   In general, how would you rate your physical health? Fair   In general, how would you rate your mental health, including your mood and your ability to think? Good   In general, how would you rate your satisfaction with your social activities and relationships? Poor   In general, please rate how well you carry out your usual social activities and roles Good   To what extent are you able to carry out your everyday physical activities such as walking, climbing stairs, carrying groceries, or moving a chair? A little   How often have you been bothered by emotional problems such as feeling anxious, depressed or irritable? Rarely   How would you rate your fatigue on average? Moderate   How would you rate your pain on average?   0 = No Pain  to  10 = Worst Imaginable Pain 10   In general, would you say your health is: 3   In general, would you say your quality of life is: 2   In general, how would you rate your physical health? 2   In general, how would you rate " your mental health, including your mood and your ability to think? 3   In general, how would you rate your satisfaction with your social activities and relationships? 1   In general, please rate how well you carry out your usual social activities and roles. (This includes activities at home, at work and in your community, and responsibilities as a parent, child, spouse, employee, friend, etc.) 3   To what extent are you able to carry out your everyday physical activities such as walking, climbing stairs, carrying groceries, or moving a chair? 2   In the past 7 days, how often have you been bothered by emotional problems such as feeling anxious, depressed, or irritable? 2   In the past 7 days, how would you rate your fatigue on average? 3   In the past 7 days, how would you rate your pain on average, where 0 means no pain, and 10 means worst imaginable pain? 10   Global Mental Health Score 10   Global Physical Health Score 8   PROMIS TOTAL - SUBSCORES 18   Some recent data might be hidden                Tamika Celestin LPN

## 2022-12-12 NOTE — PROGRESS NOTES
ASSESSMENT/PLAN:    (M25.551) Greater trochanteric pain syndrome of right lower extremity  (primary encounter diagnosis)  Comment: exam consistent w/ greater trochanter pain and SI pain; no radicular component or hamstring component noted on exam today; discussed option of injection to trochanter today +/- guided SI injection vs physical therapy; she elected PT; f/u in 6 wks; precautions given  Plan: Physical Therapy Referral          (M53.3,  G89.29) Chronic right sacroiliac pain  Comment: see above  Plan: Physical Therapy Referral          Attestation:  This patient has been seen and evaluated by me, Ramírez Streeter MD with the resident, Dr Zamzam Marsh and the care team. I agree with the findings and plan of care as documented in this note.    Ramírez Streeter MD  December 13, 2022  9:39 AM        Pt is a 57 year old female referred by Dr Matos here today for:     Right Hip pain :   Location? Right buttock, down the right hamstring, and the lateral thigh down to above the knee. No anterior pain or groin pain  Duration? Two-three months  Injury/ Inciting activity? No. Did have a torn right hamstring, July 2021. Improved by November of 2021. She moved in June to an apartment with a lot of stairs; she thinks that repetitive stairs aggravated her right hip.   Pop? No    Swelling/Bruising? No   Limited motion? No   Snapping/ Clicking? No    Giving way/ instability? No    Numbness/Tingling? No   Imaging? X-ray    Treatment? Feels improved with copper fit belt brace. Also uses ice. Uses tylenol which doesn't help much.     X-ray 12/1/22:  Findings:   Single supine outlet radiograph of the pelvis.      Postoperative changes of left sacroiliac joint fusion and  instrumentation. No evidence of hardware complication. No substantial  degenerative changes right sacroiliac joint.   No acute fracture. Limited evaluation of the sacrum and innominate  bones due to overlying bowel gas/fecal contents.   Hip joint spaces are preserved.  Mild enthesopathic change left iliac crest.                                                                    Impression:   Postoperative changes of left sacroiliac joint fusion instrumentation  without evidence of hardware complication.     Per Dr Matos's note on 12/1/22:  HISTORY OF PRESENT ILLNESS: Marichuy Navarrete is a 57 year old female with significant PMH of multiple sclerosis with chronic LLE spasticity who presents for evaluation of low back pain. She has history of 3/18/2019 MIS left SI joint fusion with Dr. Matos which did help her. Dr. Matos last saw patient 7/28/2021: diagnosis of right hamstring tendon origin tear; recommended PT and non-op treatment with Dr. Borja.      Today, Marichuy reports that she did have improvement in her right hamstring tear symptoms after some PT and time. However, she has had worsening of symptoms over the past few months. She did not have a fall or injury, but states that she did a lot of stair walking while moving recently. Pain is localized to right low back/ buttock, radiating to lateral thigh and calf. No significant left leg symptoms. Pain is worse with standing (can only stand 3-5 minutes), walking, and especially with trying to go up stairs. Pain also present when sitting for too long or laying down. Denies leg weakness in right leg; has chronic issues with left leg due to MS. She denies bowel/bladder changes. She has been using ice and tylenol for symptomatic control..   A:  1. Right gluteus angie pain and piriformis syndrome  2.  3 years s/p Left MIS SI fusion, asymptomatic  3. Hx of right hamstring tendon origin tear 2021, treated with PT  P:  Reviewed today's XR images which demonstrate neutral spine alignment and stable Left SI fusion hardware. Based on exam findings today, 0/5 positive SI provocative maneuvers, it is unlikely that the right SI joint is the source of her current pain. We would not recommend right SI fusion at this time. Her pain pattern better  matches with gluteus angie muscle pain and piriformis syndrome. This is possibly due to history of right hamstring tendon tear +/- compensation due to left leg weakness. Recommend non-operative treatment for this; we will send her to sports medicine team for treatment.     - referral to sports medicine for treatment of gluteus angie and piriformis muscle spasm pain  - follow up with Dr. Shawna CHERRY      Past Medical History:   Diagnosis Date     Abnormal gait      Cervical radiculopathy      Hypertension      Lumbar radiculopathy      Multiple sclerosis (H)     LLE weakness     Osteopenia      Other chronic pain     SI joint pain, left sciatica     Pure hypercholesterolemia      Renal disease       Past Surgical History:   Procedure Laterality Date     OPTICAL TRACKING SYSTEM FUSION SACRAL ILIAC Left 3/18/2019    Procedure: STEALTH ASSISTED MINIMALLY INVASIVE SACRAL ILIAC JOINT FUSION LEFT;  Surgeon: Jason Matos MD;  Location: [facility] OR      Current Outpatient Medications   Medication Sig Dispense Refill     acetaminophen (TYLENOL) 325 MG tablet Take 1-2 tablets (325-650 mg) by mouth every 6 hours as needed for pain 100 tablet 0     amLODIPine (NORVASC) 5 MG tablet Take 5 mg by mouth       cholecalciferol 1000 units TABS Take 1,000 Units by mouth        cyclobenzaprine (FLEXERIL) 5 MG tablet Take 1-2 tablets (5-10 mg) by mouth nightly as needed for muscle spasms 60 tablet 1     DULoxetine (CYMBALTA) 30 MG capsule Take 1 capsule by mouth daily       gabapentin (NEURONTIN) 100 MG capsule Take 100 mg by mouth daily       hydrOXYzine (ATARAX) 25 MG tablet Take 1-2 tabs up to 4 times daily as needed for anxiety, or just at bedtime. May make sleepy.       rosuvastatin (CRESTOR) 20 MG tablet Take 20 mg by mouth       tiZANidine (ZANAFLEX) 2 MG tablet Take 2-4 mg by mouth       SENNA-docusate sodium (SENNA S) 8.6-50 MG tablet Take 1 tablet by mouth 2 times daily as needed (take to prevent constipation while taking  narcotic pain medication) (Patient not taking: Reported on 3/28/2022) 30 tablet 0      Allergies   Allergen Reactions     Ibuprofen      Other reaction(s): GI Upset      ROS:   Gen- no fevers/chills   Rheum - no morning stiffness   Derm - no rash/ redness   Neuro - see HPI  Remainder of ROS negative.     Exam:   There were no vitals taken for this visit.       R Hip:   Inspection: Swelling - No; Bruising - NO  ROM: Flexion -full; Extension - full; ER - ful; IR -limited; Abduction -full; Adduction full; Full lumbar flexion/ extension/rotation as well; R side bend causes pain  Strength: Full in all planes; No pain w/ resisted motion  Tenderness: Trochanter - YES ASIS - NO; Inguinal Ligament - NO; Pubic Symphysis - NO; Ischial Tuberosity- NO; Hamstring - NO; SI Joint - YES on R; +TTP at R iliac crest as well   Maneuvers: EDY - Neg; FADIR - POS for lateral pain; Grind - Neg SI joint - POS; Trendelenburg - POS

## 2022-12-13 ENCOUNTER — PRE VISIT (OUTPATIENT)
Dept: ORTHOPEDICS | Facility: CLINIC | Age: 57
End: 2022-12-13

## 2022-12-13 ENCOUNTER — OFFICE VISIT (OUTPATIENT)
Dept: ORTHOPEDICS | Facility: CLINIC | Age: 57
End: 2022-12-13
Payer: COMMERCIAL

## 2022-12-13 DIAGNOSIS — M25.551 GREATER TROCHANTERIC PAIN SYNDROME OF RIGHT LOWER EXTREMITY: Primary | ICD-10-CM

## 2022-12-13 DIAGNOSIS — M53.3 CHRONIC RIGHT SACROILIAC PAIN: ICD-10-CM

## 2022-12-13 DIAGNOSIS — G89.29 CHRONIC RIGHT SACROILIAC PAIN: ICD-10-CM

## 2022-12-13 PROCEDURE — 99204 OFFICE O/P NEW MOD 45 MIN: CPT | Mod: GC | Performed by: FAMILY MEDICINE

## 2022-12-13 NOTE — LETTER
12/13/2022      RE: Marichuy Navarrete  [address] [place] [address] [address]  [facility] [address]     Dear Colleague,    Thank you for referring your patient, Marichuy Navarrete, to the [facility] [place]. Please see a copy of my visit note below.    ASSESSMENT/PLAN:    (A17.248) Greater trochanteric pain syndrome of right lower extremity  (primary encounter diagnosis)  Comment: exam consistent w/ greater trochanter pain and SI pain; no radicular component or hamstring component noted on exam today; discussed option of injection to trochanter today +/- guided SI injection vs physical therapy; she elected PT; f/u in 6 wks; precautions given  Plan: Physical Therapy Referral          (M53.3,  G89.29) Chronic right sacroiliac pain  Comment: see above  Plan: Physical Therapy Referral          Attestation:  This patient has been seen and evaluated by me, Ramírez Streeter MD with the resident, Dr Zamzam Marsh and the care team. I agree with the findings and plan of care as documented in this note.    Ramírez Streeter MD  December 13, 2022  9:39 AM        Pt is a 57 year old female referred by Dr Matos here today for:     Right Hip pain :   Location? Right buttock, down the right hamstring, and the lateral thigh down to above the knee. No anterior pain or groin pain  Duration? Two-three months  Injury/ Inciting activity? No. Did have a torn right hamstring, July 2021. Improved by November of 2021. She moved in June to an apartment with a lot of stairs; she thinks that repetitive stairs aggravated her right hip.   Pop? No    Swelling/Bruising? No   Limited motion? No   Snapping/ Clicking? No    Giving way/ instability? No    Numbness/Tingling? No   Imaging? X-ray    Treatment? Feels improved with copper fit belt brace. Also uses ice. Uses tylenol which doesn't help much.     X-ray 12/1/22:  Findings:   Single supine outlet radiograph of the pelvis.      Postoperative changes of left sacroiliac joint fusion  and  instrumentation. No evidence of hardware complication. No substantial  degenerative changes right sacroiliac joint.   No acute fracture. Limited evaluation of the sacrum and innominate  bones due to overlying bowel gas/fecal contents.   Hip joint spaces are preserved. Mild enthesopathic change left iliac crest.                                                                    Impression:   Postoperative changes of left sacroiliac joint fusion instrumentation  without evidence of hardware complication.     Per Dr Matos's note on 12/1/22:  HISTORY OF PRESENT ILLNESS: Marichuy Navarrete is a 57 year old female with significant PMH of multiple sclerosis with chronic LLE spasticity who presents for evaluation of low back pain. She has history of 3/18/2019 MIS left SI joint fusion with Dr. Matos which did help her. Dr. Matos last saw patient 7/28/2021: diagnosis of right hamstring tendon origin tear; recommended PT and non-op treatment with Dr. Borja.      Today, Marichuy reports that she did have improvement in her right hamstring tear symptoms after some PT and time. However, she has had worsening of symptoms over the past few months. She did not have a fall or injury, but states that she did a lot of stair walking while moving recently. Pain is localized to right low back/ buttock, radiating to lateral thigh and calf. No significant left leg symptoms. Pain is worse with standing (can only stand 3-5 minutes), walking, and especially with trying to go up stairs. Pain also present when sitting for too long or laying down. Denies leg weakness in right leg; has chronic issues with left leg due to MS. She denies bowel/bladder changes. She has been using ice and tylenol for symptomatic control..   A:  1. Right gluteus angie pain and piriformis syndrome  2.  3 years s/p Left MIS SI fusion, asymptomatic  3. Hx of right hamstring tendon origin tear 2021, treated with PT  P:  Reviewed today's XR images which demonstrate  neutral spine alignment and stable Left SI fusion hardware. Based on exam findings today, 0/5 positive SI provocative maneuvers, it is unlikely that the right SI joint is the source of her current pain. We would not recommend right SI fusion at this time. Her pain pattern better matches with gluteus angie muscle pain and piriformis syndrome. This is possibly due to history of right hamstring tendon tear +/- compensation due to left leg weakness. Recommend non-operative treatment for this; we will send her to sports medicine team for treatment.     - referral to sports medicine for treatment of gluteus angie and piriformis muscle spasm pain  - follow up with Dr. Shawna CHERRY      Past Medical History:   Diagnosis Date     Abnormal gait      Cervical radiculopathy      Hypertension      Lumbar radiculopathy      Multiple sclerosis (H)     LLE weakness     Osteopenia      Other chronic pain     SI joint pain, left sciatica     Pure hypercholesterolemia      Renal disease       Past Surgical History:   Procedure Laterality Date     OPTICAL TRACKING SYSTEM FUSION SACRAL ILIAC Left 3/18/2019    Procedure: STEALTH ASSISTED MINIMALLY INVASIVE SACRAL ILIAC JOINT FUSION LEFT;  Surgeon: Jason Matos MD;  Location: [facility] OR      Current Outpatient Medications   Medication Sig Dispense Refill     acetaminophen (TYLENOL) 325 MG tablet Take 1-2 tablets (325-650 mg) by mouth every 6 hours as needed for pain 100 tablet 0     amLODIPine (NORVASC) 5 MG tablet Take 5 mg by mouth       cholecalciferol 1000 units TABS Take 1,000 Units by mouth        cyclobenzaprine (FLEXERIL) 5 MG tablet Take 1-2 tablets (5-10 mg) by mouth nightly as needed for muscle spasms 60 tablet 1     DULoxetine (CYMBALTA) 30 MG capsule Take 1 capsule by mouth daily       gabapentin (NEURONTIN) 100 MG capsule Take 100 mg by mouth daily       hydrOXYzine (ATARAX) 25 MG tablet Take 1-2 tabs up to 4 times daily as needed for anxiety, or just at bedtime. May make  sleepy.       rosuvastatin (CRESTOR) 20 MG tablet Take 20 mg by mouth       tiZANidine (ZANAFLEX) 2 MG tablet Take 2-4 mg by mouth       SENNA-docusate sodium (SENNA S) 8.6-50 MG tablet Take 1 tablet by mouth 2 times daily as needed (take to prevent constipation while taking narcotic pain medication) (Patient not taking: Reported on 3/28/2022) 30 tablet 0      Allergies   Allergen Reactions     Ibuprofen      Other reaction(s): GI Upset      ROS:   Gen- no fevers/chills   Rheum - no morning stiffness   Derm - no rash/ redness   Neuro - see HPI  Remainder of ROS negative.     Exam:   There were no vitals taken for this visit.       R Hip:   Inspection: Swelling - No; Bruising - NO  ROM: Flexion -full; Extension - full; ER - ful; IR -limited; Abduction -full; Adduction full; Full lumbar flexion/ extension/rotation as well; R side bend causes pain  Strength: Full in all planes; No pain w/ resisted motion  Tenderness: Trochanter - YES ASIS - NO; Inguinal Ligament - NO; Pubic Symphysis - NO; Ischial Tuberosity- NO; Hamstring - NO; SI Joint - YES on R; +TTP at R iliac crest as well   Maneuvers: EDY - Neg; FADIR - POS for lateral pain; Grind - Neg SI joint - POS; Trendelenburg - POS          Again, thank you for allowing me to participate in the care of your patient.      Sincerely,    Ramírez Streeter MD

## 2022-12-13 NOTE — PATIENT INSTRUCTIONS
Please increase your Tylenol to ONE tablet of Tylenol arthritis (650mg per tab) UP TO 4 TIMES A DAY as needed for pain

## 2022-12-27 ENCOUNTER — THERAPY VISIT (OUTPATIENT)
Dept: PHYSICAL THERAPY | Facility: CLINIC | Age: 57
End: 2022-12-27
Payer: COMMERCIAL

## 2022-12-27 DIAGNOSIS — M25.551 HIP PAIN, RIGHT: ICD-10-CM

## 2022-12-27 PROCEDURE — 97161 PT EVAL LOW COMPLEX 20 MIN: CPT | Mod: GP | Performed by: PHYSICAL THERAPIST

## 2022-12-27 PROCEDURE — 97110 THERAPEUTIC EXERCISES: CPT | Mod: GP | Performed by: PHYSICAL THERAPIST

## 2022-12-27 ASSESSMENT — ACTIVITIES OF DAILY LIVING (ADL)
STEPPING_UP_AND_DOWN_CURBS: MODERATE DIFFICULTY
DEEP_SQUATTING: SLIGHT DIFFICULTY
HOS_ADL_ITEM_SCORE_TOTAL: 22
TWISTING/PIVOTING_ON_INVOLVED_LEG: MODERATE DIFFICULTY
WALKING_UP_STEEP_HILLS: EXTREME DIFFICULTY
HOS_ADL_HIGHEST_POTENTIAL_SCORE: 68
WALKING_15_MINUTES_OR_GREATER: UNABLE TO DO
GOING_UP_1_FLIGHT_OF_STAIRS: MODERATE DIFFICULTY
WALKING_APPROXIMATELY_10_MINUTES: UNABLE TO DO
STANDING_FOR_15_MINUTES: EXTREME DIFFICULTY
GOING_DOWN_1_FLIGHT_OF_STAIRS: MODERATE DIFFICULTY
LIGHT_TO_MODERATE_WORK: EXTREME DIFFICULTY
HOS_ADL_SCORE(%): 32.35
PUTTING_ON_SOCKS_AND_SHOES: NO DIFFICULTY AT ALL
GETTING_INTO_AND_OUT_OF_AN_AVERAGE_CAR: MODERATE DIFFICULTY
WALKING_DOWN_STEEP_HILLS: EXTREME DIFFICULTY
ROLLING_OVER_IN_BED: SLIGHT DIFFICULTY
GETTING_INTO_AND_OUT_OF_A_BATHTUB: EXTREME DIFFICULTY
HEAVY_WORK: UNABLE TO DO
RECREATIONAL_ACTIVITIES: UNABLE TO DO
WALKING_INITIALLY: EXTREME DIFFICULTY
SITTING_FOR_15_MINUTES: EXTREME DIFFICULTY
HOS_ADL_COUNT: 17

## 2022-12-27 NOTE — PROGRESS NOTES
KEY PT FINDINGS:  1) TTP through piriformis, greater trochanter, TFL  2) Limited hip internal and external rotation  3) Weakness through the glute muscles    Physical Therapy Initial Evaluation: Subjective History     Injury/Condition Details:  Presenting Complaint Right hip (greater trochanter pain, right buttock pain)   Onset Timing/Date Worse within the past 6 months   Mechanism Moved to a place with a lot of stairs and the repetitive stair climbing at her new place.      Symptom Behavior Details    Primary Symptoms Constant symptoms; worsen with activity, pain (Location: right buttock, lateral hip, c-sign, occasional front of the hip pain, down the thigh, never past the knee., Quality: Sharp and Aching/Throbbing), stiffness, weakness, buckling/shifting/giving way   Worst Pain 6-7/10 (with see below)   Symptom Provocators Walking + after walking.   Prolonged standing    Sleeping > can't on the right side, can't find comfortable position  No issues crossing her legs.    Best Pain 2-3/10    Symptom Relievers Copper fit belt around her pelvis  Ice pack   Time of day dependent? Worse in evening after activity   Recent symptom change? no change in symptoms     Prior Testing/Intervention for current condition:  Prior Tests  x-ray   Prior Treatment none     Lifestyle & General Medical History:  Employment Currently not working    Usual physical activities  (within past year) Doing physical therapy for her MS, cannot do them currently do them due to the pain   Orthopaedic history See Epic Chart   Notable medical history See Epic Chart   Patient Reported Health good       Lower Extremity Physical Therapy Examination    Dynamic Movement Screen:  2 leg stance: weight shifted to the right leg, hip shifted to the right  2 leg squat: (Sit to stand) right leg does more work, weight is shifted, able to rise without using her hands    1 leg stance: Right: Proprio deficit; Left: Proprio deficits    Gait: with a single end cane,  altered on the left leg without ankle motion, leg remains straight             Hip Joint ROM: limited flexion (pain at hamstring), limited ER + IR, pain at lateral hip and buttock (no groin pain produced)  Left leg has limited motions due to the MS.     Lower Extremity Muscle Strength (x/5)   Hip IR Hip ER Knee Ext Hip ABD Hip Ext Knee Flex   Right  4/5 4/5 4/5 4/5 4/5 4/5   Left           Basic Muscle Activation:  Transversus Abdominus: Poor  Gluteal: Right: fair contraction, Left: Fair contraction     Lower Extremity Flexibility Screen:  Hamstrings (Supine SLR): Right: +; Left: ++  Gastroc (Supine Active DF): Right: +; Left: ++  Quadriceps (Prone KF): Right: +; Left: ++  Hip Flexors (Landon Test): Right: +; Left: ++  ITB (Mathieu Test): Right: +; Left: ++    Hip Special Testing: limited testing on the left due to MS, tone  Test Right Left   FADDIR (-)    EDY (-)    Log Roll (ER) (-)    Resisted SLR (-)      Resisted Movement Testing:  Test Right Left   Adduction Squeeze (-) (NT)   Psoas (-) (NT)   TFL (-) (NT)   ABD (-) (NT)     Hip Palpation:  Tender to palpation at: gluteus medius muscle belly, gluteus medius tendon insertion, gluteus angie muscle belly, gluteus angie tendon insertion and TFL muscle belly      Assessment/Plan:  Patient is a 57 year old female with right side hip complaints.    Patient has the following significant findings with corresponding treatment plan.                Diagnosis 1:  Greater trochanter pain  Pain -  hot/cold therapy, manual therapy, self management and education  Decreased ROM/flexibility - manual therapy, therapeutic exercise and home program  Decreased strength - therapeutic exercise, therapeutic activities and home program  Decreased proprioception - neuro re-education, therapeutic activities and home program  Impaired gait - gait training and home program  Decreased function - therapeutic activities and home program    Therapy Evaluation Codes:   1) History comprised  of:   Personal factors that impact the plan of care:      None.    Comorbidity factors that impact the plan of care are:      None.     Medications impacting care: None.  2) Examination of Body Systems comprised of:   Body structures and functions that impact the plan of care:      Hip.   Activity limitations that impact the plan of care are:      Sitting, Walking and Sleeping.  3) Clinical presentation characteristics are:   Stable/Uncomplicated.  4) Decision-Making    Low complexity using standardized patient assessment instrument and/or measureable assessment of functional outcome.  Cumulative Therapy Evaluation is: Low complexity.    Previous and current functional limitations:  (See Goal Flow Sheet for this information)    Short term and Long term goals: (See Goal Flow Sheet for this information)     Communication ability:  Patient appears to be able to clearly communicate and understand verbal and written communication and follow directions correctly.  Treatment Explanation - The following has been discussed with the patient:   RX ordered/plan of care  Anticipated outcomes  Possible risks and side effects  This patient would benefit from PT intervention to resume normal activities.   Rehab potential is good.    Frequency:  1 X week, once daily  Duration:  for 4 weeks then 2x month for 1 moth  Discharge Plan:  Achieve all LTG.  Independent in home treatment program.  Reach maximal therapeutic benefit.    Please refer to the daily flowsheet for treatment today, total treatment time and time spent performing 1:1 timed codes.

## 2022-12-27 NOTE — PROGRESS NOTES
[facility]    OUTPATIENT Physical Therapy ORTHOPEDIC EVALUATION  PLAN OF TREATMENT FOR OUTPATIENT REHABILITATION  (COMPLETE FOR INITIAL CLAIMS ONLY)  Patient's Last Name, First Name, M.I.  YOB: 1965  Marichuy Navarrete    Provider s Name:  [facility]   Medical Record No.  [id number]   Start of Care Date:  12/27/22   Onset Date:   12/13/22   Treatment Diagnosis:  Right hip Medical Diagnosis:  Hip pain, right       Goals:     12/27/22 0500   Body Part   Goals listed below are for Right Hip   Goal #1   Goal #1 ambulation   Previous Functional Level No restrictions   Performance Level from the right leg   Current Functional Level Minutes patient can walk   Performance Level <5 with increased pain   STG Target Performance Minutes patient will be able to walk   Performance Level 10 minutes with cane, pain <4/10 on right hip   Rationale for safe household ambulation   Due Date 01/17/23    LTG Target Performance Minutes patient will be able to  walk   Performance Level 15 with pain 3/10 pain or less   Rationale for safe household ambulation   Due Date 02/07/23         Therapy Frequency:  1x/week  Predicted Duration of Therapy Intervention:  for 4 weeks, 2x/month for 1 month    Dilcia Bardales, PT                 I CERTIFY THE NEED FOR THESE SERVICES FURNISHED UNDER        THIS PLAN OF TREATMENT AND WHILE UNDER MY CARE     (Physician attestation of this document indicates review and certification of the therapy plan).                     Certification Date From:  12/27/22   Certification Date To:  02/07/23    Referring Provider:  Ramírez Streeter    Initial Assessment        See Epic Evaluation SOC Date: 12/27/22

## 2023-01-13 ENCOUNTER — TELEPHONE (OUTPATIENT)
Dept: NEUROSURGERY | Facility: CLINIC | Age: 58
End: 2023-01-13

## 2023-01-17 NOTE — TELEPHONE ENCOUNTER
FUTURE VISIT INFORMATION      FUTURE VISIT INFORMATION:    Date: 1/24/2023    Time: 11am    Location: [facility]  REFERRAL INFORMATION:    Referring provider:  Dr. Ramsey     Referring providers clinic:  [facility]     Reason for visit/diagnosis  Trigeminal Neuralgia     RECORDS REQUESTED FROM:       Clinic name Comments Records Status Imaging Status   [facility]   Scanned to chart PACS         [facility]   Scanned to chart PACS          Internal Dr. Ramsey-3/8/2022 Epic No Images

## 2023-01-24 ENCOUNTER — PRE VISIT (OUTPATIENT)
Dept: NEUROSURGERY | Facility: CLINIC | Age: 58
End: 2023-01-24

## 2023-01-24 ENCOUNTER — VIRTUAL VISIT (OUTPATIENT)
Dept: NEUROSURGERY | Facility: CLINIC | Age: 58
End: 2023-01-24
Attending: PSYCHIATRY & NEUROLOGY
Payer: COMMERCIAL

## 2023-01-24 DIAGNOSIS — G50.0 TRIGEMINAL NEURALGIA: Primary | ICD-10-CM

## 2023-01-24 DIAGNOSIS — G35 MS (MULTIPLE SCLEROSIS) (H): ICD-10-CM

## 2023-01-24 PROCEDURE — 99203 OFFICE O/P NEW LOW 30 MIN: CPT | Mod: 93 | Performed by: NURSE PRACTITIONER

## 2023-01-24 RX ORDER — OXCARBAZEPINE 150 MG/1
150 TABLET, FILM COATED ORAL 2 TIMES DAILY
Qty: 60 TABLET | Refills: 1 | Status: SHIPPED | OUTPATIENT
Start: 2023-01-24

## 2023-01-24 NOTE — LETTER
"2023       RE: Marichuy Navarrete  [address] [place] [address] [address]  [facility] [address]     Dear Colleague,    Thank you for referring your patient, Marichuy Navarrete, to the [facility] [place] at [facility]. Please see a copy of my visit note below.      [facility]  Department of Neurosurgery      Name: Marichuy Navarrete  MRN: [id number]  Age: 57 year old  : 1965  Referring provider: Matias Ramsey  2023      Chief Complaint:   Left sided trigeminal neuralgia, secondary to Multiple Sclerosis   New patient    History of Present Illness:   Marichuy Navarrete is a 57 year old female with a history of trigeminal neuralgia since 2022 secondary to Multiple Sclerosis,  who is here today for further evaluation and management.     Today, I had a phone visit with her. She had MS symptoms for many years and was diagnosed in -15. She started to have left sided TN symptoms since 2022. This was acute in onset. She reports intermittent, sharp, shocking pain, starting in her left lower jaw and goes all the way to left forehead area. She was started on Carbamazepine 200 mg twice daily and she stopped this after a few doses since she had worsening balance issues. She used Gabapentin in the past and had muscle twitches. She is currently on Tizanidine for muscle spasms.     She had severe TN symptoms a week ago and now she feels like it is starting to \"calm down\".       Review of Systems:   Pertinent items are noted in HPI or as in patient entered ROS below, remainder of complete ROS is negative.   No flowsheet data found.     Active Medications:     Current Outpatient Medications:      acetaminophen (TYLENOL) 325 MG tablet, Take 1-2 tablets (325-650 mg) by mouth every 6 hours as needed for pain, Disp: 100 tablet, Rfl: 0     amLODIPine (NORVASC) 5 MG tablet, Take 5 mg by mouth, Disp: , Rfl:      carBAMazepine (TEGRETOL) 200 MG " tablet, Take 1 tablet (200 mg) by mouth 2 times daily, Disp: 60 tablet, Rfl: 11     cholecalciferol 1000 units TABS, Take 1,000 Units by mouth , Disp: , Rfl:      cyclobenzaprine (FLEXERIL) 5 MG tablet, Take 1-2 tablets (5-10 mg) by mouth nightly as needed for muscle spasms, Disp: 60 tablet, Rfl: 1     hydrOXYzine (ATARAX) 25 MG tablet, Take 1-2 tabs up to 4 times daily as needed for anxiety, or just at bedtime. May make sleepy., Disp: , Rfl:      rosuvastatin (CRESTOR) 20 MG tablet, Take 20 mg by mouth, Disp: , Rfl:      SENNA-docusate sodium (SENNA S) 8.6-50 MG tablet, Take 1 tablet by mouth 2 times daily as needed (take to prevent constipation while taking narcotic pain medication), Disp: 30 tablet, Rfl: 0     tiZANidine (ZANAFLEX) 2 MG tablet, Take 2-4 mg by mouth, Disp: , Rfl:     Current Facility-Administered Medications:      botulinum toxin type A (BOTOX) 100 units injection 400 Units, 400 Units, Intramuscular, Q90 Days, Nora Farnsworth MD, 200 Units at 06/23/22 0937     lidocaine (PF) (XYLOCAINE) 1 % injection 6 mL, 6 mL, , , Celio Puga MD, 6 mL at 07/22/19 0905     triamcinolone (KENALOG-40) injection 40 mg, 40 mg, , , Celio Puga MD, 40 mg at 07/22/19 0905      Allergies:   Ibuprofen      Past Medical History:  Past Medical History:   Diagnosis Date     Abnormal gait      Cervical radiculopathy      Hypertension      Lumbar radiculopathy      Multiple sclerosis (H)     LLE weakness     Osteopenia      Other chronic pain     SI joint pain, left sciatica     Pure hypercholesterolemia      Renal disease      Patient Active Problem List   Diagnosis     Partial tear of right hamstring     Hip pain, right        Past Surgical History:  Past Surgical History:   Procedure Laterality Date     OPTICAL TRACKING SYSTEM FUSION SACRAL ILIAC Left 3/18/2019    Procedure: STEALTH ASSISTED MINIMALLY INVASIVE SACRAL ILIAC JOINT FUSION LEFT;  Surgeon: Jason Matos MD;  Location: [facility] OR       Family  History:   No family history on file.      Social History:   Social History     Tobacco Use     Smoking status: Never     Smokeless tobacco: Never        Physical Exam:   N/A (Telephone visit)      Imaging:  No recent imaging. She is scheduled for brain MRI on 3/7/2023.      Assessment:  Left sided trigeminal neuralgia, secondary to Multiple Sclerosis   New patient    Plan:  We will start her on Oxcarbazepine and gradually increase to 150 mg twice daily to minimize the side effects. We will also complete some baseline labs. Patient is scheduled for a brain MRI in 3/2023. Patient to follow up with me in 6 weeks.        Annette Blunt CNP  Department of Neurosurgery    I spent 40 minutes on patient care activities related to this encounter on the date of service, including time spent reviewing the chart, obtaining history and examination and in counseling the patient, and in documentation in the electronic medical record.

## 2023-01-24 NOTE — PROGRESS NOTES
"Marichuy is a 57 year old who is being evaluated via a billable telephone visit.      What phone number would you like to be contacted at? [phone]  How would you like to obtain your AVS? Robbie    Distant Location (provider location):  On-site  Phone call duration: 45 minutes    [facility]  Department of Neurosurgery      Name: Marichuy Navarrete  MRN: [id number]  Age: 57 year old  : 1965  Referring provider: Matias Ramsey  2023      Chief Complaint:   Left sided trigeminal neuralgia, secondary to Multiple Sclerosis   New patient    History of Present Illness:   Marichuy Navarrete is a 57 year old female with a history of trigeminal neuralgia since 2022 secondary to Multiple Sclerosis,  who is here today for further evaluation and management.     Today, I had a phone visit with her. She had MS symptoms for many years and was diagnosed in -15. She started to have left sided TN symptoms since 2022. This was acute in onset. She reports intermittent, sharp, shocking pain, starting in her left lower jaw and goes all the way to left forehead area. She was started on Carbamazepine 200 mg twice daily and she stopped this after a few doses since she had worsening balance issues. She used Gabapentin in the past and had muscle twitches. She is currently on Tizanidine for muscle spasms.     She had severe TN symptoms a week ago and now she feels like it is starting to \"calm down\".       Review of Systems:   Pertinent items are noted in HPI or as in patient entered ROS below, remainder of complete ROS is negative.   No flowsheet data found.     Active Medications:     Current Outpatient Medications:      acetaminophen (TYLENOL) 325 MG tablet, Take 1-2 tablets (325-650 mg) by mouth every 6 hours as needed for pain, Disp: 100 tablet, Rfl: 0     amLODIPine (NORVASC) 5 MG tablet, Take 5 mg by mouth, Disp: , Rfl:      carBAMazepine (TEGRETOL) 200 MG tablet, Take 1 tablet (200 mg) by mouth 2 " times daily, Disp: 60 tablet, Rfl: 11     cholecalciferol 1000 units TABS, Take 1,000 Units by mouth , Disp: , Rfl:      cyclobenzaprine (FLEXERIL) 5 MG tablet, Take 1-2 tablets (5-10 mg) by mouth nightly as needed for muscle spasms, Disp: 60 tablet, Rfl: 1     hydrOXYzine (ATARAX) 25 MG tablet, Take 1-2 tabs up to 4 times daily as needed for anxiety, or just at bedtime. May make sleepy., Disp: , Rfl:      rosuvastatin (CRESTOR) 20 MG tablet, Take 20 mg by mouth, Disp: , Rfl:      SENNA-docusate sodium (SENNA S) 8.6-50 MG tablet, Take 1 tablet by mouth 2 times daily as needed (take to prevent constipation while taking narcotic pain medication), Disp: 30 tablet, Rfl: 0     tiZANidine (ZANAFLEX) 2 MG tablet, Take 2-4 mg by mouth, Disp: , Rfl:     Current Facility-Administered Medications:      botulinum toxin type A (BOTOX) 100 units injection 400 Units, 400 Units, Intramuscular, Q90 Days, Nora Farnsworth MD, 200 Units at 06/23/22 0937     lidocaine (PF) (XYLOCAINE) 1 % injection 6 mL, 6 mL, , , Celio Puga MD, 6 mL at 07/22/19 0905     triamcinolone (KENALOG-40) injection 40 mg, 40 mg, , , Celio Puga MD, 40 mg at 07/22/19 0905      Allergies:   Ibuprofen      Past Medical History:  Past Medical History:   Diagnosis Date     Abnormal gait      Cervical radiculopathy      Hypertension      Lumbar radiculopathy      Multiple sclerosis (H)     LLE weakness     Osteopenia      Other chronic pain     SI joint pain, left sciatica     Pure hypercholesterolemia      Renal disease      Patient Active Problem List   Diagnosis     Partial tear of right hamstring     Hip pain, right        Past Surgical History:  Past Surgical History:   Procedure Laterality Date     OPTICAL TRACKING SYSTEM FUSION SACRAL ILIAC Left 3/18/2019    Procedure: STEALTH ASSISTED MINIMALLY INVASIVE SACRAL ILIAC JOINT FUSION LEFT;  Surgeon: Jason Matos MD;  Location: [facility] OR       Family History:   No family history on file.       Social History:   Social History     Tobacco Use     Smoking status: Never     Smokeless tobacco: Never        Physical Exam:   N/A (Telephone visit)      Imaging:  No recent imaging. She is scheduled for brain MRI on 3/7/2023.      Assessment:  Left sided trigeminal neuralgia, secondary to Multiple Sclerosis   New patient    Plan:  We will start her on Oxcarbazepine and gradually increase to 150 mg twice daily to minimize the side effects. We will also complete some baseline labs. Patient is scheduled for a brain MRI in 3/2023. Patient to follow up with me in 6 weeks.        Annette Blunt CNP  Department of Neurosurgery    I spent 40 minutes on patient care activities related to this encounter on the date of service, including time spent reviewing the chart, obtaining history and examination and in counseling the patient, and in documentation in the electronic medical record.

## 2023-01-24 NOTE — PATIENT INSTRUCTIONS
Labs at your convenience.     Start Oxcarbazepine 150 mg at night x 4 nights. If no major side effects, increase to twice daily.     Follow-up with me in 6 weeks. Sooner if you have questions or concerns.

## 2023-01-28 ENCOUNTER — HEALTH MAINTENANCE LETTER (OUTPATIENT)
Age: 58
End: 2023-01-28

## 2023-02-07 ENCOUNTER — TELEPHONE (OUTPATIENT)
Dept: NEUROSURGERY | Facility: CLINIC | Age: 58
End: 2023-02-07
Payer: COMMERCIAL

## 2023-02-08 DIAGNOSIS — F40.240 CLAUSTROPHOBIA: Primary | ICD-10-CM

## 2023-02-08 NOTE — TELEPHONE ENCOUNTER
Health Call Center    Phone Message    May a detailed message be left on voicemail: yes     Reason for Call: Other: Pt called, because she is scheduled for an MRI on 3/7, and would like to know if Dr. Ramsey can send a medication to her pharmacy that will help her relax. Please contact pt back for further questions. Pt confirmed her pharmacy is  [id number] - [place], [place] - [address] AT [facility] [place] [place] [facility] [address].    Action Taken: Message routed to:  [facility] ([facility] Neurology    Travel Screening: Not Applicable

## 2023-02-09 RX ORDER — DIAZEPAM 5 MG
TABLET ORAL
Qty: 2 TABLET | Refills: 0 | Status: SHIPPED | OUTPATIENT
Start: 2023-02-09

## 2023-02-09 NOTE — TELEPHONE ENCOUNTER
Marichuy requesting medication for her upcoming MRI.  Diazepam Rx pended to Dr. Ramsey.    Quynh Carlos RN

## 2023-05-07 ENCOUNTER — HEALTH MAINTENANCE LETTER (OUTPATIENT)
Age: 58
End: 2023-05-07

## 2023-05-31 ENCOUNTER — TELEPHONE (OUTPATIENT)
Dept: PHYSICAL MEDICINE AND REHAB | Facility: CLINIC | Age: 58
End: 2023-05-31
Payer: COMMERCIAL

## 2023-05-31 NOTE — TELEPHONE ENCOUNTER
Reached out to pt but had to [facility] for pt to call back and schedule appt with Dr Borja regarding medication recheck/refill

## 2023-05-31 NOTE — TELEPHONE ENCOUNTER
Called and notified [facility] regarding the refill request being denies, since patient has not been seen since 1/2022, so would need to be seen again to assess if needing to refill again.     Message also routed to Clinic Navigator to call patient to see if she would like to schedule a follow up with Dr. Borja for follow up.     Stephanie Mathur, KRISTAN RN  RN Care Coordinator for Physical Medicine and Rehabilitation  [facility] [address] [address]  Office: [phone]  Fax: [phone]

## 2024-02-25 ENCOUNTER — HEALTH MAINTENANCE LETTER (OUTPATIENT)
Age: 59
End: 2024-02-25

## 2025-02-18 ENCOUNTER — TELEPHONE (OUTPATIENT)
Dept: ANESTHESIOLOGY | Facility: CLINIC | Age: 60
End: 2025-02-18
Payer: COMMERCIAL

## 2025-02-18 NOTE — TELEPHONE ENCOUNTER
Informed pt they have visit with Dr. Marina at 8:45 am on 2/25. Pt confirmed appointment. Pt had no questions.    Madelin Banuelos, EMT

## 2025-02-25 ENCOUNTER — ANCILLARY PROCEDURE (OUTPATIENT)
Dept: GENERAL RADIOLOGY | Facility: CLINIC | Age: 60
End: 2025-02-25
Attending: STUDENT IN AN ORGANIZED HEALTH CARE EDUCATION/TRAINING PROGRAM
Payer: COMMERCIAL

## 2025-02-25 ENCOUNTER — OFFICE VISIT (OUTPATIENT)
Dept: ANESTHESIOLOGY | Facility: CLINIC | Age: 60
End: 2025-02-25
Payer: COMMERCIAL

## 2025-02-25 VITALS
DIASTOLIC BLOOD PRESSURE: 86 MMHG | RESPIRATION RATE: 16 BRPM | SYSTOLIC BLOOD PRESSURE: 135 MMHG | OXYGEN SATURATION: 100 % | HEART RATE: 79 BPM

## 2025-02-25 DIAGNOSIS — M47.816 SPONDYLOSIS WITHOUT MYELOPATHY OR RADICULOPATHY, LUMBAR REGION: ICD-10-CM

## 2025-02-25 DIAGNOSIS — M53.3 SACROILIAC JOINT DYSFUNCTION: Primary | ICD-10-CM

## 2025-02-25 DIAGNOSIS — M79.18 MYOFASCIAL PAIN: ICD-10-CM

## 2025-02-25 PROCEDURE — 3075F SYST BP GE 130 - 139MM HG: CPT | Performed by: STUDENT IN AN ORGANIZED HEALTH CARE EDUCATION/TRAINING PROGRAM

## 2025-02-25 PROCEDURE — 73501 X-RAY EXAM HIP UNI 1 VIEW: CPT | Mod: RT | Performed by: RADIOLOGY

## 2025-02-25 PROCEDURE — 3079F DIAST BP 80-89 MM HG: CPT | Performed by: STUDENT IN AN ORGANIZED HEALTH CARE EDUCATION/TRAINING PROGRAM

## 2025-02-25 PROCEDURE — 72100 X-RAY EXAM L-S SPINE 2/3 VWS: CPT | Performed by: STUDENT IN AN ORGANIZED HEALTH CARE EDUCATION/TRAINING PROGRAM

## 2025-02-25 PROCEDURE — 99417 PROLNG OP E/M EACH 15 MIN: CPT | Performed by: STUDENT IN AN ORGANIZED HEALTH CARE EDUCATION/TRAINING PROGRAM

## 2025-02-25 PROCEDURE — 99205 OFFICE O/P NEW HI 60 MIN: CPT | Performed by: STUDENT IN AN ORGANIZED HEALTH CARE EDUCATION/TRAINING PROGRAM

## 2025-02-25 PROCEDURE — 1125F AMNT PAIN NOTED PAIN PRSNT: CPT | Performed by: STUDENT IN AN ORGANIZED HEALTH CARE EDUCATION/TRAINING PROGRAM

## 2025-02-25 RX ORDER — METHOCARBAMOL 500 MG/1
1000 TABLET, FILM COATED ORAL 3 TIMES DAILY PRN
Qty: 90 TABLET | Refills: 0 | Status: SHIPPED | OUTPATIENT
Start: 2025-02-25 | End: 2025-03-27

## 2025-02-25 ASSESSMENT — PAIN SCALES - GENERAL: PAINLEVEL_OUTOF10: SEVERE PAIN (9)

## 2025-02-25 NOTE — NURSING NOTE
RN reviewed AVS with patient. Patient to contact clinic if any questions/concerns. Patient verbalized understanding.    Stacey Holt RN

## 2025-02-25 NOTE — PATIENT INSTRUCTIONS
Medications:    methocarbamol (ROBAXIN) 500 MG tablet. You can take 500-1000 mg up to three times daily as needed.      *Please provide the clinic with a minium of 1 week notice, on all prescription refills.       Referrals:    Pain Psychology Referral placed-  Please contact their scheduling office at [phone] to schedule, if you have not heard from them within 2 business days.     Pain psychology Therapies Requested- Mindfulness training, CBT, Coping and relaxation therapy.       Imaging:    Lumbar Spine and Hip Xray ordered.    IMAGING SERVICES HOURS:    All imaging modalities are available from 7 a.m. - 9 p.m. Monday through Friday  X-ray, CT, MRI, and General Ultrasound appointments are available from 7 a.m. -3:30 p.m. on Saturdays  X-ray, CT and MRI appointments are available from 8 a.m. - 4:30 p.m. on Sundays  Please call [phone] to schedule imaging exams         Recommended Follow up:      Schedule follow up in 1-2 months.        Please call [phone] to schedule your clinic appointment if you don't already have an appointment scheduled.        To speak with a nurse, schedule/reschedule/cancel a clinic appointment, or request a medication refill call: [phone]    You can also reach us by [employer]: [email or web address]

## 2025-02-25 NOTE — PROGRESS NOTES
"[facility]  Consultation Note    PCP: Zamzam Oconnell  Referring Provider: Anish  Reason for consultation:  Marichuy Navarrete is a 59 year old female who is seen in consultation today for low back pain.    Chief Complaint  Chief Complaint   Patient presents with    Consult    Pain    Pain Management     New pain patient- referred by MD       Pain History  Marichuy Navarrete is a 59 year old female who presents for initial evaluation of right sided lower back pain x6 months. Reports gradual onset of right lower back pain very reminiscent of the SI pain she had on the left prior to SI fusion in 2019. Pain is significantly exacerbated by walking, movement, repositioning in bed, pushing a grocery cart and makes her \"feel older than I really am\". She has a massage chair that provides temporary relief and has also obtained fleeting relief with APAP and ice packs. She has tizanidine she uses only at bedtime as it is quite sedating. She also has hydroxyzine which she uses occasionally for sleep instead of tizanidine. Despite those meds she only achieves 3-4 hours of sleep at a time before needing to move from bed to her massage chair or vice versa. Her inability to get good sleep is particularly bothersome for her. She cannot take NSAIDs due to a history of a GI bleed.      Of note, she also has MS and struggles with some ADLs due to this diagnosis. She lives at home with her 25 yo son who is her PCA.     Pain Scores  Worst: 10/10  Best: 6/10  Average 10/10  Goal 0/10    Pain Characteristics  Quality: Sharp, aching, throbbing occasionally   Aggravating factors include: Walking, moving, pushing grocery cart, repositinging in bed  Relieving factors include: Tylenol/tizaidine temproarily   Location: Right lower back shooting into back of mid thigh  Timing: Constant but waxes and wanes with intensity    Sleep  Quality: Poor  Medications: Tizanidine or hydroxyzine     Functional Impairments  Self-rated function: " "25%  Regular physical activity: Walks  Functional goals: \"Go out for walks, push the grocery cart, live my life\"    Work Status  Employment:  Unemployed, MS  Work related injury?: no  Seeking disability: no  Involved in litigation: no    Social History  Lives at home with children.  Smoking: Never  Alcohol:  Very very little, a couple times per year  Nonprescription drugs: None    Current Pain Medications  APAP (325-650mg, q6h, PRN)  Cyclobenzaprine (5-10mg, BID, PRN)  Tizanidine (2-4mg, PRN)    Previous Pain Medication Trials  NSAIDs: None, history of GI bleed  Opioids: None  Antidepressants: None  Anticonvulsants: None  Skeletal muscle relaxants: Tizanidine  Topicals: OTC creams with high menthol    Other Therapies  Marichuy Navarrete has not been seen at a pain clinic in the past. Has had injections on left side in the past  PT: Yes, currently x3 weeks  Acupuncture: None  Chiropractic: None  TENs Unit: Yes, not helpful  Procedures: SI joint fusion on left, injections on left side    Past Medical History:   Diagnosis Date    Abnormal gait     Cervical radiculopathy     Hypertension     Lumbar radiculopathy     Multiple sclerosis (H)     LLE weakness    Osteopenia     Other chronic pain     SI joint pain, left sciatica    Pure hypercholesterolemia     Renal disease     past medical history reviewed with patient.   Past Surgical History:   Procedure Laterality Date    OPTICAL TRACKING SYSTEM FUSION SACRAL ILIAC Left 3/18/2019    Procedure: STEALTH ASSISTED MINIMALLY INVASIVE SACRAL ILIAC JOINT FUSION LEFT;  Surgeon: Jason Matos MD;  Location: [facility] OR    past surgical history reviewed with patient.     Medications  Current Outpatient Medications   Medication Sig Dispense Refill    acetaminophen (TYLENOL) 325 MG tablet Take 1-2 tablets (325-650 mg) by mouth every 6 hours as needed for pain 100 tablet 0    amLODIPine (NORVASC) 5 MG tablet Take 5 mg by mouth      carBAMazepine (TEGRETOL) 200 MG tablet Take 1 tablet " (200 mg) by mouth 2 times daily 60 tablet 11    cholecalciferol 1000 units TABS Take 1,000 Units by mouth       cyclobenzaprine (FLEXERIL) 5 MG tablet Take 1-2 tablets (5-10 mg) by mouth nightly as needed for muscle spasms 60 tablet 1    diazepam (VALIUM) 5 MG tablet Take 1 tablet 30 minutes prior to MRI. Then take 1 tablet at time of MRI if needed. Must have . 2 tablet 0    hydrOXYzine (ATARAX) 25 MG tablet Take 1-2 tabs up to 4 times daily as needed for anxiety, or just at bedtime. May make sleepy.      methocarbamol (ROBAXIN) 500 MG tablet Take 2 tablets (1,000 mg) by mouth 3 times daily as needed for muscle spasms. 90 tablet 0    OXcarbazepine (TRILEPTAL) 150 MG tablet Take 1 tablet (150 mg) by mouth 2 times daily 60 tablet 1    rosuvastatin (CRESTOR) 20 MG tablet Take 20 mg by mouth      SENNA-docusate sodium (SENNA S) 8.6-50 MG tablet Take 1 tablet by mouth 2 times daily as needed (take to prevent constipation while taking narcotic pain medication) 30 tablet 0    tiZANidine (ZANAFLEX) 2 MG tablet Take 2-4 mg by mouth       No current facility-administered medications for this visit.     [place] and [place] Prescription Monitoring Program reviewed    Allergies     Allergies   Allergen Reactions    Ibuprofen      Other reaction(s): GI Upset       Family History  family history is not on file.    Review of systems unremarkable to pertinent visit unless otherwise specified above.    Physical Exam  Vitals:    02/25/25 0842   BP: 135/86   Pulse: 79   Resp: 16   SpO2: 100%     There is no height or weight on file to calculate BMI.  General: In no apparent distress  Mental status: Normal affect, pleasant  Head: Atraumatic, normocephalic  Eyes: Extra-ocular movements grossly intact, no scleral icterus, pupils non-pinpoint  Cardiovascular: Regular rate  Respiratory: Comfortable respiratory rate and rhythm  Skin: No rashes or lesions noted on exposed areas of skin  Lymph: No supraclavicular  lymphadenopathy  Musculoskeletal:  Gait/Station/Posture: Difficulty with transfers, slow to gain balance, staggered gai  Cervical spine: Grossly normal    Thoracic spine:  Normal     Lumbar spine: Pain with rotation bilaterally, R>L   Negative straight leg raise bilaterally   Positive facet loading bilaterally, R>L    Myofascial tenderness:  R>L lumbar paraspinals, glutes  Relevant Positive Special Tests:   SI Joint: Compression, Distraction, Thigh thrust, and EDY (Celio's)     Neurologic:  CN:  CN II-XII are grossly intact.  Sensory:  Sensation intact to light touch throughout the L2-S1 dermatomes of the bilateral lower extremities.   Motor:  Strength 5/5 for bilateral hip flexion, knee extension, dorsiflexion, EHL, and plantarflexion.     Pertinent Imaging  Personally reviewed imaging:    XR Pelvis (12/1/22)  Postoperative changes of left sacroiliac joint fusion instrumentation  without evidence of hardware complication.     Other Tests  I personally reviewed the following today:  Labs: none    Record Review and Communications  Recent primary care notes reviewed today.  Reviewed the [place] Prescription Monitoring Program today.    Assessment  Marichuy Navarrete is a 59 year old female who presents with 6 months of increasingly pain along her low back and right buttock with radiation into her thighs (R>L). She is s/p left SIJ fusion in 2021 with Dr. Matos. The new pain started without inciting event. It is somatic in nature without signs or symptoms of radiculitis. There is considerable overlap in her pain when consider lumbar facet arthropathy and right SI inflammation. I believe she probably has both, but with a higher degree of SIJ pain at this time.    Right SIJ dysfunction  Lumbar spondylosis without myelopathy or radiculopathy  Myofascial pain syndrome    Plan  Diagnosis reviewed, treatment option addressed, and risk/benefits discussed.  Self-care instructions given. I am recommending a multidisciplinary  treatment plan to help this patient better manage her pain.      Physical therapy/home exercise program:  Continue PT and HEP  Medications:  Optimize APAP (1000mg, TID, scheduled)  Avoid NSAIDs due to history of GIB  Start Methocarbamol (250mg-1000mg, BID, PRN)  Continue tizanidine (2mg, at bedtime); too sedating during the day  Consider duloxetine in the future  Continue diclofenac (1%) topical OTC  Interventions:  Consider both right SIJ injection and, if not effective, bilateral L3-5 MBBs  Pain and referral pattern is similar with quite a bit of overlapping (axial, buttock/thigh radiation). Given four positive SIJ provocative maneuvers will prefer to start with SIJ injection.  New diagnostics ordered today:  Xray right hip/SIJ and lumbar xray  Referrals:  Pain psychologist to address issues of relaxation, behavioral change, coping style, and other factors important to improvement  Follow up: 1-2 month       Total time spent on the day of the encounter was 78 minutes, and more than 50% of face to face time was spent in counseling and/or coordination of care regarding principles of multidisciplinary care, medication management, and interventional options.      Claudio Marina MD  Department of Anesthesiology  Chronic and Interventional Pain Medicine

## 2025-02-25 NOTE — LETTER
"2/25/2025       RE: Marichuy Navarrete  [address] [address]     Dear Colleague,    Thank you for referring your patient, Marichuy Navarrete, to the [facility] [place] at [facility]. Please see a copy of my visit note below.    [facility]  Consultation Note    PCP: Zamzam Oconnell  Referring Provider: Anish  Reason for consultation:  Marichuy Navarrete is a 59 year old female who is seen in consultation today for low back pain.    Chief Complaint  Chief Complaint   Patient presents with     Consult     Pain     Pain Management     New pain patient- referred by MD       Pain History  Marichuy Navarrete is a 59 year old female who presents for initial evaluation of right sided lower back pain x6 months. Reports gradual onset of right lower back pain very reminiscent of the SI pain she had on the left prior to SI fusion in 2019. Pain is significantly exacerbated by walking, movement, repositioning in bed, pushing a grocery cart and makes her \"feel older than I really am\". She has a massage chair that provides temporary relief and has also obtained fleeting relief with APAP and ice packs. She has tizanidine she uses only at bedtime as it is quite sedating. She also has hydroxyzine which she uses occasionally for sleep instead of tizanidine. Despite those meds she only achieves 3-4 hours of sleep at a time before needing to move from bed to her massage chair or vice versa. Her inability to get good sleep is particularly bothersome for her. She cannot take NSAIDs due to a history of a GI bleed.      Of note, she also has MS and struggles with some ADLs due to this diagnosis. She lives at home with her 27 yo son who is her PCA.     Pain Scores  Worst: 10/10  Best: 6/10  Average 10/10  Goal 0/10    Pain Characteristics  Quality: Sharp, aching, throbbing occasionally   Aggravating factors " "include: Walking, moving, pushing grocery cart, repositinging in bed  Relieving factors include: Tylenol/tizaidine temproarily   Location: Right lower back shooting into back of mid thigh  Timing: Constant but waxes and wanes with intensity    Sleep  Quality: Poor  Medications: Tizanidine or hydroxyzine     Functional Impairments  Self-rated function: 25%  Regular physical activity: Walks  Functional goals: \"Go out for walks, push the grocery cart, live my life\"    Work Status  Employment:  Unemployed, MS  Work related injury?: no  Seeking disability: no  Involved in litigation: no    Social History  Lives at home with children.  Smoking: Never  Alcohol:  Very very little, a couple times per year  Nonprescription drugs: None    Current Pain Medications  APAP (325-650mg, q6h, PRN)  Cyclobenzaprine (5-10mg, BID, PRN)  Tizanidine (2-4mg, PRN)    Previous Pain Medication Trials  NSAIDs: None, history of GI bleed  Opioids: None  Antidepressants: None  Anticonvulsants: None  Skeletal muscle relaxants: Tizanidine  Topicals: OTC creams with high menthol    Other Therapies  Marichuy Navarrete has not been seen at a pain clinic in the past. Has had injections on left side in the past  PT: Yes, currently x3 weeks  Acupuncture: None  Chiropractic: None  TENs Unit: Yes, not helpful  Procedures: SI joint fusion on left, injections on left side    Past Medical History:   Diagnosis Date     Abnormal gait      Cervical radiculopathy      Hypertension      Lumbar radiculopathy      Multiple sclerosis (H)     LLE weakness     Osteopenia      Other chronic pain     SI joint pain, left sciatica     Pure hypercholesterolemia      Renal disease     past medical history reviewed with patient.   Past Surgical History:   Procedure Laterality Date     OPTICAL TRACKING SYSTEM FUSION SACRAL ILIAC Left 3/18/2019    Procedure: STEALTH ASSISTED MINIMALLY INVASIVE SACRAL ILIAC JOINT FUSION LEFT;  Surgeon: Jason Matos MD;  Location: [facility] OR    " past surgical history reviewed with patient.     Medications  Current Outpatient Medications   Medication Sig Dispense Refill     acetaminophen (TYLENOL) 325 MG tablet Take 1-2 tablets (325-650 mg) by mouth every 6 hours as needed for pain 100 tablet 0     amLODIPine (NORVASC) 5 MG tablet Take 5 mg by mouth       carBAMazepine (TEGRETOL) 200 MG tablet Take 1 tablet (200 mg) by mouth 2 times daily 60 tablet 11     cholecalciferol 1000 units TABS Take 1,000 Units by mouth        cyclobenzaprine (FLEXERIL) 5 MG tablet Take 1-2 tablets (5-10 mg) by mouth nightly as needed for muscle spasms 60 tablet 1     diazepam (VALIUM) 5 MG tablet Take 1 tablet 30 minutes prior to MRI. Then take 1 tablet at time of MRI if needed. Must have . 2 tablet 0     hydrOXYzine (ATARAX) 25 MG tablet Take 1-2 tabs up to 4 times daily as needed for anxiety, or just at bedtime. May make sleepy.       methocarbamol (ROBAXIN) 500 MG tablet Take 2 tablets (1,000 mg) by mouth 3 times daily as needed for muscle spasms. 90 tablet 0     OXcarbazepine (TRILEPTAL) 150 MG tablet Take 1 tablet (150 mg) by mouth 2 times daily 60 tablet 1     rosuvastatin (CRESTOR) 20 MG tablet Take 20 mg by mouth       SENNA-docusate sodium (SENNA S) 8.6-50 MG tablet Take 1 tablet by mouth 2 times daily as needed (take to prevent constipation while taking narcotic pain medication) 30 tablet 0     tiZANidine (ZANAFLEX) 2 MG tablet Take 2-4 mg by mouth       No current facility-administered medications for this visit.     [place] and [place] Prescription Monitoring Program reviewed    Allergies     Allergies   Allergen Reactions     Ibuprofen      Other reaction(s): GI Upset       Family History  family history is not on file.    Review of systems unremarkable to pertinent visit unless otherwise specified above.    Physical Exam  Vitals:    02/25/25 0842   BP: 135/86   Pulse: 79   Resp: 16   SpO2: 100%     There is no height or weight on file to calculate BMI.  General: In no  apparent distress  Mental status: Normal affect, pleasant  Head: Atraumatic, normocephalic  Eyes: Extra-ocular movements grossly intact, no scleral icterus, pupils non-pinpoint  Cardiovascular: Regular rate  Respiratory: Comfortable respiratory rate and rhythm  Skin: No rashes or lesions noted on exposed areas of skin  Lymph: No supraclavicular lymphadenopathy  Musculoskeletal:  Gait/Station/Posture: Difficulty with transfers, slow to gain balance, staggered gai  Cervical spine: Grossly normal    Thoracic spine:  Normal     Lumbar spine: Pain with rotation bilaterally, R>L   Negative straight leg raise bilaterally   Positive facet loading bilaterally, R>L    Myofascial tenderness:  R>L lumbar paraspinals, glutes  Relevant Positive Special Tests:   SI Joint: Compression, Distraction, Thigh thrust, and EDY (Celio's)     Neurologic:  CN:  CN II-XII are grossly intact.  Sensory:  Sensation intact to light touch throughout the L2-S1 dermatomes of the bilateral lower extremities.   Motor:  Strength 5/5 for bilateral hip flexion, knee extension, dorsiflexion, EHL, and plantarflexion.     Pertinent Imaging  Personally reviewed imaging:    XR Pelvis (12/1/22)  Postoperative changes of left sacroiliac joint fusion instrumentation  without evidence of hardware complication.     Other Tests  I personally reviewed the following today:  Labs: none    Record Review and Communications  Recent primary care notes reviewed today.  Reviewed the [place] Prescription Monitoring Program today.    Assessment  Marichuy Navarrete is a 59 year old female who presents with 6 months of increasingly pain along her low back and right buttock with radiation into her thighs (R>L). She is s/p left SIJ fusion in 2021 with Dr. Matos. The new pain started without inciting event. It is somatic in nature without signs or symptoms of radiculitis. There is considerable overlap in her pain when consider lumbar facet arthropathy and right SI inflammation. I  believe she probably has both, but with a higher degree of SIJ pain at this time.    Right SIJ dysfunction  Lumbar spondylosis without myelopathy or radiculopathy  Myofascial pain syndrome    Plan  Diagnosis reviewed, treatment option addressed, and risk/benefits discussed.  Self-care instructions given. I am recommending a multidisciplinary treatment plan to help this patient better manage her pain.      Physical therapy/home exercise program:  Continue PT and HEP  Medications:  Optimize APAP (1000mg, TID, scheduled)  Avoid NSAIDs due to history of GIB  Start Methocarbamol (250mg-1000mg, BID, PRN)  Continue tizanidine (2mg, at bedtime); too sedating during the day  Consider duloxetine in the future  Continue diclofenac (1%) topical OTC  Interventions:  Consider both right SIJ injection and, if not effective, bilateral L3-5 MBBs  Pain and referral pattern is similar with quite a bit of overlapping (axial, buttock/thigh radiation). Given four positive SIJ provocative maneuvers will prefer to start with SIJ injection.  New diagnostics ordered today:  Xray right hip/SIJ and lumbar xray  Referrals:  Pain psychologist to address issues of relaxation, behavioral change, coping style, and other factors important to improvement  Follow up: 1-2 month       Total time spent on the day of the encounter was 78 minutes, and more than 50% of face to face time was spent in counseling and/or coordination of care regarding principles of multidisciplinary care, medication management, and interventional options.      Claudio Marina MD  Department of Anesthesiology  Chronic and Interventional Pain Medicine      Again, thank you for allowing me to participate in the care of your patient.      Sincerely,    Claudio Marina MD

## 2025-02-25 NOTE — NURSING NOTE
Patient presents with:  Consult  Pain  Pain Management: New pain patient- referred by MD      Severe Pain (9)     Pain Medications       Analgesics Other Refills Start End     acetaminophen (TYLENOL) 325 MG tablet 0 3/18/2019 --    Sig - Route: Take 1-2 tablets (325-650 mg) by mouth every 6 hours as needed for pain - Oral    Class: E-Prescribe            What medications are you using for pain? Tylenol, tizanadine, cyclobenzaprine    Have you been seen by another pain clinic/ provider? no      Expectations: see what he can do to nip it in the bud    Keely Schneider LPN

## 2025-03-15 ENCOUNTER — HEALTH MAINTENANCE LETTER (OUTPATIENT)
Age: 60
End: 2025-03-15

## 2025-03-24 ASSESSMENT — PAIN SCALES - PAIN ENJOYMENT GENERAL ACTIVITY SCALE (PEG)
AVG_PAIN_PASTWEEK: 10
INTERFERED_ENJOYMENT_LIFE: 10 - COMPLETELY INTERFERES
INTERFERED_ENJOYMENT_LIFE: 10
AVG_PAIN_PASTWEEK: 10 - PAIN AS BAD AS YOU CAN IMAGINE
INTERFERED_GENERAL_ACTIVITY: 10
PEG_TOTALSCORE: 10
INTERFERED_GENERAL_ACTIVITY: 10 - COMPLETELY INTERFERES

## 2025-03-25 ENCOUNTER — OFFICE VISIT (OUTPATIENT)
Dept: ANESTHESIOLOGY | Facility: CLINIC | Age: 60
End: 2025-03-25
Attending: STUDENT IN AN ORGANIZED HEALTH CARE EDUCATION/TRAINING PROGRAM
Payer: COMMERCIAL

## 2025-03-25 VITALS
RESPIRATION RATE: 16 BRPM | DIASTOLIC BLOOD PRESSURE: 89 MMHG | HEART RATE: 67 BPM | OXYGEN SATURATION: 100 % | SYSTOLIC BLOOD PRESSURE: 134 MMHG

## 2025-03-25 DIAGNOSIS — M79.18 MYOFASCIAL PAIN: Primary | ICD-10-CM

## 2025-03-25 DIAGNOSIS — M47.816 SPONDYLOSIS WITHOUT MYELOPATHY OR RADICULOPATHY, LUMBAR REGION: ICD-10-CM

## 2025-03-25 DIAGNOSIS — M53.3 SACROILIAC JOINT DYSFUNCTION: ICD-10-CM

## 2025-03-25 PROCEDURE — 3079F DIAST BP 80-89 MM HG: CPT | Performed by: STUDENT IN AN ORGANIZED HEALTH CARE EDUCATION/TRAINING PROGRAM

## 2025-03-25 PROCEDURE — 99214 OFFICE O/P EST MOD 30 MIN: CPT | Performed by: STUDENT IN AN ORGANIZED HEALTH CARE EDUCATION/TRAINING PROGRAM

## 2025-03-25 PROCEDURE — 3075F SYST BP GE 130 - 139MM HG: CPT | Performed by: STUDENT IN AN ORGANIZED HEALTH CARE EDUCATION/TRAINING PROGRAM

## 2025-03-25 PROCEDURE — 1125F AMNT PAIN NOTED PAIN PRSNT: CPT | Performed by: STUDENT IN AN ORGANIZED HEALTH CARE EDUCATION/TRAINING PROGRAM

## 2025-03-25 ASSESSMENT — PAIN SCALES - GENERAL: PAINLEVEL_OUTOF10: SEVERE PAIN (8)

## 2025-03-25 NOTE — LETTER
"3/25/2025       RE: Marichuy Navarrete  [address]  [facility] [address]     Dear Colleague,    Thank you for referring your patient, Marichuy Navarrete, to the [facility] FOR [facility]PREH[facility]IVE [facility]NAGEMENT [place] at [facility]. Please see a copy of my visit note below.    [facility]  Progress Note    PCP: Zamzam Oconnell  Chief Complaint:   Chief Complaint   Patient presents with     RECHECK     Pain Management     Pain     Follow up-no changes reported       Pain History  Per the initial pain evaluation:  \"Marichuy Navarrete is a 59 year old female who presents for initial evaluation of right sided lower back pain x6 months. Reports gradual onset of right lower back pain very reminiscent of the SI pain she had on the left prior to SI fusion in 2019. Pain is significantly exacerbated by walking, movement, repositioning in bed, pushing a grocery cart and makes her \"feel older than I really am\". She has a massage chair that provides temporary relief and has also obtained fleeting relief with APAP and ice packs. She has tizanidine she uses only at bedtime as it is quite sedating. She also has hydroxyzine which she uses occasionally for sleep instead of tizanidine. Despite those meds she only achieves 3-4 hours of sleep at a time before needing to move from bed to her massage chair or vice versa. Her inability to get good sleep is particularly bothersome for her. She cannot take NSAIDs due to a history of a GI bleed.       Of note, she also has MS and struggles with some ADLs due to this diagnosis. She lives at home with her 25 yo son who is her PCA. \"    Interval History  Marichuy Navarrete is a 60 year old female last seen on 2/25/25. Since then she hasn't had as bad as pain lately. She has found benefit from HEP and massage chair. Last week was her last PT appointment.       Current Pain Treatments  Medications:   APAP (325-650mg, " q6h, PRN)  Cyclobenzaprine (5-10mg, BID, PRN)  Tizanidine (2-4mg, PRN)    Interventions:   None    Other:   PT    Previous Pain Treatments  NSAIDs: None, history of GI bleed  Opioids: None  Antidepressants: None  Anticonvulsants: None  Skeletal muscle relaxants: Tizanidine  Topicals: OTC creams with high menthol    Review of [place] Prescription Monitoring Program ()  The  was checked.    MEDICATIONS  Current Outpatient Medications   Medication Sig Dispense Refill     acetaminophen (TYLENOL) 325 MG tablet Take 1-2 tablets (325-650 mg) by mouth every 6 hours as needed for pain 100 tablet 0     amLODIPine (NORVASC) 5 MG tablet Take 5 mg by mouth       cholecalciferol 1000 units TABS Take 1,000 Units by mouth        cyclobenzaprine (FLEXERIL) 5 MG tablet Take 1-2 tablets (5-10 mg) by mouth nightly as needed for muscle spasms 60 tablet 1     diazepam (VALIUM) 5 MG tablet Take 1 tablet 30 minutes prior to MRI. Then take 1 tablet at time of MRI if needed. Must have . 2 tablet 0     hydrOXYzine (ATARAX) 25 MG tablet Take 1-2 tabs up to 4 times daily as needed for anxiety, or just at bedtime. May make sleepy.       methocarbamol (ROBAXIN) 500 MG tablet Take 2 tablets (1,000 mg) by mouth 3 times daily as needed for muscle spasms. 90 tablet 0     OXcarbazepine (TRILEPTAL) 150 MG tablet Take 1 tablet (150 mg) by mouth 2 times daily 60 tablet 1     rosuvastatin (CRESTOR) 20 MG tablet Take 20 mg by mouth       SENNA-docusate sodium (SENNA S) 8.6-50 MG tablet Take 1 tablet by mouth 2 times daily as needed (take to prevent constipation while taking narcotic pain medication) 30 tablet 0     tiZANidine (ZANAFLEX) 2 MG tablet Take 2-4 mg by mouth       carBAMazepine (TEGRETOL) 200 MG tablet Take 1 tablet (200 mg) by mouth 2 times daily 60 tablet 11       ALLERGIES  Allergies   Allergen Reactions     Ibuprofen      Other reaction(s): GI Upset       SOCIAL HISTORY  Social History     Socioeconomic History     Marital  status:    Tobacco Use     Smoking status: Never     Smokeless tobacco: Never     Social Drivers of Health     Financial Resource Strain: Low Risk  (5/5/2023)    Received from [facility] [employer] [facility]    Financial Resource Strain      Difficulty of Paying Living Expenses: 3   Food Insecurity: No Food Insecurity (5/5/2023)    Received from [facility] [employer] [facility]    Food Insecurity      Worried About Running Out of Food in the Last Year: 1   Transportation Needs: No Transportation Needs (5/5/2023)    Received from [facility]    Transportation Needs      Lack of Transportation (Medical): 1   Social Connections: Socially Integrated (5/5/2023)    Received from [facility]    Social Connections      Frequency of Communication with Friends and Family: 0   Housing Stability: Low Risk  (5/5/2023)    Received from [facility] [employer] [facility]    Housing Stability      Unable to Pay for Housing in the Last Year: 1       REVIEW OF SYSTEMS   ROS: 10 point ROS neg other than the symptoms noted above in the HPI.    MEDICAL CHANGES  Any changes in medical history since they were last seen?  No    Objective  /89   Pulse 67   Resp 16   SpO2 100%   Physical Exam  General: AOx3, conversational, mood appropriate  Neuro: CN II-XII grossly intact, no new focal neurologic deficits, spontaneous movements of all extremities  Pulmonary: Comfortable respiratory rate and rhythm  Cardiac: Regular rate and rhythm, extremities well perfused    Pertinent Imaging  Personally reviewed imaging:    XR Pelvis (12/1/22)  Postoperative changes of left sacroiliac joint fusion  instrumentation  without evidence of hardware complication.     Other Tests  I personally reviewed the following today:  Labs: None    Record Review and Communications  Recent physical therapy notes reviewed today.    Review of Electronic Chart: Today I have also reviewed available medical information in the patient's medical record at [facility] and Care Everywhere (if available), including relevant provider notes, laboratory work, and imaging.     Assessment  Marichuy Navarrete is a 60 year old female with 6 months of increasingly pain along her low back and right buttock with radiation into her thighs (R>L). She is s/p left SIJ fusion in 2021 with Dr. Matos. The new pain started without inciting event. It is somatic in nature without signs or symptoms of radiculitis. There is considerable overlap in her pain when consider lumbar facet arthropathy and right SI inflammation. I believe she probably has both, but with a higher degree of SIJ pain at this time.     Visit Diagnoses  1. Myofascial pain    2. Sacroiliac joint dysfunction    3. Spondylosis without myelopathy or radiculopathy, lumbar region        Plan  Diagnosis reviewed, treatment option addressed, and risk/benefits discussed. Self-care instructions given. I am recommending a multidisciplinary treatment plan to help this patient better manage their pain. This includes:    - Increase methocarbamol from 500mg to 1000mg to see if it helps with right flank/buttock pain  - Has tried and failed duloxetine  - Consider right SIJI    Follow up: 2-3 months    Claudio Marina MD  Department of Anesthesiology  Chronic and Interventional Pain Medicine      BILLING TIME DOCUMENTATION:   The total TIME spent on this patient on the date of the encounter/appointment was 30 minutes.      TOTAL TIME includes:   Time spent preparing to see the patient (reviewing records and tests)   Time spent face to face (or over the phone) with the patient   Time spent ordering  tests, medications, procedures and referrals   Time spent referring and communicating with other healthcare professionals   Time spent documenting clinical information in Epic      Again, thank you for allowing me to participate in the care of your patient.      Sincerely,    Claudio Marina MD

## 2025-03-25 NOTE — PATIENT INSTRUCTIONS
Medications:    You may increase Methocarbamol from 500 mg twice daily to 1000 mg twice daily.      *Please provide the clinic with a minium of 1 week notice, on all prescription refills.         Recommended Follow up:      Schedule follow up in 2-3 months.       Please call [phone] to schedule your clinic appointment if you don't already have an appointment scheduled.        To speak with a nurse, schedule/reschedule/cancel a clinic appointment, or request a medication refill call: [phone]    You can also reach us by [employer]: [email or web address]

## 2025-03-25 NOTE — NURSING NOTE
Patient presents with:  RECHECK  Pain Management  Pain: Follow up-no changes reported      Severe Pain (8)     Pain Medications       Analgesics Other Refills Start End     acetaminophen (TYLENOL) 325 MG tablet 0 3/18/2019 --    Sig - Route: Take 1-2 tablets (325-650 mg) by mouth every 6 hours as needed for pain - Oral    Class: E-Prescribe            What medications are you using for pain? Tylenol, cyclobenzaprine         What refills are you needing today? none    Expectations: Results for Samra Schneider LPN

## 2025-03-25 NOTE — PROGRESS NOTES
"[facility]  Progress Note    PCP: Zamzam Oconnell  Chief Complaint:   Chief Complaint   Patient presents with    RECHECK    Pain Management    Pain     Follow up-no changes reported       Pain History  Per the initial pain evaluation:  \"Marichuy Navarrete is a 59 year old female who presents for initial evaluation of right sided lower back pain x6 months. Reports gradual onset of right lower back pain very reminiscent of the SI pain she had on the left prior to SI fusion in 2019. Pain is significantly exacerbated by walking, movement, repositioning in bed, pushing a grocery cart and makes her \"feel older than I really am\". She has a massage chair that provides temporary relief and has also obtained fleeting relief with APAP and ice packs. She has tizanidine she uses only at bedtime as it is quite sedating. She also has hydroxyzine which she uses occasionally for sleep instead of tizanidine. Despite those meds she only achieves 3-4 hours of sleep at a time before needing to move from bed to her massage chair or vice versa. Her inability to get good sleep is particularly bothersome for her. She cannot take NSAIDs due to a history of a GI bleed.       Of note, she also has MS and struggles with some ADLs due to this diagnosis. She lives at home with her 27 yo son who is her PCA. \"    Interval History  Marichuy Navarrete is a 60 year old female last seen on 2/25/25. Since then she hasn't had as bad as pain lately. She has found benefit from HEP and massage chair. Last week was her last PT appointment.       Current Pain Treatments  Medications:   APAP (325-650mg, q6h, PRN)  Cyclobenzaprine (5-10mg, BID, PRN)  Tizanidine (2-4mg, PRN)    Interventions:   None    Other:   PT    Previous Pain Treatments  NSAIDs: None, history of GI bleed  Opioids: None  Antidepressants: None  Anticonvulsants: None  Skeletal muscle relaxants: Tizanidine  Topicals: OTC creams with high menthol    Review of [place] Prescription " Monitoring Program ()  The  was checked.    MEDICATIONS  Current Outpatient Medications   Medication Sig Dispense Refill    acetaminophen (TYLENOL) 325 MG tablet Take 1-2 tablets (325-650 mg) by mouth every 6 hours as needed for pain 100 tablet 0    amLODIPine (NORVASC) 5 MG tablet Take 5 mg by mouth      cholecalciferol 1000 units TABS Take 1,000 Units by mouth       cyclobenzaprine (FLEXERIL) 5 MG tablet Take 1-2 tablets (5-10 mg) by mouth nightly as needed for muscle spasms 60 tablet 1    diazepam (VALIUM) 5 MG tablet Take 1 tablet 30 minutes prior to MRI. Then take 1 tablet at time of MRI if needed. Must have . 2 tablet 0    hydrOXYzine (ATARAX) 25 MG tablet Take 1-2 tabs up to 4 times daily as needed for anxiety, or just at bedtime. May make sleepy.      methocarbamol (ROBAXIN) 500 MG tablet Take 2 tablets (1,000 mg) by mouth 3 times daily as needed for muscle spasms. 90 tablet 0    OXcarbazepine (TRILEPTAL) 150 MG tablet Take 1 tablet (150 mg) by mouth 2 times daily 60 tablet 1    rosuvastatin (CRESTOR) 20 MG tablet Take 20 mg by mouth      SENNA-docusate sodium (SENNA S) 8.6-50 MG tablet Take 1 tablet by mouth 2 times daily as needed (take to prevent constipation while taking narcotic pain medication) 30 tablet 0    tiZANidine (ZANAFLEX) 2 MG tablet Take 2-4 mg by mouth      carBAMazepine (TEGRETOL) 200 MG tablet Take 1 tablet (200 mg) by mouth 2 times daily 60 tablet 11       ALLERGIES  Allergies   Allergen Reactions    Ibuprofen      Other reaction(s): GI Upset       SOCIAL HISTORY  Social History     Socioeconomic History    Marital status:    Tobacco Use    Smoking status: Never    Smokeless tobacco: Never     Social Drivers of Health     Financial Resource Strain: Low Risk  (5/5/2023)    [facility]    Financial Resource Strain     Difficulty of Paying Living Expenses: 3   Food Insecurity: No  Food Insecurity (5/5/2023)    Received from [facility], [facility]    Food Insecurity     Worried About Running Out of Food in the Last Year: 1   Transportation Needs: No Transportation Needs (5/5/2023)    Received from [facility], [facility]    Transportation Needs     Lack of Transportation (Medical): 1   Social Connections: Socially Integrated (5/5/2023)    Received from [facility], [facility]    Social Connections     Frequency of Communication with Friends and Family: 0   Housing Stability: Low Risk  (5/5/2023)    Received from [facility], [facility]    Housing Stability     Unable to Pay for Housing in the Last Year: 1       REVIEW OF SYSTEMS   ROS: 10 point ROS neg other than the symptoms noted above in the HPI.    MEDICAL CHANGES  Any changes in medical history since they were last seen?  No    Objective  /89   Pulse 67   Resp 16   SpO2 100%   Physical Exam  General: AOx3, conversational, mood appropriate  Neuro: CN II-XII grossly intact, no new focal neurologic deficits, spontaneous movements of all extremities  Pulmonary: Comfortable respiratory rate and rhythm  Cardiac: Regular rate and rhythm, extremities well perfused    Pertinent Imaging  Personally reviewed imaging:    XR Pelvis (12/1/22)  Postoperative changes of left sacroiliac joint fusion instrumentation  without evidence of hardware complication.     Other Tests  I personally reviewed the following today:  Labs: None    Record Review and Communications  Recent physical therapy notes reviewed today.    Review of Electronic Chart: Today I have also reviewed available medical information in the patient's medical record at [facility] ([facility]) and Care Everywhere (if  available), including relevant provider notes, laboratory work, and imaging.     Assessment  Marichuy Navarrete is a 60 year old female with 6 months of increasingly pain along her low back and right buttock with radiation into her thighs (R>L). She is s/p left SIJ fusion in 2021 with Dr. Matos. The new pain started without inciting event. It is somatic in nature without signs or symptoms of radiculitis. There is considerable overlap in her pain when consider lumbar facet arthropathy and right SI inflammation. I believe she probably has both, but with a higher degree of SIJ pain at this time.     Visit Diagnoses  1. Myofascial pain    2. Sacroiliac joint dysfunction    3. Spondylosis without myelopathy or radiculopathy, lumbar region        Plan  Diagnosis reviewed, treatment option addressed, and risk/benefits discussed. Self-care instructions given. I am recommending a multidisciplinary treatment plan to help this patient better manage their pain. This includes:    - Increase methocarbamol from 500mg to 1000mg to see if it helps with right flank/buttock pain  - Has tried and failed duloxetine  - Consider right SIJI    Follow up: 2-3 months    Claudio Marina MD  Department of Anesthesiology  Chronic and Interventional Pain Medicine      BILLING TIME DOCUMENTATION:   The total TIME spent on this patient on the date of the encounter/appointment was 30 minutes.      TOTAL TIME includes:   Time spent preparing to see the patient (reviewing records and tests)   Time spent face to face (or over the phone) with the patient   Time spent ordering tests, medications, procedures and referrals   Time spent referring and communicating with other healthcare professionals   Time spent documenting clinical information in Epic

## 2025-04-03 NOTE — ANESTHESIA POSTPROCEDURE EVALUATION
"Resent medication to correct pharmacy     Answer Assessment - Initial Assessment Questions  1. NAME of MEDICINE: \"What medicine(s) are you calling about?\"      losartan (COZAAR) 100 MG tablet [[id number]]    2. QUESTION: \"What is your question?\" (e.g., double dose of medicine, side effect)      Wrong pharmacy  3. PRESCRIBER: \"Who prescribed the medicine?\" Reason: if prescribed by specialist, call should be referred to that group.   Authorizing Provider:  SHASTA Liriano    Protocols used: Medication Question Call-Adult-[place]    " Anesthesia POST Procedure Evaluation    Patient: Marichuy Navarrete   MRN:     [id number] Gender:   female   Age:    54 year old :      1965        Preoperative Diagnosis: Left Sacroiliac Joint Pain   Procedure(s):  STEALTH ASSISTED MINIMALLY INVASIVE SACRAL ILIAC JOINT FUSION LEFT   Postop Comments: No value filed.       Anesthesia Type:  General    Reportable Event: NO     PAIN: Uncomplicated   Sign Out status: Comfortable, Well controlled pain     PONV: No PONV   Sign Out status:  No Nausea or Vomiting     Neuro/Psych: Uneventful perioperative course   Sign Out Status: Preoperative baseline; Age appropriate mentation     Airway/Resp.: Uneventful perioperative course   Sign Out Status: Airway Device present     CV: Uneventful perioperative course   Sign Out status: Appropriate BP and perfusion indices; Appropriate HR/Rhythm     Disposition:   Sign Out in:  PACU  Disposition:  Phase II; Home  Recovery Course: Uneventful  Follow-Up: Not required           Last Anesthesia Record Vitals:  CRNA VITALS  3/18/2019 0755 - 3/18/2019 0825      3/18/2019             Pulse:  87    Ht Rate:  87    Temp 2:  35.4  C (95.7  F)    SpO2:  99 %    Resp Rate (observed):  10          Last PACU/Preop Vitals:  Vitals:    19 0945 19 1000 19 1015   BP: 132/88 139/87 (!) 146/93   Pulse: 85 72 74   Resp: 10 9 15   Temp: 36.4  C (97.5  F)     SpO2: 100% 100% 99%         Electronically Signed By: Max Pantoja MD, 2019, 10:45 AM

## (undated) DEVICE — LINEN TOWEL PACK X5 5464

## (undated) DEVICE — Device

## (undated) DEVICE — SYR 10ML FINGER CONTROL W/O NDL 309695

## (undated) DEVICE — BASIN SET MAJOR

## (undated) DEVICE — SUCTION MANIFOLD DORNOCH ULTRA CART UL-CL500

## (undated) DEVICE — POSITIONER ARMBOARD FOAM 1PAIR LF FP-ARMB1

## (undated) DEVICE — GLOVE PROTEXIS BLUE W/NEU-THERA 8.5  2D73EB85

## (undated) DEVICE — GLOVE PROTEXIS W/NEU-THERA 8.5  2D73TE85

## (undated) DEVICE — SU MONOCRYL 4-0 PS-2 18" UND Y496G

## (undated) DEVICE — GOWN IMPERVIOUS SPECIALTY XLG/XLONG 32474

## (undated) DEVICE — PREP DURAPREP REMOVER 4OZ 8611

## (undated) DEVICE — DRAPE STERI TOWEL LG 1010

## (undated) DEVICE — SPONGE SURGIFOAM 100 1974

## (undated) DEVICE — GLOVE PROTEXIS POWDER FREE 8.5 ORTHOPEDIC 2D73ET85

## (undated) DEVICE — RX SURGIFLO HEMOSTATIC MATRIX W/THROMBIN 8ML NEXTGEN 2993

## (undated) DEVICE — LINEN BACK PACK 5440

## (undated) DEVICE — PACK UNIVERSAL SPLIT 29131

## (undated) DEVICE — SU DERMABOND ADVANCED .7ML DNX12

## (undated) DEVICE — ESU ELEC BLADE 6" COATED E1450-6

## (undated) DEVICE — DRSG PRIMAPORE 02X3" 7133

## (undated) DEVICE — GOWN IMPERVIOUS ZONED XLG 9041

## (undated) DEVICE — DRAPE POUCH IRR 1016

## (undated) DEVICE — DRAPE IOBAN INCISE 36X23" 6651EZ

## (undated) DEVICE — PREP DURAPREP 26ML APL 8630

## (undated) DEVICE — ESU GROUND PAD UNIVERSAL W/O CORD

## (undated) DEVICE — SOL NACL 0.9% IRRIG 1000ML BOTTLE 2F7124

## (undated) DEVICE — SU VICRYL 0 CT-2 27" J334H

## (undated) DEVICE — MARKER SPHERZ PACK 5

## (undated) DEVICE — GLOVE PROTEXIS W/NEU-THERA 8.0  2D73TE80

## (undated) DEVICE — DRAPE O ARM TUBE 9732722

## (undated) DEVICE — DRSG PRIMAPORE 03 1/8X6" 66000318

## (undated) DEVICE — DRAPE POUCH INSTRUMENT 1018

## (undated) DEVICE — SOL WATER IRRIG 1000ML BOTTLE 2F7114

## (undated) DEVICE — SU VICRYL 2-0 CT-2 27" UND J269H

## (undated) RX ORDER — GLYCOPYRROLATE 0.2 MG/ML
INJECTION INTRAMUSCULAR; INTRAVENOUS
Status: DISPENSED
Start: 2019-03-18

## (undated) RX ORDER — ONDANSETRON 2 MG/ML
INJECTION INTRAMUSCULAR; INTRAVENOUS
Status: DISPENSED
Start: 2019-03-18

## (undated) RX ORDER — HYDROMORPHONE HYDROCHLORIDE 1 MG/ML
INJECTION, SOLUTION INTRAMUSCULAR; INTRAVENOUS; SUBCUTANEOUS
Status: DISPENSED
Start: 2019-03-18

## (undated) RX ORDER — FENTANYL CITRATE 50 UG/ML
INJECTION, SOLUTION INTRAMUSCULAR; INTRAVENOUS
Status: DISPENSED
Start: 2019-03-18

## (undated) RX ORDER — PROPOFOL 10 MG/ML
INJECTION, EMULSION INTRAVENOUS
Status: DISPENSED
Start: 2019-03-18

## (undated) RX ORDER — TRIAMCINOLONE ACETONIDE 40 MG/ML
INJECTION, SUSPENSION INTRA-ARTICULAR; INTRAMUSCULAR
Status: DISPENSED
Start: 2021-07-19

## (undated) RX ORDER — EPHEDRINE SULFATE 50 MG/ML
INJECTION, SOLUTION INTRAMUSCULAR; INTRAVENOUS; SUBCUTANEOUS
Status: DISPENSED
Start: 2019-03-18

## (undated) RX ORDER — OXYCODONE HYDROCHLORIDE 5 MG/1
TABLET ORAL
Status: DISPENSED
Start: 2019-03-18

## (undated) RX ORDER — DEXAMETHASONE SODIUM PHOSPHATE 4 MG/ML
INJECTION, SOLUTION INTRA-ARTICULAR; INTRALESIONAL; INTRAMUSCULAR; INTRAVENOUS; SOFT TISSUE
Status: DISPENSED
Start: 2019-03-18

## (undated) RX ORDER — CEFAZOLIN SODIUM 2 G/100ML
INJECTION, SOLUTION INTRAVENOUS
Status: DISPENSED
Start: 2019-03-18

## (undated) RX ORDER — LIDOCAINE HYDROCHLORIDE 10 MG/ML
INJECTION, SOLUTION EPIDURAL; INFILTRATION; INTRACAUDAL; PERINEURAL
Status: DISPENSED
Start: 2019-07-22

## (undated) RX ORDER — PHENYLEPHRINE HCL IN 0.9% NACL 1 MG/10 ML
SYRINGE (ML) INTRAVENOUS
Status: DISPENSED
Start: 2019-03-18

## (undated) RX ORDER — LIDOCAINE HYDROCHLORIDE 20 MG/ML
INJECTION, SOLUTION EPIDURAL; INFILTRATION; INTRACAUDAL; PERINEURAL
Status: DISPENSED
Start: 2019-03-18

## (undated) RX ORDER — GABAPENTIN 300 MG/1
CAPSULE ORAL
Status: DISPENSED
Start: 2019-03-18

## (undated) RX ORDER — LIDOCAINE HYDROCHLORIDE 10 MG/ML
INJECTION, SOLUTION EPIDURAL; INFILTRATION; INTRACAUDAL; PERINEURAL
Status: DISPENSED
Start: 2021-07-19

## (undated) RX ORDER — TRIAMCINOLONE ACETONIDE 40 MG/ML
INJECTION, SUSPENSION INTRA-ARTICULAR; INTRAMUSCULAR
Status: DISPENSED
Start: 2019-07-22

## (undated) RX ORDER — BUPIVACAINE HYDROCHLORIDE AND EPINEPHRINE 2.5; 5 MG/ML; UG/ML
INJECTION, SOLUTION INFILTRATION; PERINEURAL
Status: DISPENSED
Start: 2019-03-18

## (undated) RX ORDER — ACETAMINOPHEN 325 MG/1
TABLET ORAL
Status: DISPENSED
Start: 2019-03-18